# Patient Record
Sex: FEMALE | Race: WHITE | NOT HISPANIC OR LATINO | URBAN - METROPOLITAN AREA
[De-identification: names, ages, dates, MRNs, and addresses within clinical notes are randomized per-mention and may not be internally consistent; named-entity substitution may affect disease eponyms.]

---

## 2017-01-14 ENCOUNTER — EMERGENCY (EMERGENCY)
Facility: HOSPITAL | Age: 32
LOS: 1 days | Discharge: HOME | End: 2017-01-14
Attending: INTERNAL MEDICINE | Admitting: EMERGENCY MEDICINE
Payer: COMMERCIAL

## 2017-01-14 VITALS
WEIGHT: 190.04 LBS | OXYGEN SATURATION: 98 % | RESPIRATION RATE: 16 BRPM | DIASTOLIC BLOOD PRESSURE: 79 MMHG | HEIGHT: 63 IN | HEART RATE: 82 BPM | SYSTOLIC BLOOD PRESSURE: 118 MMHG | TEMPERATURE: 98 F

## 2017-01-14 DIAGNOSIS — S92.425A NONDISPLACED FRACTURE OF DISTAL PHALANX OF LEFT GREAT TOE, INITIAL ENCOUNTER FOR CLOSED FRACTURE: ICD-10-CM

## 2017-01-14 DIAGNOSIS — S99.922A UNSPECIFIED INJURY OF LEFT FOOT, INITIAL ENCOUNTER: ICD-10-CM

## 2017-01-14 DIAGNOSIS — Z90.13 ACQUIRED ABSENCE OF BILATERAL BREASTS AND NIPPLES: Chronic | ICD-10-CM
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
## 2017-01-14 DIAGNOSIS — F41.9 ANXIETY DISORDER, UNSPECIFIED: ICD-10-CM
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
## 2017-01-14 PROCEDURE — 99283 EMERGENCY DEPT VISIT LOW MDM: CPT | Mod: 25

## 2017-01-14 PROCEDURE — 73620 X-RAY EXAM OF FOOT: CPT

## 2017-01-14 PROCEDURE — 99284 EMERGENCY DEPT VISIT MOD MDM: CPT

## 2017-01-14 PROCEDURE — 73620 X-RAY EXAM OF FOOT: CPT | Mod: 26,LT

## 2017-01-14 NOTE — ED PROVIDER NOTE - CONSTITUTIONAL, MLM
normal... Well appearing, well nourished, awake, alert, oriented to person, place, time/situation and in mild  distress.

## 2017-01-14 NOTE — ED PROVIDER NOTE - PHYSICAL EXAMINATION
focused exam right foot, dorsum at 1st MT sts and pain focused exam left  foot, dorsum at 1st MT sts and pain

## 2017-01-14 NOTE — ED PROVIDER NOTE - CARE PLAN
Principal Discharge DX:	Foot injury Principal Discharge DX:	Foot injury  Secondary Diagnosis:	Toe fracture, left

## 2017-01-14 NOTE — ED PROVIDER NOTE - OBJECTIVE STATEMENT
32 y/o w/f trip and fall, she injured her right foot first MT area, c/o pain and swelling 32 y/o w/f trip and fall, she injured her left  foot first MT area, c/o pain and swelling

## 2017-01-18 NOTE — ED ADULT NURSE NOTE - ATTEMPT TO OOB
.1. Have you been to the ER, urgent care clinic since your last visit? Hospitalized since your last visit? No    2. Have you seen or consulted any other health care providers outside of the [facility]\"" since your last visit? Include any pap smears or colon screening.  No no

## 2018-06-12 ENCOUNTER — APPOINTMENT (OUTPATIENT)
Dept: GASTROENTEROLOGY | Facility: CLINIC | Age: 33
End: 2018-06-12
Payer: COMMERCIAL

## 2018-06-12 VITALS
OXYGEN SATURATION: 98 % | HEART RATE: 65 BPM | BODY MASS INDEX: 31.89 KG/M2 | TEMPERATURE: 97.7 F | HEIGHT: 63 IN | DIASTOLIC BLOOD PRESSURE: 84 MMHG | WEIGHT: 180 LBS | SYSTOLIC BLOOD PRESSURE: 118 MMHG

## 2018-06-12 DIAGNOSIS — Z80.3 FAMILY HISTORY OF MALIGNANT NEOPLASM OF BREAST: ICD-10-CM

## 2018-06-12 DIAGNOSIS — Z83.79 FAMILY HISTORY OF OTHER DISEASES OF THE DIGESTIVE SYSTEM: ICD-10-CM

## 2018-06-12 DIAGNOSIS — R10.9 UNSPECIFIED ABDOMINAL PAIN: ICD-10-CM

## 2018-06-12 DIAGNOSIS — Z80.52 FAMILY HISTORY OF MALIGNANT NEOPLASM OF BLADDER: ICD-10-CM

## 2018-06-12 DIAGNOSIS — Z86.59 PERSONAL HISTORY OF OTHER MENTAL AND BEHAVIORAL DISORDERS: ICD-10-CM

## 2018-06-12 PROCEDURE — 99205 OFFICE O/P NEW HI 60 MIN: CPT

## 2018-06-14 PROBLEM — Z80.3 FAMILY HISTORY OF MALIGNANT NEOPLASM OF BREAST: Status: ACTIVE | Noted: 2018-06-12

## 2018-06-14 PROBLEM — Z86.59 HISTORY OF ANXIETY: Status: RESOLVED | Noted: 2018-06-12 | Resolved: 2018-06-14

## 2018-06-14 PROBLEM — Z83.79 FAMILY HISTORY OF CROHN'S DISEASE: Status: ACTIVE | Noted: 2018-06-12

## 2018-06-14 PROBLEM — Z80.52 FAMILY HISTORY OF MALIGNANT NEOPLASM OF URINARY BLADDER: Status: ACTIVE | Noted: 2018-06-12

## 2018-06-14 RX ORDER — CLONAZEPAM 0.5 MG/1
0.5 TABLET ORAL
Refills: 0 | Status: ACTIVE | COMMUNITY

## 2018-06-14 RX ORDER — ATROPA BELLADONNA AND OPIUM 16.2; 3 MG/1; MG/1
16.2-3 SUPPOSITORY RECTAL
Refills: 0 | Status: ACTIVE | COMMUNITY

## 2018-06-14 RX ORDER — BACILLUS COAGULANS/INULIN 1B-250 MG
CAPSULE ORAL
Refills: 0 | Status: ACTIVE | COMMUNITY

## 2018-06-14 RX ORDER — ONDANSETRON HYDROCHLORIDE 4 MG/1
4 TABLET, FILM COATED ORAL
Refills: 0 | Status: ACTIVE | COMMUNITY

## 2018-06-14 RX ORDER — ESCITALOPRAM OXALATE 20 MG/1
20 TABLET, FILM COATED ORAL
Refills: 0 | Status: ACTIVE | COMMUNITY

## 2018-06-15 ENCOUNTER — LABORATORY RESULT (OUTPATIENT)
Age: 33
End: 2018-06-15

## 2018-06-18 LAB
25(OH)D3 SERPL-MCNC: 22.9 NG/ML
ALBUMIN SERPL ELPH-MCNC: 4 G/DL
ALP BLD-CCNC: 49 U/L
ALT SERPL-CCNC: 21 U/L
AMYLASE/CREAT SERPL: 58 U/L
ANION GAP SERPL CALC-SCNC: 13 MMOL/L
AST SERPL-CCNC: 16 U/L
BACTERIA STL CULT: NORMAL
BASOPHILS # BLD AUTO: 0.05 K/UL
BASOPHILS NFR BLD AUTO: 0.8 %
BILIRUB SERPL-MCNC: 0.2 MG/DL
BUN SERPL-MCNC: 11 MG/DL
CALCIUM SERPL-MCNC: 9.1 MG/DL
CHLORIDE SERPL-SCNC: 101 MMOL/L
CO2 SERPL-SCNC: 26 MMOL/L
CREAT SERPL-MCNC: 0.64 MG/DL
CRP SERPL-MCNC: <0.2 MG/DL
EOSINOPHIL # BLD AUTO: 0.08 K/UL
EOSINOPHIL NFR BLD AUTO: 1.3 %
G LAMBLIA AG STL QL: NORMAL
GLIADIN IGA SER QL: <5 UNITS
GLIADIN IGG SER QL: <5 UNITS
GLIADIN PEPTIDE IGA SER-ACNC: NEGATIVE
GLIADIN PEPTIDE IGG SER-ACNC: NEGATIVE
GLUCOSE SERPL-MCNC: 85 MG/DL
HCT VFR BLD CALC: 34.9 %
HGB BLD-MCNC: 11.5 G/DL
IGA SER QL IEP: 198 MG/DL
IMM GRANULOCYTES NFR BLD AUTO: 0.2 %
LPL SERPL-CCNC: 69 U/L
LYMPHOCYTES # BLD AUTO: 1.72 K/UL
LYMPHOCYTES NFR BLD AUTO: 28.4 %
MAN DIFF?: NORMAL
MCHC RBC-ENTMCNC: 29.5 PG
MCHC RBC-ENTMCNC: 33 GM/DL
MCV RBC AUTO: 89.5 FL
MONOCYTES # BLD AUTO: 0.37 K/UL
MONOCYTES NFR BLD AUTO: 6.1 %
NEUTROPHILS # BLD AUTO: 3.82 K/UL
NEUTROPHILS NFR BLD AUTO: 63.2 %
PLATELET # BLD AUTO: 333 K/UL
POTASSIUM SERPL-SCNC: 4.1 MMOL/L
PROT SERPL-MCNC: 6.7 G/DL
RBC # BLD: 3.9 M/UL
RBC # FLD: 13.1 %
SODIUM SERPL-SCNC: 140 MMOL/L
TSH SERPL-ACNC: 0.88 UIU/ML
TTG IGA SER IA-ACNC: <5 UNITS
TTG IGA SER-ACNC: NEGATIVE
TTG IGG SER IA-ACNC: <5 UNITS
TTG IGG SER IA-ACNC: NEGATIVE
WBC # FLD AUTO: 6.05 K/UL
WRIGHT STN STL: NEGATIVE

## 2018-06-19 LAB — DEPRECATED O AND P PREP STL: NORMAL

## 2018-06-20 LAB
ENDOMYSIUM IGA SER QL: NEGATIVE
ENDOMYSIUM IGA TITR SER: NORMAL

## 2018-06-21 LAB — C DIFF TOX B STL QL CT TISS CULT: NORMAL

## 2018-06-22 ENCOUNTER — FORM ENCOUNTER (OUTPATIENT)
Age: 33
End: 2018-06-22

## 2018-06-23 ENCOUNTER — APPOINTMENT (OUTPATIENT)
Dept: CT IMAGING | Facility: CLINIC | Age: 33
End: 2018-06-23
Payer: COMMERCIAL

## 2018-06-23 ENCOUNTER — OUTPATIENT (OUTPATIENT)
Dept: OUTPATIENT SERVICES | Facility: HOSPITAL | Age: 33
LOS: 1 days | End: 2018-06-23
Payer: COMMERCIAL

## 2018-06-23 DIAGNOSIS — R10.9 UNSPECIFIED ABDOMINAL PAIN: ICD-10-CM

## 2018-06-23 DIAGNOSIS — Z90.13 ACQUIRED ABSENCE OF BILATERAL BREASTS AND NIPPLES: Chronic | ICD-10-CM

## 2018-06-23 PROCEDURE — 74177 CT ABD & PELVIS W/CONTRAST: CPT | Mod: 26

## 2018-06-23 PROCEDURE — 74177 CT ABD & PELVIS W/CONTRAST: CPT

## 2018-06-25 ENCOUNTER — TRANSCRIPTION ENCOUNTER (OUTPATIENT)
Age: 33
End: 2018-06-25

## 2018-06-26 ENCOUNTER — APPOINTMENT (OUTPATIENT)
Dept: GASTROENTEROLOGY | Facility: HOSPITAL | Age: 33
End: 2018-06-26

## 2018-06-26 ENCOUNTER — OUTPATIENT (OUTPATIENT)
Dept: OUTPATIENT SERVICES | Facility: HOSPITAL | Age: 33
LOS: 1 days | End: 2018-06-26
Payer: COMMERCIAL

## 2018-06-26 ENCOUNTER — RESULT REVIEW (OUTPATIENT)
Age: 33
End: 2018-06-26

## 2018-06-26 DIAGNOSIS — R10.9 UNSPECIFIED ABDOMINAL PAIN: ICD-10-CM

## 2018-06-26 DIAGNOSIS — K21.9 GASTRO-ESOPHAGEAL REFLUX DISEASE WITHOUT ESOPHAGITIS: ICD-10-CM

## 2018-06-26 DIAGNOSIS — Z90.13 ACQUIRED ABSENCE OF BILATERAL BREASTS AND NIPPLES: Chronic | ICD-10-CM

## 2018-06-26 DIAGNOSIS — R63.4 ABNORMAL WEIGHT LOSS: ICD-10-CM

## 2018-06-26 LAB — HCG UR QL: NEGATIVE — SIGNIFICANT CHANGE UP

## 2018-06-26 PROCEDURE — 43239 EGD BIOPSY SINGLE/MULTIPLE: CPT

## 2018-06-26 PROCEDURE — 81025 URINE PREGNANCY TEST: CPT

## 2018-06-26 RX ORDER — OMEPRAZOLE 40 MG/1
40 CAPSULE, DELAYED RELEASE ORAL
Qty: 30 | Refills: 12 | Status: ACTIVE | COMMUNITY
Start: 2018-06-26 | End: 1900-01-01

## 2018-06-29 ENCOUNTER — OUTPATIENT (OUTPATIENT)
Dept: OUTPATIENT SERVICES | Facility: HOSPITAL | Age: 33
LOS: 1 days | End: 2018-06-29
Payer: COMMERCIAL

## 2018-06-29 ENCOUNTER — APPOINTMENT (OUTPATIENT)
Dept: GASTROENTEROLOGY | Facility: CLINIC | Age: 33
End: 2018-06-29

## 2018-06-29 DIAGNOSIS — T18.2XXA FOREIGN BODY IN STOMACH, INITIAL ENCOUNTER: ICD-10-CM

## 2018-06-29 DIAGNOSIS — Z90.13 ACQUIRED ABSENCE OF BILATERAL BREASTS AND NIPPLES: Chronic | ICD-10-CM

## 2018-06-29 PROCEDURE — 78264 GASTRIC EMPTYING IMG STUDY: CPT

## 2018-06-29 PROCEDURE — 78264 GASTRIC EMPTYING IMG STUDY: CPT | Mod: 26,GC

## 2018-06-29 PROCEDURE — A9541: CPT

## 2018-07-03 ENCOUNTER — EMERGENCY (EMERGENCY)
Facility: HOSPITAL | Age: 33
LOS: 1 days | Discharge: HOME | End: 2018-07-03
Attending: EMERGENCY MEDICINE
Payer: COMMERCIAL

## 2018-07-03 VITALS
WEIGHT: 181 LBS | RESPIRATION RATE: 18 BRPM | DIASTOLIC BLOOD PRESSURE: 73 MMHG | HEIGHT: 63 IN | SYSTOLIC BLOOD PRESSURE: 117 MMHG | TEMPERATURE: 98 F | OXYGEN SATURATION: 99 % | HEART RATE: 95 BPM

## 2018-07-03 VITALS
RESPIRATION RATE: 16 BRPM | HEART RATE: 87 BPM | TEMPERATURE: 98 F | DIASTOLIC BLOOD PRESSURE: 70 MMHG | SYSTOLIC BLOOD PRESSURE: 110 MMHG | OXYGEN SATURATION: 99 %

## 2018-07-03 DIAGNOSIS — Z90.13 ACQUIRED ABSENCE OF BILATERAL BREASTS AND NIPPLES: Chronic | ICD-10-CM

## 2018-07-03 LAB
ALBUMIN SERPL ELPH-MCNC: 3.7 G/DL — SIGNIFICANT CHANGE UP (ref 3.3–5)
ALP SERPL-CCNC: 75 U/L — SIGNIFICANT CHANGE UP (ref 40–120)
ALT FLD-CCNC: 131 U/L — HIGH (ref 12–78)
ANION GAP SERPL CALC-SCNC: 9 MMOL/L — SIGNIFICANT CHANGE UP (ref 5–17)
APPEARANCE UR: ABNORMAL
AST SERPL-CCNC: 247 U/L — HIGH (ref 15–37)
BACTERIA # UR AUTO: ABNORMAL
BASOPHILS # BLD AUTO: 0.06 K/UL — SIGNIFICANT CHANGE UP (ref 0–0.2)
BASOPHILS NFR BLD AUTO: 0.6 % — SIGNIFICANT CHANGE UP (ref 0–2)
BILIRUB SERPL-MCNC: 0.6 MG/DL — SIGNIFICANT CHANGE UP (ref 0.2–1.2)
BILIRUB UR-MCNC: NEGATIVE — SIGNIFICANT CHANGE UP
BUN SERPL-MCNC: 9 MG/DL — SIGNIFICANT CHANGE UP (ref 7–23)
CALCIUM SERPL-MCNC: 8.2 MG/DL — LOW (ref 8.5–10.1)
CHLORIDE SERPL-SCNC: 107 MMOL/L — SIGNIFICANT CHANGE UP (ref 96–108)
CO2 SERPL-SCNC: 25 MMOL/L — SIGNIFICANT CHANGE UP (ref 22–31)
COLOR SPEC: YELLOW — SIGNIFICANT CHANGE UP
CREAT SERPL-MCNC: 0.69 MG/DL — SIGNIFICANT CHANGE UP (ref 0.5–1.3)
DIFF PNL FLD: ABNORMAL
EOSINOPHIL # BLD AUTO: 0.01 K/UL — SIGNIFICANT CHANGE UP (ref 0–0.5)
EOSINOPHIL NFR BLD AUTO: 0.1 % — SIGNIFICANT CHANGE UP (ref 0–6)
EPI CELLS # UR: SIGNIFICANT CHANGE UP
GLUCOSE SERPL-MCNC: 95 MG/DL — SIGNIFICANT CHANGE UP (ref 70–99)
GLUCOSE UR QL: NEGATIVE — SIGNIFICANT CHANGE UP
HCT VFR BLD CALC: 34.3 % — LOW (ref 34.5–45)
HGB BLD-MCNC: 11.5 G/DL — SIGNIFICANT CHANGE UP (ref 11.5–15.5)
IMM GRANULOCYTES NFR BLD AUTO: 0.2 % — SIGNIFICANT CHANGE UP (ref 0–1.5)
KETONES UR-MCNC: NEGATIVE — SIGNIFICANT CHANGE UP
LEUKOCYTE ESTERASE UR-ACNC: ABNORMAL
LIDOCAIN IGE QN: 161 U/L — SIGNIFICANT CHANGE UP (ref 73–393)
LYMPHOCYTES # BLD AUTO: 1.41 K/UL — SIGNIFICANT CHANGE UP (ref 1–3.3)
LYMPHOCYTES # BLD AUTO: 14.8 % — SIGNIFICANT CHANGE UP (ref 13–44)
MCHC RBC-ENTMCNC: 29.4 PG — SIGNIFICANT CHANGE UP (ref 27–34)
MCHC RBC-ENTMCNC: 33.5 GM/DL — SIGNIFICANT CHANGE UP (ref 32–36)
MCV RBC AUTO: 87.7 FL — SIGNIFICANT CHANGE UP (ref 80–100)
MONOCYTES # BLD AUTO: 0.54 K/UL — SIGNIFICANT CHANGE UP (ref 0–0.9)
MONOCYTES NFR BLD AUTO: 5.7 % — SIGNIFICANT CHANGE UP (ref 2–14)
NEUTROPHILS # BLD AUTO: 7.46 K/UL — HIGH (ref 1.8–7.4)
NEUTROPHILS NFR BLD AUTO: 78.6 % — HIGH (ref 43–77)
NITRITE UR-MCNC: NEGATIVE — SIGNIFICANT CHANGE UP
NRBC # BLD: 0 /100 WBCS — SIGNIFICANT CHANGE UP (ref 0–0)
PH UR: 8 — SIGNIFICANT CHANGE UP (ref 5–8)
PLATELET # BLD AUTO: 284 K/UL — SIGNIFICANT CHANGE UP (ref 150–400)
POTASSIUM SERPL-MCNC: 3.7 MMOL/L — SIGNIFICANT CHANGE UP (ref 3.5–5.3)
POTASSIUM SERPL-SCNC: 3.7 MMOL/L — SIGNIFICANT CHANGE UP (ref 3.5–5.3)
PROT SERPL-MCNC: 6.9 G/DL — SIGNIFICANT CHANGE UP (ref 6–8.3)
PROT UR-MCNC: 25 MG/DL
RBC # BLD: 3.91 M/UL — SIGNIFICANT CHANGE UP (ref 3.8–5.2)
RBC # FLD: 12.9 % — SIGNIFICANT CHANGE UP (ref 10.3–14.5)
RBC CASTS # UR COMP ASSIST: ABNORMAL /HPF (ref 0–4)
SODIUM SERPL-SCNC: 141 MMOL/L — SIGNIFICANT CHANGE UP (ref 135–145)
SP GR SPEC: 1.01 — SIGNIFICANT CHANGE UP (ref 1.01–1.02)
UROBILINOGEN FLD QL: NEGATIVE — SIGNIFICANT CHANGE UP
WBC # BLD: 9.5 K/UL — SIGNIFICANT CHANGE UP (ref 3.8–10.5)
WBC # FLD AUTO: 9.5 K/UL — SIGNIFICANT CHANGE UP (ref 3.8–10.5)
WBC UR QL: SIGNIFICANT CHANGE UP

## 2018-07-03 PROCEDURE — 99284 EMERGENCY DEPT VISIT MOD MDM: CPT | Mod: 25

## 2018-07-03 PROCEDURE — 81001 URINALYSIS AUTO W/SCOPE: CPT

## 2018-07-03 PROCEDURE — 83690 ASSAY OF LIPASE: CPT

## 2018-07-03 PROCEDURE — 85027 COMPLETE CBC AUTOMATED: CPT

## 2018-07-03 PROCEDURE — 76705 ECHO EXAM OF ABDOMEN: CPT

## 2018-07-03 PROCEDURE — 76705 ECHO EXAM OF ABDOMEN: CPT | Mod: 26

## 2018-07-03 PROCEDURE — 99285 EMERGENCY DEPT VISIT HI MDM: CPT

## 2018-07-03 PROCEDURE — 80053 COMPREHEN METABOLIC PANEL: CPT

## 2018-07-03 PROCEDURE — 96375 TX/PRO/DX INJ NEW DRUG ADDON: CPT

## 2018-07-03 PROCEDURE — 96374 THER/PROPH/DIAG INJ IV PUSH: CPT

## 2018-07-03 RX ORDER — ONDANSETRON 8 MG/1
4 TABLET, FILM COATED ORAL ONCE
Qty: 0 | Refills: 0 | Status: COMPLETED | OUTPATIENT
Start: 2018-07-03 | End: 2018-07-03

## 2018-07-03 RX ORDER — FAMOTIDINE 10 MG/ML
20 INJECTION INTRAVENOUS ONCE
Qty: 0 | Refills: 0 | Status: COMPLETED | OUTPATIENT
Start: 2018-07-03 | End: 2018-07-03

## 2018-07-03 RX ORDER — SUCRALFATE 1 G
10 TABLET ORAL
Qty: 100 | Refills: 0 | OUTPATIENT
Start: 2018-07-03 | End: 2018-07-12

## 2018-07-03 RX ORDER — SODIUM CHLORIDE 9 MG/ML
3 INJECTION INTRAMUSCULAR; INTRAVENOUS; SUBCUTANEOUS ONCE
Qty: 0 | Refills: 0 | Status: COMPLETED | OUTPATIENT
Start: 2018-07-03 | End: 2018-07-03

## 2018-07-03 RX ORDER — SODIUM CHLORIDE 9 MG/ML
1000 INJECTION INTRAMUSCULAR; INTRAVENOUS; SUBCUTANEOUS ONCE
Qty: 0 | Refills: 0 | Status: COMPLETED | OUTPATIENT
Start: 2018-07-03 | End: 2018-07-03

## 2018-07-03 RX ORDER — METOCLOPRAMIDE HCL 10 MG
10 TABLET ORAL ONCE
Qty: 0 | Refills: 0 | Status: COMPLETED | OUTPATIENT
Start: 2018-07-03 | End: 2018-07-03

## 2018-07-03 RX ORDER — FAMOTIDINE 10 MG/ML
20 INJECTION INTRAVENOUS ONCE
Qty: 0 | Refills: 0 | Status: DISCONTINUED | OUTPATIENT
Start: 2018-07-03 | End: 2018-07-03

## 2018-07-03 RX ORDER — SUCRALFATE 1 G
1 TABLET ORAL ONCE
Qty: 0 | Refills: 0 | Status: COMPLETED | OUTPATIENT
Start: 2018-07-03 | End: 2018-07-03

## 2018-07-03 RX ADMIN — FAMOTIDINE 104 MILLIGRAM(S): 10 INJECTION INTRAVENOUS at 02:32

## 2018-07-03 RX ADMIN — Medication 1 GRAM(S): at 04:30

## 2018-07-03 RX ADMIN — SODIUM CHLORIDE 1000 MILLILITER(S): 9 INJECTION INTRAMUSCULAR; INTRAVENOUS; SUBCUTANEOUS at 02:32

## 2018-07-03 RX ADMIN — Medication 10 MILLIGRAM(S): at 04:29

## 2018-07-03 RX ADMIN — SODIUM CHLORIDE 3 MILLILITER(S): 9 INJECTION INTRAMUSCULAR; INTRAVENOUS; SUBCUTANEOUS at 02:00

## 2018-07-03 RX ADMIN — ONDANSETRON 4 MILLIGRAM(S): 8 TABLET, FILM COATED ORAL at 02:32

## 2018-07-03 NOTE — ED PROVIDER NOTE - PROGRESS NOTE DETAILS
pt reevaluated, feeling much better, cramping has resolved and nausea has subsided, pt to be d/c home with carafate, follow up with her GI doc, and return if any symptoms worsen

## 2018-07-03 NOTE — ED ADULT NURSE NOTE - OBJECTIVE STATEMENT
received pt stable c/o abd pain pt evaluated and blood work drawned and sent to lab and medicated as ordered

## 2018-07-03 NOTE — ED PROVIDER NOTE - OBJECTIVE STATEMENT
33yo female who presents with abd pain vomiting and diarrhea today. pt has hx of ibs and started with diarrhea today, then vomiting, with diffuse cramping, pt tried her bentyl, pepcid and zofran with no relief. pt was just seen by her GI doc, had ct, endoscopy, colonoscopy

## 2018-07-03 NOTE — ED ADULT TRIAGE NOTE - CHIEF COMPLAINT QUOTE
complains of upper abdominal pain, nausea/vomiting/diarrhea. complains of chills without fever. history of IBS

## 2018-07-11 ENCOUNTER — EMERGENCY (EMERGENCY)
Facility: HOSPITAL | Age: 33
LOS: 1 days | Discharge: HOME | End: 2018-07-11
Attending: EMERGENCY MEDICINE | Admitting: EMERGENCY MEDICINE
Payer: COMMERCIAL

## 2018-07-11 VITALS
HEART RATE: 71 BPM | TEMPERATURE: 98 F | DIASTOLIC BLOOD PRESSURE: 84 MMHG | SYSTOLIC BLOOD PRESSURE: 130 MMHG | OXYGEN SATURATION: 97 % | RESPIRATION RATE: 16 BRPM

## 2018-07-11 VITALS
WEIGHT: 179.9 LBS | RESPIRATION RATE: 15 BRPM | HEART RATE: 73 BPM | OXYGEN SATURATION: 99 % | TEMPERATURE: 98 F | DIASTOLIC BLOOD PRESSURE: 69 MMHG | SYSTOLIC BLOOD PRESSURE: 102 MMHG | HEIGHT: 63 IN

## 2018-07-11 DIAGNOSIS — Z90.49 ACQUIRED ABSENCE OF OTHER SPECIFIED PARTS OF DIGESTIVE TRACT: Chronic | ICD-10-CM

## 2018-07-11 DIAGNOSIS — Z90.13 ACQUIRED ABSENCE OF BILATERAL BREASTS AND NIPPLES: Chronic | ICD-10-CM

## 2018-07-11 LAB
APPEARANCE UR: CLEAR — SIGNIFICANT CHANGE UP
BASOPHILS # BLD AUTO: 0.06 K/UL — SIGNIFICANT CHANGE UP (ref 0–0.2)
BASOPHILS NFR BLD AUTO: 1.2 % — SIGNIFICANT CHANGE UP (ref 0–2)
BILIRUB UR-MCNC: NEGATIVE — SIGNIFICANT CHANGE UP
COLOR SPEC: SIGNIFICANT CHANGE UP
DIFF PNL FLD: NEGATIVE — SIGNIFICANT CHANGE UP
EOSINOPHIL # BLD AUTO: 0.02 K/UL — SIGNIFICANT CHANGE UP (ref 0–0.5)
EOSINOPHIL NFR BLD AUTO: 0.4 % — SIGNIFICANT CHANGE UP (ref 0–6)
GLUCOSE UR QL: NEGATIVE — SIGNIFICANT CHANGE UP
HCT VFR BLD CALC: 36.5 % — SIGNIFICANT CHANGE UP (ref 34.5–45)
HGB BLD-MCNC: 12 G/DL — SIGNIFICANT CHANGE UP (ref 11.5–15.5)
IMM GRANULOCYTES NFR BLD AUTO: 0.4 % — SIGNIFICANT CHANGE UP (ref 0–1.5)
KETONES UR-MCNC: NEGATIVE — SIGNIFICANT CHANGE UP
LEUKOCYTE ESTERASE UR-ACNC: NEGATIVE — SIGNIFICANT CHANGE UP
LIDOCAIN IGE QN: 174 U/L — SIGNIFICANT CHANGE UP (ref 73–393)
LYMPHOCYTES # BLD AUTO: 0.92 K/UL — LOW (ref 1–3.3)
LYMPHOCYTES # BLD AUTO: 18.9 % — SIGNIFICANT CHANGE UP (ref 13–44)
MCHC RBC-ENTMCNC: 29.1 PG — SIGNIFICANT CHANGE UP (ref 27–34)
MCHC RBC-ENTMCNC: 32.9 GM/DL — SIGNIFICANT CHANGE UP (ref 32–36)
MCV RBC AUTO: 88.4 FL — SIGNIFICANT CHANGE UP (ref 80–100)
MONOCYTES # BLD AUTO: 0.46 K/UL — SIGNIFICANT CHANGE UP (ref 0–0.9)
MONOCYTES NFR BLD AUTO: 9.4 % — SIGNIFICANT CHANGE UP (ref 2–14)
NEUTROPHILS # BLD AUTO: 3.39 K/UL — SIGNIFICANT CHANGE UP (ref 1.8–7.4)
NEUTROPHILS NFR BLD AUTO: 69.7 % — SIGNIFICANT CHANGE UP (ref 43–77)
NITRITE UR-MCNC: NEGATIVE — SIGNIFICANT CHANGE UP
NRBC # BLD: 0 /100 WBCS — SIGNIFICANT CHANGE UP (ref 0–0)
PH UR: 8 — SIGNIFICANT CHANGE UP (ref 5–8)
PLATELET # BLD AUTO: 259 K/UL — SIGNIFICANT CHANGE UP (ref 150–400)
PROT UR-MCNC: NEGATIVE — SIGNIFICANT CHANGE UP
RBC # BLD: 4.13 M/UL — SIGNIFICANT CHANGE UP (ref 3.8–5.2)
RBC # FLD: 12.7 % — SIGNIFICANT CHANGE UP (ref 10.3–14.5)
SP GR SPEC: 1.01 — SIGNIFICANT CHANGE UP (ref 1.01–1.02)
UROBILINOGEN FLD QL: NEGATIVE — SIGNIFICANT CHANGE UP
WBC # BLD: 4.87 K/UL — SIGNIFICANT CHANGE UP (ref 3.8–10.5)
WBC # FLD AUTO: 4.87 K/UL — SIGNIFICANT CHANGE UP (ref 3.8–10.5)

## 2018-07-11 PROCEDURE — 80053 COMPREHEN METABOLIC PANEL: CPT

## 2018-07-11 PROCEDURE — 85027 COMPLETE CBC AUTOMATED: CPT

## 2018-07-11 PROCEDURE — 96374 THER/PROPH/DIAG INJ IV PUSH: CPT

## 2018-07-11 PROCEDURE — 99284 EMERGENCY DEPT VISIT MOD MDM: CPT | Mod: 25

## 2018-07-11 PROCEDURE — 83690 ASSAY OF LIPASE: CPT

## 2018-07-11 PROCEDURE — 99284 EMERGENCY DEPT VISIT MOD MDM: CPT

## 2018-07-11 PROCEDURE — 81003 URINALYSIS AUTO W/O SCOPE: CPT

## 2018-07-11 PROCEDURE — 36415 COLL VENOUS BLD VENIPUNCTURE: CPT

## 2018-07-11 PROCEDURE — 96375 TX/PRO/DX INJ NEW DRUG ADDON: CPT

## 2018-07-11 RX ORDER — ONDANSETRON 8 MG/1
4 TABLET, FILM COATED ORAL ONCE
Qty: 0 | Refills: 0 | Status: COMPLETED | OUTPATIENT
Start: 2018-07-11 | End: 2018-07-11

## 2018-07-11 RX ORDER — METOCLOPRAMIDE HCL 10 MG
10 TABLET ORAL ONCE
Qty: 0 | Refills: 0 | Status: COMPLETED | OUTPATIENT
Start: 2018-07-11 | End: 2018-07-11

## 2018-07-11 RX ORDER — PANTOPRAZOLE SODIUM 20 MG/1
40 TABLET, DELAYED RELEASE ORAL ONCE
Qty: 0 | Refills: 0 | Status: COMPLETED | OUTPATIENT
Start: 2018-07-11 | End: 2018-07-11

## 2018-07-11 RX ORDER — SODIUM CHLORIDE 9 MG/ML
1000 INJECTION INTRAMUSCULAR; INTRAVENOUS; SUBCUTANEOUS ONCE
Qty: 0 | Refills: 0 | Status: COMPLETED | OUTPATIENT
Start: 2018-07-11 | End: 2018-07-11

## 2018-07-11 RX ADMIN — Medication 10 MILLIGRAM(S): at 23:15

## 2018-07-11 RX ADMIN — SODIUM CHLORIDE 1000 MILLILITER(S): 9 INJECTION INTRAMUSCULAR; INTRAVENOUS; SUBCUTANEOUS at 21:55

## 2018-07-11 RX ADMIN — ONDANSETRON 4 MILLIGRAM(S): 8 TABLET, FILM COATED ORAL at 21:56

## 2018-07-11 RX ADMIN — PANTOPRAZOLE SODIUM 40 MILLIGRAM(S): 20 TABLET, DELAYED RELEASE ORAL at 21:57

## 2018-07-11 NOTE — ED ADULT NURSE NOTE - OBJECTIVE STATEMENT
Pt states she has had frequent stomach issues including N/V/D for close to a year. Pt had a cholecystectomy a year ago and has been followed by GI for management. Pt has been seen in the ED 1 week ago and was prescribed GI meds, anti-spasmodic meds and anti-nausea medication. Pt now has the same symptoms tonight.

## 2018-07-11 NOTE — ED PROVIDER NOTE - OBJECTIVE STATEMENT
33 y/o F pt with history of cholecystectomy, anxiety- on Lexapro and Klonopin, nephrolithiasis, and GERD presents to the ED c/o nausea and abdominal pain x1 year, worsening. Pt was having constant diarrhea yesterday and c/o experiencing RUQ spasms. Pt was at the ED in [place] 8 days ago for similar sx. Pt reports having diarrhea since cholecystectomy. Pt has been tested for Crohn's and has recently had a CT, endoscopy and blood-work. Pt was given Carafate. Pt has GI doctor, but recently changed to a new one. Pt took Zofran a couple hours ago. Pt confirms elevated past LFTs. Pt also takes Pepcid and dicyclomine. Denies urinary dysfunction, fever, and vomiting. No further complaints at this time.   PMD: Dr. Eaton 33 y/o F pt with history of cholecystectomy, anxiety- on Lexapro and Klonopin, nephrolithiasis, and GERD presents to the ED c/o nausea and abdominal pain x1 year, worsening. Pt was having constant diarrhea yesterday and c/o experiencing RUQ spasms. Pt was at the ED in [place] 8 days ago for similar sx. Pt reports having diarrhea since cholecystectomy. Pt has been tested for Crohn's and has recently had a CT, endoscopy and blood-work. Pt was given Carafate. Pt has GI doctor, but recently changed to a new one. Pt took Zofran a couple hours ago. Pt confirms elevated past LFTs. Pt also takes Pepcid and dicyclomine. Denies urinary dysfunction, fever, and vomiting. No further complaints at this time.   GI: Dr. Abdalla

## 2018-07-11 NOTE — ED PROVIDER NOTE - PSH
History of bilateral breast reduction surgery    S/P cholecystectomy    S/P rhinoplasty    S/P sinus surgery

## 2018-07-11 NOTE — ED PROVIDER NOTE - NS_ ATTENDINGSCRIBEDETAILS _ED_A_ED_FT
Ricky Araujo MD - The scribe's documentation has been prepared under my direction and personally reviewed by me in its entirety. I confirm that the note above accurately reflects all work, treatment, procedures, and medical decision making performed by me.

## 2018-07-11 NOTE — ED ADULT NURSE NOTE - CHIEF COMPLAINT QUOTE
Abdominal pain for years, was in [place] ER for same last week. Appointment gastro this Friday. Nausea since 1 pm, no vomiting.

## 2018-07-11 NOTE — ED PROVIDER NOTE - ENMT, MLM
Airway patent, +DRY MUCOUS MEMBRANES. Throat has no vesicles, no oropharyngeal exudates and uvula is midline.

## 2018-07-11 NOTE — ED ADULT TRIAGE NOTE - CHIEF COMPLAINT QUOTE
Abdominal pain for years, was in [place] ER for same last week. Appointment gastro this Friday. Nausea since 1 pm, no vomiting.

## 2018-07-11 NOTE — ED ADULT NURSE REASSESSMENT NOTE - NS ED NURSE REASSESS COMMENT FT1
Pt reports feeling improvement following medication administration. Pt is c/o of mild nausea at this time, no further abdominal pain.

## 2018-07-11 NOTE — ED PROVIDER NOTE - MEDICAL DECISION MAKING DETAILS
33 y/o F pt with abdominal pain and diarrhea. Will rehydrate, receive anti-nausea medication, and start PPI. Pt can follow up with GI as an outpatient.

## 2018-07-13 ENCOUNTER — FORM ENCOUNTER (OUTPATIENT)
Age: 33
End: 2018-07-13

## 2018-07-13 ENCOUNTER — APPOINTMENT (OUTPATIENT)
Dept: GASTROENTEROLOGY | Facility: CLINIC | Age: 33
End: 2018-07-13
Payer: COMMERCIAL

## 2018-07-13 VITALS
SYSTOLIC BLOOD PRESSURE: 108 MMHG | HEIGHT: 63 IN | DIASTOLIC BLOOD PRESSURE: 76 MMHG | BODY MASS INDEX: 31.54 KG/M2 | WEIGHT: 178 LBS | HEART RATE: 68 BPM | OXYGEN SATURATION: 97 %

## 2018-07-13 DIAGNOSIS — K58.9 IRRITABLE BOWEL SYNDROME W/OUT DIARRHEA: ICD-10-CM

## 2018-07-13 DIAGNOSIS — R63.4 ABNORMAL WEIGHT LOSS: ICD-10-CM

## 2018-07-13 DIAGNOSIS — R74.8 ABNORMAL LEVELS OF OTHER SERUM ENZYMES: ICD-10-CM

## 2018-07-13 DIAGNOSIS — T18.2XXA FOREIGN BODY IN STOMACH, INITIAL ENCOUNTER: ICD-10-CM

## 2018-07-13 PROCEDURE — 99215 OFFICE O/P EST HI 40 MIN: CPT

## 2018-07-14 ENCOUNTER — OUTPATIENT (OUTPATIENT)
Dept: OUTPATIENT SERVICES | Facility: HOSPITAL | Age: 33
LOS: 1 days | End: 2018-07-14
Payer: COMMERCIAL

## 2018-07-14 ENCOUNTER — APPOINTMENT (OUTPATIENT)
Dept: ULTRASOUND IMAGING | Facility: CLINIC | Age: 33
End: 2018-07-14

## 2018-07-14 DIAGNOSIS — Z90.13 ACQUIRED ABSENCE OF BILATERAL BREASTS AND NIPPLES: Chronic | ICD-10-CM

## 2018-07-14 DIAGNOSIS — R74.8 ABNORMAL LEVELS OF OTHER SERUM ENZYMES: ICD-10-CM

## 2018-07-14 DIAGNOSIS — Z90.49 ACQUIRED ABSENCE OF OTHER SPECIFIED PARTS OF DIGESTIVE TRACT: Chronic | ICD-10-CM

## 2018-07-14 PROCEDURE — 76700 US EXAM ABDOM COMPLETE: CPT

## 2018-07-14 PROCEDURE — 76700 US EXAM ABDOM COMPLETE: CPT | Mod: 26

## 2018-07-17 LAB
ALBUMIN SERPL ELPH-MCNC: 4.4 G/DL
ALP BLD-CCNC: 84 U/L
ALT SERPL-CCNC: 145 U/L
AST SERPL-CCNC: 42 U/L
BILIRUB DIRECT SERPL-MCNC: 0.1 MG/DL
BILIRUB INDIRECT SERPL-MCNC: 0.2 MG/DL
BILIRUB SERPL-MCNC: 0.3 MG/DL
CERULOPLASMIN SERPL-MCNC: 32 MG/DL
EBV EA AB SER IA-ACNC: <5 U/ML
EBV EA AB TITR SER IF: NEGATIVE
EBV EA IGG SER QL IA: <3 U/ML
EBV EA IGG SER-ACNC: NEGATIVE
EBV EA IGM SER IA-ACNC: NEGATIVE
EBV PATRN SPEC IB-IMP: NORMAL
EBV VCA IGG SER IA-ACNC: <10 U/ML
EBV VCA IGM SER QL IA: <10 U/ML
EPSTEIN-BARR VIRUS CAPSID ANTIGEN IGG: NEGATIVE
HAV IGM SER QL: NONREACTIVE
HBV SURFACE AB SER QL: REACTIVE
HBV SURFACE AG SER QL: NONREACTIVE
HCV AB SER QL: NONREACTIVE
HCV S/CO RATIO: 0.17 S/CO
HEPATITIS A IGG ANTIBODY: REACTIVE
LKM AB SER QL IF: 1.2 UNITS
MITOCHONDRIA AB SER IF-ACNC: NORMAL
PROT SERPL-MCNC: 7.1 G/DL
SMOOTH MUSCLE AB SER QL IF: ABNORMAL

## 2018-07-18 ENCOUNTER — APPOINTMENT (OUTPATIENT)
Dept: GASTROENTEROLOGY | Facility: CLINIC | Age: 33
End: 2018-07-18

## 2018-07-18 LAB
ANA SER IF-ACNC: NEGATIVE
IGG SUBSET TOTAL IGG: 819 MG/DL
IGG1 SER-MCNC: 567 MG/DL
IGG2 SER-MCNC: 214 MG/DL
IGG3 SER-MCNC: 21 MG/DL
IGG4 SER-MCNC: 2 MG/DL

## 2018-07-19 DIAGNOSIS — K21.9 GASTRO-ESOPHAGEAL REFLUX DISEASE W/OUT ESOPHAGITIS: ICD-10-CM

## 2018-07-19 DIAGNOSIS — K52.9 NONINFECTIVE GASTROENTERITIS AND COLITIS, UNSPECIFIED: ICD-10-CM

## 2018-07-23 LAB — SOLUBLE LIVER IGG SER IA-ACNC: < 20.1 UNITS

## 2018-07-24 ENCOUNTER — INPATIENT (INPATIENT)
Facility: HOSPITAL | Age: 33
LOS: 2 days | Discharge: HOME | DRG: 392 | End: 2018-07-27
Attending: FAMILY MEDICINE | Admitting: HOSPITALIST
Payer: COMMERCIAL

## 2018-07-24 VITALS
WEIGHT: 177.03 LBS | RESPIRATION RATE: 16 BRPM | HEIGHT: 63 IN | OXYGEN SATURATION: 99 % | TEMPERATURE: 98 F | HEART RATE: 88 BPM | DIASTOLIC BLOOD PRESSURE: 68 MMHG | SYSTOLIC BLOOD PRESSURE: 97 MMHG

## 2018-07-24 DIAGNOSIS — Z90.49 ACQUIRED ABSENCE OF OTHER SPECIFIED PARTS OF DIGESTIVE TRACT: Chronic | ICD-10-CM

## 2018-07-24 DIAGNOSIS — Z90.13 ACQUIRED ABSENCE OF BILATERAL BREASTS AND NIPPLES: Chronic | ICD-10-CM

## 2018-07-24 LAB
ALBUMIN SERPL ELPH-MCNC: 4.1 G/DL — SIGNIFICANT CHANGE UP (ref 3.3–5)
ALP SERPL-CCNC: 70 U/L — SIGNIFICANT CHANGE UP (ref 40–120)
ALT FLD-CCNC: 57 U/L — SIGNIFICANT CHANGE UP (ref 12–78)
AMYLASE P1 CFR SERPL: 38 U/L — SIGNIFICANT CHANGE UP (ref 25–115)
ANION GAP SERPL CALC-SCNC: 9 MMOL/L — SIGNIFICANT CHANGE UP (ref 5–17)
AST SERPL-CCNC: 92 U/L — HIGH (ref 15–37)
BILIRUB SERPL-MCNC: 0.4 MG/DL — SIGNIFICANT CHANGE UP (ref 0.2–1.2)
BUN SERPL-MCNC: 11 MG/DL — SIGNIFICANT CHANGE UP (ref 7–23)
CALCIUM SERPL-MCNC: 8.8 MG/DL — SIGNIFICANT CHANGE UP (ref 8.5–10.1)
CHLORIDE SERPL-SCNC: 108 MMOL/L — SIGNIFICANT CHANGE UP (ref 96–108)
CO2 SERPL-SCNC: 23 MMOL/L — SIGNIFICANT CHANGE UP (ref 22–31)
CREAT SERPL-MCNC: 0.57 MG/DL — SIGNIFICANT CHANGE UP (ref 0.5–1.3)
GLUCOSE SERPL-MCNC: 87 MG/DL — SIGNIFICANT CHANGE UP (ref 70–99)
HCG SERPL-ACNC: <1 MIU/ML — SIGNIFICANT CHANGE UP
LIDOCAIN IGE QN: 138 U/L — SIGNIFICANT CHANGE UP (ref 73–393)
POTASSIUM SERPL-MCNC: 3.5 MMOL/L — SIGNIFICANT CHANGE UP (ref 3.5–5.3)
POTASSIUM SERPL-SCNC: 3.5 MMOL/L — SIGNIFICANT CHANGE UP (ref 3.5–5.3)
PROT SERPL-MCNC: 7.4 G/DL — SIGNIFICANT CHANGE UP (ref 6–8.3)
SODIUM SERPL-SCNC: 140 MMOL/L — SIGNIFICANT CHANGE UP (ref 135–145)

## 2018-07-24 RX ORDER — HYDROMORPHONE HYDROCHLORIDE 2 MG/ML
0.5 INJECTION INTRAMUSCULAR; INTRAVENOUS; SUBCUTANEOUS ONCE
Qty: 0 | Refills: 0 | Status: DISCONTINUED | OUTPATIENT
Start: 2018-07-24 | End: 2018-07-24

## 2018-07-24 RX ORDER — FAMOTIDINE 10 MG/ML
20 INJECTION INTRAVENOUS ONCE
Qty: 0 | Refills: 0 | Status: COMPLETED | OUTPATIENT
Start: 2018-07-24 | End: 2018-07-24

## 2018-07-24 RX ORDER — ONDANSETRON 8 MG/1
4 TABLET, FILM COATED ORAL ONCE
Qty: 0 | Refills: 0 | Status: DISCONTINUED | OUTPATIENT
Start: 2018-07-24 | End: 2018-07-24

## 2018-07-24 RX ORDER — SODIUM CHLORIDE 9 MG/ML
1000 INJECTION INTRAMUSCULAR; INTRAVENOUS; SUBCUTANEOUS ONCE
Qty: 0 | Refills: 0 | Status: COMPLETED | OUTPATIENT
Start: 2018-07-24 | End: 2018-07-24

## 2018-07-24 RX ORDER — SODIUM CHLORIDE 9 MG/ML
1000 INJECTION INTRAMUSCULAR; INTRAVENOUS; SUBCUTANEOUS ONCE
Qty: 0 | Refills: 0 | Status: DISCONTINUED | OUTPATIENT
Start: 2018-07-24 | End: 2018-07-24

## 2018-07-24 RX ADMIN — SODIUM CHLORIDE 1000 MILLILITER(S): 9 INJECTION INTRAMUSCULAR; INTRAVENOUS; SUBCUTANEOUS at 23:21

## 2018-07-24 RX ADMIN — HYDROMORPHONE HYDROCHLORIDE 0.5 MILLIGRAM(S): 2 INJECTION INTRAMUSCULAR; INTRAVENOUS; SUBCUTANEOUS at 23:30

## 2018-07-24 RX ADMIN — FAMOTIDINE 104 MILLIGRAM(S): 10 INJECTION INTRAVENOUS at 23:31

## 2018-07-24 NOTE — ED PROVIDER NOTE - ATTENDING CONTRIBUTION TO CARE
Pt seen and examined and d/w PA.  agree with a and p.  pt is a 31 yo female hx ibs, michelle. pt with [facility] with similar abd pain, epigastric with vomiting. labs wnl but ct with gastric thickening, abd ttp,no rebecca or guarding, mm dry.  will hydrate, admit, gi in am, antiemetics

## 2018-07-24 NOTE — ED ADULT TRIAGE NOTE - CHIEF COMPLAINT QUOTE
Pt reports upper abd pain and it radiates to the RUQ more often, now has diarrhea, nausea, states it all began today but has been persistent for over 4 weeks

## 2018-07-24 NOTE — ED PROVIDER NOTE - MEDICAL DECISION MAKING DETAILS
abdominal pain, diarrhea, will obtain, labs, give medications, obtain ct abd and pelvis , us abdominal pain, diarrhea, will obtain, labs, give medications, obtain ct abd and pelvis , us--ct with gastric thickening, intractible vomiting and pain, admit, gi consult in am

## 2018-07-24 NOTE — ED PROVIDER NOTE - CARE PLAN
Principal Discharge DX:	Abdominal pain Principal Discharge DX:	Abdominal pain  Secondary Diagnosis:	Vomiting, persistent, in adult  Secondary Diagnosis:	Gastritis

## 2018-07-24 NOTE — ED PROVIDER NOTE - OBJECTIVE STATEMENT
32 y female presents with epigastric pain radiating to ruq, and several episodes of diarrhea,  states she has been having this pain for 3 weeks or more, today it worsened, states she was seen in the ED a few weeks ago,  had us and labs, results were negative, states she took belladonna, dicyclomine, klonopin, zofran and carafate,  states she has of anxiety.  GI Dr Clark.  states she has hx of elevated liver enzymes, recently tested + for toxoplasmosis,  and an auto immune liver disease.  not sure what the name is.  PMD Dr Salvador  hx of cholecystectomy 2012, hx anxiety , IBS

## 2018-07-25 ENCOUNTER — TRANSCRIPTION ENCOUNTER (OUTPATIENT)
Age: 33
End: 2018-07-25

## 2018-07-25 DIAGNOSIS — R19.7 DIARRHEA, UNSPECIFIED: ICD-10-CM

## 2018-07-25 DIAGNOSIS — K58.9 IRRITABLE BOWEL SYNDROME WITHOUT DIARRHEA: ICD-10-CM

## 2018-07-25 DIAGNOSIS — R10.9 UNSPECIFIED ABDOMINAL PAIN: ICD-10-CM

## 2018-07-25 DIAGNOSIS — R74.0 NONSPECIFIC ELEVATION OF LEVELS OF TRANSAMINASE AND LACTIC ACID DEHYDROGENASE [LDH]: ICD-10-CM

## 2018-07-25 DIAGNOSIS — F41.9 ANXIETY DISORDER, UNSPECIFIED: ICD-10-CM

## 2018-07-25 DIAGNOSIS — Z29.9 ENCOUNTER FOR PROPHYLACTIC MEASURES, UNSPECIFIED: ICD-10-CM

## 2018-07-25 DIAGNOSIS — R10.10 UPPER ABDOMINAL PAIN, UNSPECIFIED: ICD-10-CM

## 2018-07-25 DIAGNOSIS — K21.9 GASTRO-ESOPHAGEAL REFLUX DISEASE WITHOUT ESOPHAGITIS: ICD-10-CM

## 2018-07-25 LAB
ALBUMIN SERPL ELPH-MCNC: 3.5 G/DL — SIGNIFICANT CHANGE UP (ref 3.3–5)
ALP SERPL-CCNC: 74 U/L — SIGNIFICANT CHANGE UP (ref 40–120)
ALT FLD-CCNC: 173 U/L — HIGH (ref 12–78)
ANION GAP SERPL CALC-SCNC: 7 MMOL/L — SIGNIFICANT CHANGE UP (ref 5–17)
APPEARANCE UR: CLEAR — SIGNIFICANT CHANGE UP
AST SERPL-CCNC: 292 U/L — HIGH (ref 15–37)
BASOPHILS # BLD AUTO: 0.06 K/UL — SIGNIFICANT CHANGE UP (ref 0–0.2)
BASOPHILS # BLD AUTO: 0.09 K/UL — SIGNIFICANT CHANGE UP (ref 0–0.2)
BASOPHILS NFR BLD AUTO: 0.9 % — SIGNIFICANT CHANGE UP (ref 0–2)
BASOPHILS NFR BLD AUTO: 0.9 % — SIGNIFICANT CHANGE UP (ref 0–2)
BILIRUB SERPL-MCNC: 0.4 MG/DL — SIGNIFICANT CHANGE UP (ref 0.2–1.2)
BILIRUB UR-MCNC: NEGATIVE — SIGNIFICANT CHANGE UP
BUN SERPL-MCNC: 6 MG/DL — LOW (ref 7–23)
CALCIUM SERPL-MCNC: 7.9 MG/DL — LOW (ref 8.5–10.1)
CHLORIDE SERPL-SCNC: 109 MMOL/L — HIGH (ref 96–108)
CO2 SERPL-SCNC: 26 MMOL/L — SIGNIFICANT CHANGE UP (ref 22–31)
COLOR SPEC: SIGNIFICANT CHANGE UP
CREAT SERPL-MCNC: 0.64 MG/DL — SIGNIFICANT CHANGE UP (ref 0.5–1.3)
DIFF PNL FLD: NEGATIVE — SIGNIFICANT CHANGE UP
EOSINOPHIL # BLD AUTO: 0.01 K/UL — SIGNIFICANT CHANGE UP (ref 0–0.5)
EOSINOPHIL # BLD AUTO: 0.04 K/UL — SIGNIFICANT CHANGE UP (ref 0–0.5)
EOSINOPHIL NFR BLD AUTO: 0.2 % — SIGNIFICANT CHANGE UP (ref 0–6)
EOSINOPHIL NFR BLD AUTO: 0.4 % — SIGNIFICANT CHANGE UP (ref 0–6)
GLUCOSE SERPL-MCNC: 104 MG/DL — HIGH (ref 70–99)
GLUCOSE UR QL: NEGATIVE — SIGNIFICANT CHANGE UP
HCT VFR BLD CALC: 31.4 % — LOW (ref 34.5–45)
HCT VFR BLD CALC: 35.1 % — SIGNIFICANT CHANGE UP (ref 34.5–45)
HGB BLD-MCNC: 10.5 G/DL — LOW (ref 11.5–15.5)
HGB BLD-MCNC: 11.9 G/DL — SIGNIFICANT CHANGE UP (ref 11.5–15.5)
IMM GRANULOCYTES NFR BLD AUTO: 0.2 % — SIGNIFICANT CHANGE UP (ref 0–1.5)
IMM GRANULOCYTES NFR BLD AUTO: 0.3 % — SIGNIFICANT CHANGE UP (ref 0–1.5)
KETONES UR-MCNC: NEGATIVE — SIGNIFICANT CHANGE UP
LEUKOCYTE ESTERASE UR-ACNC: NEGATIVE — SIGNIFICANT CHANGE UP
LYMPHOCYTES # BLD AUTO: 1 K/UL — SIGNIFICANT CHANGE UP (ref 1–3.3)
LYMPHOCYTES # BLD AUTO: 1.17 K/UL — SIGNIFICANT CHANGE UP (ref 1–3.3)
LYMPHOCYTES # BLD AUTO: 11.8 % — LOW (ref 13–44)
LYMPHOCYTES # BLD AUTO: 15.8 % — SIGNIFICANT CHANGE UP (ref 13–44)
MCHC RBC-ENTMCNC: 29.3 PG — SIGNIFICANT CHANGE UP (ref 27–34)
MCHC RBC-ENTMCNC: 30 PG — SIGNIFICANT CHANGE UP (ref 27–34)
MCHC RBC-ENTMCNC: 33.4 GM/DL — SIGNIFICANT CHANGE UP (ref 32–36)
MCHC RBC-ENTMCNC: 33.9 GM/DL — SIGNIFICANT CHANGE UP (ref 32–36)
MCV RBC AUTO: 87.7 FL — SIGNIFICANT CHANGE UP (ref 80–100)
MCV RBC AUTO: 88.4 FL — SIGNIFICANT CHANGE UP (ref 80–100)
MONOCYTES # BLD AUTO: 0.48 K/UL — SIGNIFICANT CHANGE UP (ref 0–0.9)
MONOCYTES # BLD AUTO: 0.6 K/UL — SIGNIFICANT CHANGE UP (ref 0–0.9)
MONOCYTES NFR BLD AUTO: 6 % — SIGNIFICANT CHANGE UP (ref 2–14)
MONOCYTES NFR BLD AUTO: 7.6 % — SIGNIFICANT CHANGE UP (ref 2–14)
NEUTROPHILS # BLD AUTO: 4.77 K/UL — SIGNIFICANT CHANGE UP (ref 1.8–7.4)
NEUTROPHILS # BLD AUTO: 8 K/UL — HIGH (ref 1.8–7.4)
NEUTROPHILS NFR BLD AUTO: 75.3 % — SIGNIFICANT CHANGE UP (ref 43–77)
NEUTROPHILS NFR BLD AUTO: 80.6 % — HIGH (ref 43–77)
NITRITE UR-MCNC: NEGATIVE — SIGNIFICANT CHANGE UP
NRBC # BLD: 0 /100 WBCS — SIGNIFICANT CHANGE UP (ref 0–0)
PH UR: 7 — SIGNIFICANT CHANGE UP (ref 5–8)
PLATELET # BLD AUTO: 257 K/UL — SIGNIFICANT CHANGE UP (ref 150–400)
PLATELET # BLD AUTO: 267 K/UL — SIGNIFICANT CHANGE UP (ref 150–400)
POTASSIUM SERPL-MCNC: 3.4 MMOL/L — LOW (ref 3.5–5.3)
POTASSIUM SERPL-SCNC: 3.4 MMOL/L — LOW (ref 3.5–5.3)
PROCALCITONIN SERPL-MCNC: 0.06 NG/ML — HIGH (ref 0–0.04)
PROT SERPL-MCNC: 6.3 G/DL — SIGNIFICANT CHANGE UP (ref 6–8.3)
PROT UR-MCNC: NEGATIVE — SIGNIFICANT CHANGE UP
RBC # BLD: 3.58 M/UL — LOW (ref 3.8–5.2)
RBC # BLD: 3.97 M/UL — SIGNIFICANT CHANGE UP (ref 3.8–5.2)
RBC # FLD: 12.7 % — SIGNIFICANT CHANGE UP (ref 10.3–14.5)
RBC # FLD: 12.9 % — SIGNIFICANT CHANGE UP (ref 10.3–14.5)
SODIUM SERPL-SCNC: 142 MMOL/L — SIGNIFICANT CHANGE UP (ref 135–145)
SP GR SPEC: 1 — LOW (ref 1.01–1.02)
UROBILINOGEN FLD QL: NEGATIVE — SIGNIFICANT CHANGE UP
WBC # BLD: 6.33 K/UL — SIGNIFICANT CHANGE UP (ref 3.8–10.5)
WBC # BLD: 9.93 K/UL — SIGNIFICANT CHANGE UP (ref 3.8–10.5)
WBC # FLD AUTO: 6.33 K/UL — SIGNIFICANT CHANGE UP (ref 3.8–10.5)
WBC # FLD AUTO: 9.93 K/UL — SIGNIFICANT CHANGE UP (ref 3.8–10.5)

## 2018-07-25 PROCEDURE — 12345: CPT | Mod: NC

## 2018-07-25 PROCEDURE — 99223 1ST HOSP IP/OBS HIGH 75: CPT | Mod: AI,GC

## 2018-07-25 PROCEDURE — 99232 SBSQ HOSP IP/OBS MODERATE 35: CPT

## 2018-07-25 PROCEDURE — 99223 1ST HOSP IP/OBS HIGH 75: CPT

## 2018-07-25 PROCEDURE — 99285 EMERGENCY DEPT VISIT HI MDM: CPT

## 2018-07-25 PROCEDURE — 99233 SBSQ HOSP IP/OBS HIGH 50: CPT

## 2018-07-25 PROCEDURE — 93010 ELECTROCARDIOGRAM REPORT: CPT

## 2018-07-25 PROCEDURE — 74177 CT ABD & PELVIS W/CONTRAST: CPT | Mod: 26

## 2018-07-25 RX ORDER — PROCHLORPERAZINE MALEATE 5 MG
10 TABLET ORAL ONCE
Qty: 0 | Refills: 0 | Status: COMPLETED | OUTPATIENT
Start: 2018-07-25 | End: 2018-07-25

## 2018-07-25 RX ORDER — ESCITALOPRAM OXALATE 10 MG/1
35 TABLET, FILM COATED ORAL DAILY
Qty: 0 | Refills: 0 | Status: DISCONTINUED | OUTPATIENT
Start: 2018-07-25 | End: 2018-07-27

## 2018-07-25 RX ORDER — PANTOPRAZOLE SODIUM 20 MG/1
40 TABLET, DELAYED RELEASE ORAL ONCE
Qty: 0 | Refills: 0 | Status: COMPLETED | OUTPATIENT
Start: 2018-07-25 | End: 2018-07-25

## 2018-07-25 RX ORDER — CLONAZEPAM 1 MG
0.5 TABLET ORAL ONCE
Qty: 0 | Refills: 0 | Status: DISCONTINUED | OUTPATIENT
Start: 2018-07-25 | End: 2018-07-25

## 2018-07-25 RX ORDER — ONDANSETRON 8 MG/1
4 TABLET, FILM COATED ORAL ONCE
Qty: 0 | Refills: 0 | Status: COMPLETED | OUTPATIENT
Start: 2018-07-25 | End: 2018-07-25

## 2018-07-25 RX ORDER — POTASSIUM CHLORIDE 20 MEQ
40 PACKET (EA) ORAL EVERY 4 HOURS
Qty: 0 | Refills: 0 | Status: COMPLETED | OUTPATIENT
Start: 2018-07-25 | End: 2018-07-25

## 2018-07-25 RX ORDER — HYDROMORPHONE HYDROCHLORIDE 2 MG/ML
1 INJECTION INTRAMUSCULAR; INTRAVENOUS; SUBCUTANEOUS ONCE
Qty: 0 | Refills: 0 | Status: DISCONTINUED | OUTPATIENT
Start: 2018-07-25 | End: 2018-07-25

## 2018-07-25 RX ORDER — ESCITALOPRAM OXALATE 10 MG/1
1 TABLET, FILM COATED ORAL
Qty: 0 | Refills: 0 | COMMUNITY

## 2018-07-25 RX ORDER — SODIUM CHLORIDE 9 MG/ML
1000 INJECTION INTRAMUSCULAR; INTRAVENOUS; SUBCUTANEOUS
Qty: 0 | Refills: 0 | Status: DISCONTINUED | OUTPATIENT
Start: 2018-07-25 | End: 2018-07-27

## 2018-07-25 RX ORDER — SOD SULF/SODIUM/NAHCO3/KCL/PEG
4000 SOLUTION, RECONSTITUTED, ORAL ORAL ONCE
Qty: 0 | Refills: 0 | Status: COMPLETED | OUTPATIENT
Start: 2018-07-25 | End: 2018-07-25

## 2018-07-25 RX ORDER — ONDANSETRON 8 MG/1
4 TABLET, FILM COATED ORAL THREE TIMES A DAY
Qty: 0 | Refills: 0 | Status: DISCONTINUED | OUTPATIENT
Start: 2018-07-25 | End: 2018-07-25

## 2018-07-25 RX ORDER — SUCRALFATE 1 G
1 TABLET ORAL
Qty: 0 | Refills: 0 | Status: DISCONTINUED | OUTPATIENT
Start: 2018-07-25 | End: 2018-07-27

## 2018-07-25 RX ORDER — CLONAZEPAM 1 MG
0.5 TABLET ORAL
Qty: 0 | Refills: 0 | Status: DISCONTINUED | OUTPATIENT
Start: 2018-07-25 | End: 2018-07-27

## 2018-07-25 RX ORDER — PANTOPRAZOLE SODIUM 20 MG/1
40 TABLET, DELAYED RELEASE ORAL DAILY
Qty: 0 | Refills: 0 | Status: DISCONTINUED | OUTPATIENT
Start: 2018-07-25 | End: 2018-07-27

## 2018-07-25 RX ORDER — ESCITALOPRAM OXALATE 10 MG/1
20 TABLET, FILM COATED ORAL DAILY
Qty: 0 | Refills: 0 | Status: DISCONTINUED | OUTPATIENT
Start: 2018-07-25 | End: 2018-07-25

## 2018-07-25 RX ORDER — SOD SULF/SODIUM/NAHCO3/KCL/PEG
4000 SOLUTION, RECONSTITUTED, ORAL ORAL ONCE
Qty: 0 | Refills: 0 | Status: DISCONTINUED | OUTPATIENT
Start: 2018-07-25 | End: 2018-07-25

## 2018-07-25 RX ORDER — ONDANSETRON 8 MG/1
4 TABLET, FILM COATED ORAL EVERY 6 HOURS
Qty: 0 | Refills: 0 | Status: DISCONTINUED | OUTPATIENT
Start: 2018-07-25 | End: 2018-07-27

## 2018-07-25 RX ADMIN — SODIUM CHLORIDE 80 MILLILITER(S): 9 INJECTION INTRAMUSCULAR; INTRAVENOUS; SUBCUTANEOUS at 06:27

## 2018-07-25 RX ADMIN — Medication 4000 MILLILITER(S): at 17:04

## 2018-07-25 RX ADMIN — Medication 0.5 MILLIGRAM(S): at 12:09

## 2018-07-25 RX ADMIN — Medication 40 MILLIEQUIVALENT(S): at 10:28

## 2018-07-25 RX ADMIN — ONDANSETRON 4 MILLIGRAM(S): 8 TABLET, FILM COATED ORAL at 16:57

## 2018-07-25 RX ADMIN — ONDANSETRON 4 MILLIGRAM(S): 8 TABLET, FILM COATED ORAL at 19:49

## 2018-07-25 RX ADMIN — PANTOPRAZOLE SODIUM 40 MILLIGRAM(S): 20 TABLET, DELAYED RELEASE ORAL at 02:16

## 2018-07-25 RX ADMIN — Medication 10 MILLIGRAM(S): at 02:16

## 2018-07-25 RX ADMIN — ESCITALOPRAM OXALATE 35 MILLIGRAM(S): 10 TABLET, FILM COATED ORAL at 12:08

## 2018-07-25 RX ADMIN — HYDROMORPHONE HYDROCHLORIDE 0.5 MILLIGRAM(S): 2 INJECTION INTRAMUSCULAR; INTRAVENOUS; SUBCUTANEOUS at 05:31

## 2018-07-25 RX ADMIN — Medication 1 GRAM(S): at 06:27

## 2018-07-25 RX ADMIN — Medication 10 MILLIGRAM(S): at 00:15

## 2018-07-25 RX ADMIN — Medication 1 GRAM(S): at 17:05

## 2018-07-25 RX ADMIN — SODIUM CHLORIDE 80 MILLILITER(S): 9 INJECTION INTRAMUSCULAR; INTRAVENOUS; SUBCUTANEOUS at 21:37

## 2018-07-25 RX ADMIN — PANTOPRAZOLE SODIUM 40 MILLIGRAM(S): 20 TABLET, DELAYED RELEASE ORAL at 12:10

## 2018-07-25 RX ADMIN — Medication 0.5 MILLIGRAM(S): at 10:28

## 2018-07-25 RX ADMIN — Medication 1 GRAM(S): at 12:10

## 2018-07-25 RX ADMIN — Medication 40 MILLIEQUIVALENT(S): at 13:02

## 2018-07-25 RX ADMIN — SODIUM CHLORIDE 1000 MILLILITER(S): 9 INJECTION INTRAMUSCULAR; INTRAVENOUS; SUBCUTANEOUS at 02:16

## 2018-07-25 RX ADMIN — Medication 0.5 MILLIGRAM(S): at 21:37

## 2018-07-25 NOTE — H&P ADULT - PROBLEM SELECTOR PLAN 2
- continue Klonipin and Lexapro - mildly elevated AST 92  - worked up in past without definitive resolution  - f/u GI recs

## 2018-07-25 NOTE — CONSULT NOTE ADULT - ASSESSMENT
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   Overall symptoms likely due to functional bowel disorder worsened by underlying anxiety, panic attacks, and demanding profession ( she was to be an acting principle for the summer).  She is s/p EGD/colonoscopy/capsule endoscopy, two CT scans of abdomen and pelvis with IV contrast since two months, gastric emptying scan, abdominal ultrasound.    Most recent CT scan with IV contrast showed mild thickening of gastric wall (recent EGD gastric bezoar encountered limiting exam) and possible mild thickening of descending colon vs underdistention.      Will plan for EGD/colonoscopy tomorrow.       Recent elevated LFTs thought due to med toxicity - blood test for elevated LFTs done in office negative except for positive titer to anti-smooth muscle antibody.  Continue monitoring LFTs.

## 2018-07-25 NOTE — H&P ADULT - NSHPREVIEWOFSYSTEMS_GEN_ALL_CORE
Constitutional: denies fever, chills, diaphoresis   HEENT: denies blurry vision  Respiratory: denies SOB, FREGOSO, cough, sputum production, wheezing, hemoptysis  Cardiovascular: denies chest pain, palpitations, edema  Gastrointestinal: endorses nausea and diarrhea, constipation, abdominal pain. denies vomiting, melena, hematochezia   Genitourinary: denies dysuria, frequency, urgency, hematuria   Skin/Breast: denies rash, itching  Musculoskeletal: denies myalgias, joint swelling, muscle weakness  Neurologic: denies headache, weakness, dizziness, paresthesias, numbness/tingling  Psychiatric: denies feeling anxious, depressed, suicidal, homicidal thoughts  Hematology/Oncology: denies bruising, tender or enlarged lymph nodes   ROS negative except as noted above

## 2018-07-25 NOTE — PROGRESS NOTE ADULT - ASSESSMENT
33 yo f pmh anxiety, GERD, cholelithiasis s/p cholecystectomy, nephrolithasis and intermittent diarrhea presents with complaint of abdominal pain , nausea and non bloody diarrhea x several weeks. Pt states this has been going on for a very long time- years- she has presented to the ED on 4 occasions due to the severity of the diarrhea. She has had an extensive outpatient work up which was been WNL up until this point.     IBS: previous negative work up as outpatient. Likely flare associated with severe anxiety. Mild colonic thickening on CT. continue NPO. Bowel rest. Protonix . Zofran.  GI to see pt this evening.     Anxiety: significant anxiety. Not well controlled on lexapro and klonopin. Anxiety likely contributing to diarrhea and abdominal pain.  Psych Consult Dr. Castellanos     Transaminitis: Mild elevation of LFTs , transient. No etoh use. Only medications are klonopin and lexapro. possibly 2/2 medication use however unlikely as they are transient.     GERD: continue protonix.     h/o + Toxoplasmosis: to be evaluated by Dr. Sandoval.     DVT ppx: Encourage ambulation. 33 yo f pmh anxiety, GERD, cholelithiasis s/p cholecystectomy, nephrolithasis and intermittent diarrhea presents with complaint of abdominal pain , nausea and non bloody diarrhea x several weeks. Pt states this has been going on for a very long time- years- she has presented to the ED on 4 occasions due to the severity of the diarrhea. She has had an extensive outpatient work up which was been WNL up until this point.     IBS: previous negative work up as outpatient. Likely flare associated with severe anxiety. Mild colonic thickening on CT. continue NPO. Bowel rest. Protonix . Zofran. carafate. GI to see pt this evening. c/w IVF @ 80 cc/hr    Anxiety: significant anxiety. Not well controlled on lexapro and klonopin. Anxiety likely contributing to diarrhea and abdominal pain.  Psych Consult Dr. Castellanos     Transaminitis: Mild elevation of LFTs , transient. No etoh use. Only medications are klonopin and lexapro. possibly 2/2 medication use however unlikely as they are transient.     GERD: continue protonix.     h/o + Toxoplasmosis: to be evaluated by Dr. Sandoval.     DVT ppx: Encourage ambulation.

## 2018-07-25 NOTE — ED ADULT NURSE REASSESSMENT NOTE - NS ED NURSE REASSESS COMMENT FT1
Pt with assigned bed, nurse to bedside to assess patient ant take VS, pt reports feeling anxious and requesting additional dose of Klonopin. Pt medicated 1028 with first dose of PRN Klonipin. Pt reassured and updated on plan of care. Spoke to Dr. Vizcarra and updated on patient anxiety.  No orders at this time, Dr. Calloway will assess patient and follow up.

## 2018-07-25 NOTE — CONSULT NOTE ADULT - SUBJECTIVE AND OBJECTIVE BOX
Full note to follow  Long-standing episodic diarrhea with crampy abdominal pain for years (since at least 2012), with evaluation by multiple physicians but unrevealing work up.  Patient for the last month with marked worsening in symptoms. Has lost about 20# and has diarrhea has limited her ability to work and cannot even go out to a movie with a friend for fear of an accident.  Anxiety, long-standing, magnifying symptoms but do not explain them  CT here not overly impressive  Recent +IgM for Toxoplasmosis, would not explain symptoms (and +IgM would not be expected protracted symptomatic illness)  +Aphthous stomatitis. +Nephrolithiasis. No eye or dermatologic issues.  Has elevated LFT, unclear etiology. Never Bx'd. Has had +Autoimmune markers. No known PSC. No joint pains but has some LE myalgias. No Spine issues.  Previous Bx have not revealed amyloid  Denies hx hematochezia or melena.  No fevers  States never sexually active with no risk factors.  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 difficile presently  ASCA  HIV  ESR LONNY RF ANCA CRP AMA Anti-Sm  Will recheck Toxo IgM/IgG. If IgM stil + but IgG negative likely false positive here  I see no role for antibiotics presently  Thank you for the courtesy of this referral.  Salinas Sandoval MD  [phone]  -----------------------------  [facility], Division of Infectious Diseases  Rossana Sandoval, SERVANDO Black, FABRICIO MORENO, STEPHANIE  32y, Female  [id number]    HPI--  *** insert HPI ***    PMH/PSH--  IBS (irritable bowel syndrome)  Anxiety  Cholelithiasis  S/P cholecystectomy  History of bilateral breast reduction surgery  S/P sinus surgery  S/P rhinoplasty      Allergies--      Medications--  Antibiotics:   Immunologic:   Other: clonazePAM Tablet PRN  dicyclomine PRN  escitalopram  ondansetron Injectable PRN  ondansetron Injectable PRN  pantoprazole  Injectable  polyethylene glycol/electrolyte Solution.  sodium chloride 0.9%.  sucralfate suspension      Social History--  EtOH: denies ***  Tobacco: denies ***  Drug Use: denies ***    Family/Marital History--  No pertinent family history in first degree relatives    Remainder not relevant to clinical concern.    Travel/Environmental/Occupational History:  *** insert T/E/O Hx ***    Review of Systems:  A >=10-point review of systems was obtained.     Pertinent positives and negatives--  Constitutional: No fevers. No Chills. No Rigors.   Eyes:  ENMT:  Cardiovascular: No chest pain. No palpitations.  Respiratory: No shortness of breath. No cough.  Gastrointestinal: No nausea or vomiting. No diarrhea or constipation.   Genitourinary:  Musculoskeletal:  Skin:  Neurologic:  Psychiatric: Pleasant. Appropriate affect.  Endocrine:  Heme/Lymphatic:  Allergy/Immunologic:    Review of systems otherwise negative except as previously noted.    Physical Exam--  Vital Signs: T(F): 98.8 (07-25-18 @ 14:02), Max: 98.8 (07-25-18 @ 14:02)  HR: 79 (07-25-18 @ 14:02)  BP: 116/75 (07-25-18 @ 14:02)  RR: 14 (07-25-18 @ 14:02)  SpO2: 97% (07-25-18 @ 14:02)  Wt(kg): --  General: Nontoxic-appearing Female in no acute distress.  HEENT: AT/NC. PERRL. EOMI. Anicteric. Conjunctiva pink and moist. Oropharynx clear. Dentition fair.  Neck: Not rigid. No sense of mass.  Nodes: None palpable.  Lungs: Clear bilaterally without rales, wheezing or rhonchi  Heart: Regular rate and rhythm. No Murmur. No rub. No gallop. No palpable thrill.  Abdomen: Bowel sounds present and normoactive. Soft. Nondistended. Nontender. No sense of mass. No organomegaly.  Back: No spinal tenderness. No costovertebral angle tenderness.   Extremities: No cyanosis or clubbing. No edema.   Skin: Warm. Dry. Good turgor. No rash. No vasculitic stigmata.  Psychiatric: Appropriate affect and mood for situation.         Laboratory & Imaging Data--  CBC                        10.5   6.33  )-----------( 257      ( 25 Jul 2018 05:52 )             31.4       Chemistries  07-25    142  |  109<H>  |  6<L>  ----------------------------<  104<H>  3.4<L>   |  26  |  0.64    Ca    7.9<L>      25 Jul 2018 05:52    TPro  6.3  /  Alb  3.5  /  TBili  0.4  /  DBili  x   /  AST  292<H>  /  ALT  173<H>  /  AlkPhos  74  07-25      Culture Data Full note to follow  Long-standing episodic diarrhea with crampy abdominal pain for years (since at least 2012), with evaluation by multiple physicians but unrevealing work up.  Patient for the last month with marked worsening in symptoms. Has lost about 20# and has diarrhea has limited her ability to work and cannot even go out to a movie with a friend for fear of an accident.  Anxiety, long-standing, magnifying symptoms but do not explain them  CT here not overly impressive  Recent +IgM for Toxoplasmosis, would not explain symptoms (and +IgM would not be expected protracted symptomatic illness)  +Aphthous stomatitis. +Nephrolithiasis. No eye or dermatologic issues.  Has elevated LFT, unclear etiology. Never Bx'd. Has had +Autoimmune markers. No known PSC. No joint pains but has some LE myalgias. No Spine issues.  Previous Bx have not revealed amyloid  Denies hx hematochezia or melena.  No fevers  States never sexually active with no risk factors.  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 difficile presently  ASCA  HIV  ESR LONNY RF ANCA CRP AMA Anti-Sm  Will recheck Toxo IgM/IgG. If IgM stil + but IgG negative likely false positive here  I see no role for antibiotics presently  Thank you for the courtesy of this referral.  Salinas Sandoval MD  [phone]  -----------------------------  [facility], Division of Infectious Diseases  Rossana Sandoval, FABRICIO Paula NICOLE  32y, Female  [phone]    HPI--  32F with history of intermittent but persistent diarrheal episodes since at least 2012, anxiety disorder on lexapro/klonopin PRN, cholecystectomy, breast reduction, rhinoplasty/sinus surgery, who presents with worsening diarrhea over the past month accompanied by spasmodic abdominal pain. Latter is usually midepigastric to right sided. No hematochezia or melena. Denies fevers, chills, or rigors. No SPb, CP or cough. No urinary symptoms. Patient states she has lost about 20# in the last month because immediately after eating she has diarrhea. Sometime states pill casts pass per rectum shortly after being ingested. Patient's diarrhea has progressed to the point where she goes to urgent care centers to get hydrated. Diarrhea is impacting her work and social life (e.g. afraid to go to the movies because of fear fo diarrhea). Last antibiotics 2/2018, reportedly C. difficile has been negative previously.     Workup for her abdominal complaints has been extensive inclding EGD's, colonoscopies, capsule endoscopy, extensive blood work, gastric emptying studies, most of which are not available for review, have not determined an etiology for her symptoms. She has seen multiple gastroenterologists. She has been diagnosed with IBS. There is a strong family history of IBD but workup previously has not been diagnostic of such. She also has had persistent elevation in LFT that are unexplained. Patient asked her physician to draw a toxoplasmosis titer (she has cats) and it was positive for IgM.     PMH/PSH--  IBS (irritable bowel syndrome)  Anxiety  Cholelithiasis  S/P cholecystectomy  History of bilateral breast reduction surgery  S/P sinus surgery  S/P rhinoplasty      Allergies--steristrips -> rash      Medications--  Antibiotics:   Immunologic:   Other: clonazePAM Tablet PRN  dicyclomine PRN  escitalopram  ondansetron Injectable PRN  ondansetron Injectable PRN  pantoprazole  Injectable  polyethylene glycol/electrolyte Solution.  sodium chloride 0.9%.  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     Pertinent positives and negatives--  Constitutional: No fevers. No Chills. No Rigors.   Eyes: no eye issues/scleritis etc  ENMT: +aphthous stomatitis. Intermittent sore throat  Cardiovascular: No chest pain. No palpitations.  Respiratory: No shortness of breath. No cough.  Gastrointestinal: No nausea or vomiting. +diarrhea or constipation. +GERD  Genitourinary: + nephrolithiasis s/p cysto/extraction  Musculoskeletal: no joint pain, kane LE muscle pan lately  Skin: No rash/P. gangrenosum etc  Neurologic: denies.   Psychiatric: Pleasant. Anxious affect.  Endocrine: denies.   Heme/Lymphatic: States "bumps" posterior neck (?LN)   Allergy/Immunologic: denies.     Review of systems otherwise negative except as previously noted.    Physical Exam--  Vital Signs: T(F): 98.8 (07-25-18 @ 14:02), Max: 98.8 (07-25-18 @ 14:02)  HR: 79 (07-25-18 @ 14:02)  BP: 116/75 (07-25-18 @ 14:02)  RR: 14 (07-25-18 @ 14:02)  SpO2: 97% (07-25-18 @ 14:02)  Wt(kg): --  General: Nontoxic-appearing Female in no acute distress.  HEENT: AT/NC. PERRL. EOMI. Anicteric. Conjunctiva pink and moist. Oropharynx clear. Dentition fair.  Neck: Not rigid. No sense of mass.  Nodes: None palpable.  Lungs: Clear bilaterally without rales, wheezing or rhonchi  Heart: Regular rate and rhythm. No Murmur. No rub. No gallop. No palpable thrill.  Abdomen: Bowel sounds present and normoactive. Soft. Nondistended. Mildly tender. No G/R.  No sense of mass. No organomegaly.  Back: No spinal tenderness. No costovertebral angle tenderness.   Extremities: No cyanosis or clubbing. No edema.   Skin: Warm. Dry. Good turgor. No rash. No vasculitic stigmata.  Psychiatric: Appropriate affect and mood for situation.         Laboratory & Imaging Data--  CBC                        10.5   6.33  )-----------( 257      ( 25 Jul 2018 05:52 )             31.4       Chemistries  07-25    142  |  109<H>  |  6<L>  ----------------------------<  104<H>  3.4<L>   |  26  |  0.64    Ca    7.9<L>      25 Jul 2018 05:52    TPro  6.3  /  Alb  3.5  /  TBili  0.4  /  DBili  x   /  AST  292<H>  /  ALT  173<H>  /  AlkPhos  74  07-25    < from: CT Abdomen and Pelvis w/ Oral Cont and w/ IV Cont (07.25.18 @ 00:59) >    EXAM:  CT ABDOMEN AND PELVIS OC IC                            PROCEDURE DATE:  07/25/2018          INTERPRETATION:  CT ABDOMEN AND PELVIS WITH CONTRAST    INDICATION: Abdominal pain.    TECHNIQUE: Contrast enhanced CT of the abdomen and pelvis.     90 mL of Omnipaque 350 contrast material was injected IV.    COMPARISON: 6/23/2018.    FINDINGS:    Lower Thorax: No consolidation or effusion.        Liver: No suspicious lesions.      Biliary: No dilatation. Cholecystectomy.  Spleen: No suspicious lesions.      Pancreas: No inflammatory changes or ductal dilatation.      Adrenals: Normal.      Kidneys: No hydronephrosis or solid mass. Punctate nonobstructing left   intrarenal calculus.  Vessels: Normal caliber.        GI tract: No evidence of small bowel obstruction. Mild gastric wall   thickening is seen. Peritoneum/retroperitoneum and mesentery: No free   air. No organized fluid collection. No adenopathy.        Pelvic organs/Bladder: No pelvic masses. Bladder is normal.        Abdominal wall: Unremarkable.  Bones and soft tissues: No destructive lesion.        IMPRESSION:    Mild gastric wall thickening, may reflect gastritis in the appropriate   clinical setting. Similarly there is mild descending colonic wall   thickening, which may be due to underdistention or mild inflammation.   Clinical correlation with patient's symptoms is recommended. Endoscopy   can be considered as warranted.    Punctate nonobstructing left intrarenal calculus      SAMANTHA MCDONALD M.D., ATTENDING RADIOLOGIST  This document has been electronically signed. Jul 25 2018  1:31AM  < end of copied text >        Culture Data  None

## 2018-07-25 NOTE — CONSULT NOTE ADULT - PROBLEM SELECTOR RECOMMENDATION 2
EGD/colonoscopy tomorrow.  Stool studies for O&P, culture, c-diff, giardia negative in June of 2018  May consider repeating it while in hospital.   Continue IV hydration

## 2018-07-25 NOTE — CONSULT NOTE ADULT - SUBJECTIVE AND OBJECTIVE BOX
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     From office notes:   Patient has beeb having chronic diarrhea - "very bad diarrhea" to the poioint where has has to go to urgent care for IV hydration.  There are days where she feesl constiapted.  She has urgency and fecal incontinence.   She has "severe spasms" in abdomen - mainly right side.  She takes dicyclomine and anabelle yuki twice a day.   She has hoarseness of throat, hx of heartburn.   She lost 30 lbs since past three months unintentionally.  She has seen GI and has had 3 colonoscopies - last one in 9/2017 all normal.  Capsule endoscopy done a few months ago was normal.            ICU Vital Signs Last 24 Hrs  T(C): 37.1 (25 Jul 2018 14:02), Max: 37.1 (25 Jul 2018 14:02)  T(F): 98.8 (25 Jul 2018 14:02), Max: 98.8 (25 Jul 2018 14:02)  HR: 79 (25 Jul 2018 14:02) (79 - 96)  BP: 116/75 (25 Jul 2018 14:02) (97/68 - 116/75)  BP(mean): --  ABP: --  ABP(mean): --  RR: 14 (25 Jul 2018 14:02) (14 - 16)  SpO2: 97% (25 Jul 2018 14:02) (97% - 99%)      PHYSICAL EXAM:      Constitutional:  Comfortable appearing  HEENT: NCAT, anicteric sclera, moist mucous membranes  Respiratory:  CTA b/l, no w/r/r  Cardiovascular:  nl S1, S2, no m/r/g  Gastrointestinal:  Soft, +BS, NT, ND, no hepatosplenomegally  Extremities:  no E/C/C  Neurological:  Alert and orriented x 3.  Skin:  no Jaundice  Lymph Nodes:  no lymphadenopathy in neck, no supraclavicular adenopathy  Musculoskeletal:  normal gait  Psychiatric:  Normal Mood and affect                            10.5   6.33  )-----------( 257      ( 25 Jul 2018 05:52 )             31.4       07-25    142  |  109<H>  |  6<L>  ----------------------------<  104<H>  3.4<L>   |  26  |  0.64    Ca    7.9<L>      25 Jul 2018 05:52    TPro  6.3  /  Alb  3.5  /  TBili  0.4  /  DBili  x   /  AST  292<H>  /  ALT  173<H>  /  AlkPhos  74  07-25          EXAM:  CT ABDOMEN AND PELVIS OC IC                            PROCEDURE DATE:  07/25/2018          INTERPRETATION:  CT ABDOMEN AND PELVIS WITH CONTRAST    INDICATION: Abdominal pain.    TECHNIQUE: Contrast enhanced CT of the abdomen and pelvis.     90 mL of Omnipaque 350 contrast material was injected IV.    COMPARISON: 6/23/2018.    FINDINGS:    Lower Thorax: No consolidation or effusion.        Liver: No suspicious lesions.      Biliary: No dilatation. Cholecystectomy.  Spleen: No suspicious lesions.      Pancreas: No inflammatory changes or ductal dilatation.      Adrenals: Normal.      Kidneys: No hydronephrosis or solid mass. Punctate nonobstructing left   intrarenal calculus.  Vessels: Normal caliber.        GI tract: No evidence of small bowel obstruction. Mild gastric wall   thickening is seen. Peritoneum/retroperitoneum and mesentery: No free   air. No organized fluid collection. No adenopathy.        Pelvic organs/Bladder: No pelvic masses. Bladder is normal.        Abdominal wall: Unremarkable.  Bones and soft tissues: No destructive lesion.        IMPRESSION:    Mild gastric wall thickening, may reflect gastritis in the appropriate   clinical setting. Similarly there is mild descending colonic wall   thickening, which may be due to underdistention or mild inflammation.   Clinical correlation with patient's symptoms is recommended. Endoscopy   can be considered as warranted.    Punctate nonobstructing left intrarenal calculus. 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  She has had a few ER visits ([place] and [place]) for similar complaints.     She has "severe spasms" in abdomen - mainly right side associated with nausea without vomiting.    She tried taking dicyclomine and Yoly yuki for spams but did not help.   She also has a prolonged hx of chronic diarrhea - "very bad diarrhea" to the point where she has to go to urgent care for IV hydration.  She has urgency and at times fecal incontinence.  Then there are days were she feels constipated.       She also has a hx of hoarseness of throat, hx of heartburn.   She lost 30 lbs since past three months unintentionally.      Paternal grandmother had ileitis, mother has Crohn's disease.  Denies GI cancers in family.     She has seen GI in past and has had 3 colonoscopies by private GI- last one in 9/2017 all normal.  Capsule endoscopy done by private GI a few months ago was normal.  She had a recent upper endoscopy by me that was limited study since food seen in stomach.  Duodenal bx were negative for celiac disease.  She had a follow up gastric emptying scan that was negative for gastroparesis.          Suffers from anxiety and panic attacks - on Lexapro and Klonopin.            On June 23, 2018 she had a CT scan of the abdomen and pelvis with IV contrast and found to have a 3.7 cm left ovarian cyst and 2.6 cm right ovarian cyst - otherwise normal.     On July 2018 she was found to have AST of 247, ALT of 131, with normal Tbili, normal alkphos, and lipase.   Blood work send of to r/o causes of elevated LFTs - mainly unremarkable except for positive LONNY.  Elevated LFTs thought due to meds.  She was recommended to follow up LFTs, and repeat LONNY at some point.                ICU Vital Signs Last 24 Hrs  T(C): 37.1 (25 Jul 2018 14:02), Max: 37.1 (25 Jul 2018 14:02)  T(F): 98.8 (25 Jul 2018 14:02), Max: 98.8 (25 Jul 2018 14:02)  HR: 79 (25 Jul 2018 14:02) (79 - 96)  BP: 116/75 (25 Jul 2018 14:02) (97/68 - 116/75)  BP(mean): --  ABP: --  ABP(mean): --  RR: 14 (25 Jul 2018 14:02) (14 - 16)  SpO2: 97% (25 Jul 2018 14:02) (97% - 99%)      PHYSICAL EXAM:      Constitutional:  Comfortable appearing  HEENT: NCAT, anicteric sclera, moist mucous membranes  Respiratory:  CTA b/l, no w/r/r  Cardiovascular:  nl S1, S2, no m/r/g  Gastrointestinal:  Soft, +BS, NT, ND, no hepatosplenomegally  Extremities:  no E/C/C  Neurological:  Alert and orriented x 3.  Skin:  no Jaundice  Lymph Nodes:  no lymphadenopathy in neck, no supraclavicular adenopathy  Musculoskeletal:  normal gait  Psychiatric:  Normal Mood and affect                            10.5   6.33  )-----------( 257      ( 25 Jul 2018 05:52 )             31.4       07-25    142  |  109<H>  |  6<L>  ----------------------------<  104<H>  3.4<L>   |  26  |  0.64    Ca    7.9<L>      25 Jul 2018 05:52    TPro  6.3  /  Alb  3.5  /  TBili  0.4  /  DBili  x   /  AST  292<H>  /  ALT  173<H>  /  AlkPhos  74  07-25          EXAM:  CT ABDOMEN AND PELVIS OC IC                            PROCEDURE DATE:  07/25/2018          INTERPRETATION:  CT ABDOMEN AND PELVIS WITH CONTRAST    INDICATION: Abdominal pain.    TECHNIQUE: Contrast enhanced CT of the abdomen and pelvis.     90 mL of Omnipaque 350 contrast material was injected IV.    COMPARISON: 6/23/2018.    FINDINGS:    Lower Thorax: No consolidation or effusion.        Liver: No suspicious lesions.      Biliary: No dilatation. Cholecystectomy.  Spleen: No suspicious lesions.      Pancreas: No inflammatory changes or ductal dilatation.      Adrenals: Normal.      Kidneys: No hydronephrosis or solid mass. Punctate nonobstructing left   intrarenal calculus.  Vessels: Normal caliber.        GI tract: No evidence of small bowel obstruction. Mild gastric wall   thickening is seen. Peritoneum/retroperitoneum and mesentery: No free   air. No organized fluid collection. No adenopathy.        Pelvic organs/Bladder: No pelvic masses. Bladder is normal.        Abdominal wall: Unremarkable.  Bones and soft tissues: No destructive lesion.        IMPRESSION:    Mild gastric wall thickening, may reflect gastritis in the appropriate   clinical setting. Similarly there is mild descending colonic wall   thickening, which may be due to underdistention or mild inflammation.   Clinical correlation with patient's symptoms is recommended. Endoscopy   can be considered as warranted.    Punctate nonobstructing left intrarenal calculus. 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  She has had a few ER visits ([place] and [place]) for similar complaints.     She has "severe spasms" in abdomen - mainly right side associated with nausea without vomiting.    She tried taking dicyclomine and Yoly yuki for spams but did not help.   She also has a prolonged hx of chronic diarrhea - "very bad diarrhea" to the point where she has to go to urgent care for IV hydration.  She has urgency and at times fecal incontinence.  Then there are days were she feels constipated.       She also has a hx of hoarseness of throat, hx of heartburn.   She lost 30 lbs since past three months unintentionally.      Paternal grandmother had ileitis, mother has Crohn's disease.  Denies GI cancers in family.     She has seen GI in past and has had 3 colonoscopies by private GI- last one in 9/2017 all normal.  Capsule endoscopy done by private GI a few months ago was normal.  She had a recent upper endoscopy by me that was limited study since food seen in stomach.  Duodenal bx were negative for celiac disease.  She had a follow up gastric emptying scan that was negative for gastroparesis.          Suffers from anxiety and panic attacks - on Lexapro and Klonopin.      On June 23, 2018 she had a CT scan of the abdomen and pelvis with IV contrast and found to have a 3.7 cm left ovarian cyst and 2.6 cm right ovarian cyst - otherwise normal.     On July 2018 she was found to have AST of 247, ALT of 131, with normal Tbili, normal alkphos, and lipase.   Blood work send of to r/o causes of elevated LFTs - mainly unremarkable except for positive Antismooth muscle antibody, but negative for LONNY and IgG.  Viral hepatitis serologies negative.  Elevated LFTs thought due to meds.  She was recommended to follow up LFTs, and repeat LONNY at some point.      On July 2018 she had abdominal ultrasound that showed normal liver and hepatobiliary tree.     On June 2018 stool tests negative.        Review of systems as above, all other ROS negative.     MEDICATIONS  (STANDING):  escitalopram 35 milliGRAM(s) Oral daily  pantoprazole  Injectable 40 milliGRAM(s) IV Push daily  polyethylene glycol/electrolyte Solution. 4000 milliLiter(s) Oral once  sodium chloride 0.9%. 1000 milliLiter(s) (80 mL/Hr) IV Continuous <Continuous>  sucralfate suspension 1 Gram(s) Oral four times a day    MEDICATIONS  (PRN):  clonazePAM Tablet 0.5 milliGRAM(s) Oral two times a day PRN anxiety  dicyclomine 20 milliGRAM(s) Oral four times a day before meals PRN IBS  ondansetron Injectable 4 milliGRAM(s) IV Push every 6 hours PRN Nausea and/or Vomiting  ondansetron Injectable 4 milliGRAM(s) IV Push once PRN breakthrough nausea    PMH/PSH--  IBS (irritable bowel syndrome)  Anxiety  Cholelithiasis  S/P cholecystectomy  History of bilateral breast reduction surgery  S/P sinus surgery  S/P rhinoplasty      Social History  EtOH: social/rare  Tobacco: denies   Drug Use: denies    Family/Marital History  Paternal grandmother had ileitis, mother has Crohn's disease.      ICU Vital Signs Last 24 Hrs  T(C): 37.1 (25 Jul 2018 14:02), Max: 37.1 (25 Jul 2018 14:02)  T(F): 98.8 (25 Jul 2018 14:02), Max: 98.8 (25 Jul 2018 14:02)  HR: 79 (25 Jul 2018 14:02) (79 - 96)  BP: 116/75 (25 Jul 2018 14:02) (97/68 - 116/75)  BP(mean): --  ABP: --  ABP(mean): --  RR: 14 (25 Jul 2018 14:02) (14 - 16)  SpO2: 97% (25 Jul 2018 14:02) (97% - 99%)      PHYSICAL EXAM:    Constitutional:  anxious, emotional, but calm speech lying in bed.   HEENT: NCAT, anicteric sclera, moist mucous membranes  Respiratory:  CTA b/l, no w/r/r  Cardiovascular:  nl S1, S2, no m/r/g  Gastrointestinal:  Soft, +BS, NT, ND, no hepatosplenomegally  Extremities:  no E/C/C  Neurological:  Alert and orriented x 3.  Skin:  no Jaundice  Lymph Nodes:  no lymphadenopathy in neck, no supraclavicular adenopathy  Psychiatric:  Normal Mood and affect                            10.5   6.33  )-----------( 257      ( 25 Jul 2018 05:52 )             31.4       07-25    142  |  109<H>  |  6<L>  ----------------------------<  104<H>  3.4<L>   |  26  |  0.64    Ca    7.9<L>      25 Jul 2018 05:52    TPro  6.3  /  Alb  3.5  /  TBili  0.4  /  DBili  x   /  AST  292<H>  /  ALT  173<H>  /  AlkPhos  74  07-25          EXAM:  CT ABDOMEN AND PELVIS OC IC                            PROCEDURE DATE:  07/25/2018          INTERPRETATION:  CT ABDOMEN AND PELVIS WITH CONTRAST    INDICATION: Abdominal pain.    TECHNIQUE: Contrast enhanced CT of the abdomen and pelvis.     90 mL of Omnipaque 350 contrast material was injected IV.    COMPARISON: 6/23/2018.    FINDINGS:    Lower Thorax: No consolidation or effusion.        Liver: No suspicious lesions.      Biliary: No dilatation. Cholecystectomy.  Spleen: No suspicious lesions.      Pancreas: No inflammatory changes or ductal dilatation.      Adrenals: Normal.      Kidneys: No hydronephrosis or solid mass. Punctate nonobstructing left   intrarenal calculus.  Vessels: Normal caliber.        GI tract: No evidence of small bowel obstruction. Mild gastric wall   thickening is seen. Peritoneum/retroperitoneum and mesentery: No free   air. No organized fluid collection. No adenopathy.        Pelvic organs/Bladder: No pelvic masses. Bladder is normal.        Abdominal wall: Unremarkable.  Bones and soft tissues: No destructive lesion.        IMPRESSION:    Mild gastric wall thickening, may reflect gastritis in the appropriate   clinical setting. Similarly there is mild descending colonic wall   thickening, which may be due to underdistention or mild inflammation.   Clinical correlation with patient's symptoms is recommended. Endoscopy   can be considered as warranted.    Punctate nonobstructing left intrarenal calculus.

## 2018-07-25 NOTE — PROGRESS NOTE ADULT - SUBJECTIVE AND OBJECTIVE BOX
Patient is a 32y old  Female who presents with a chief complaint of abd pain (2018 09:07)      INTERVAL HPI/OVERNIGHT EVENTS: this am complaining of significant anxiety not relieved with her 0.5mg of klonopin. also c/o persistent abdominal pain and nausea.     MEDICATIONS  (STANDING):  escitalopram 35 milliGRAM(s) Oral daily  pantoprazole  Injectable 40 milliGRAM(s) IV Push daily  sodium chloride 0.9%. 1000 milliLiter(s) (80 mL/Hr) IV Continuous <Continuous>  sucralfate suspension 1 Gram(s) Oral four times a day    MEDICATIONS  (PRN):  clonazePAM Tablet 0.5 milliGRAM(s) Oral two times a day PRN anxiety  dicyclomine 20 milliGRAM(s) Oral four times a day before meals PRN IBS  ondansetron Injectable 4 milliGRAM(s) IV Push every 6 hours PRN Nausea and/or Vomiting  ondansetron Injectable 4 milliGRAM(s) IV Push once PRN breakthrough nausea      Allergies    adhesives (Rash)  No Known Drug Allergies    Intolerances        REVIEW OF SYSTEMS:  CONSTITUTIONAL: denies fever, chills , weakness, fatigue  NECK: denies neck pain/stiffness  RESPIRATORY: denies cough, wheezing, SOB,  hemoptysis   CARDIOVASCULAR: denies chest pain, palpitations, leg swelling  GASTROINTESTINAL: + abdominal pain, +nausea   GENITOURINARY: denies dysuria, frequency, urgency, hematuria  NEUROLOGICAL: denies headaches, dizziness, numbness, tingling  SKIN:   denies new rash or lesions  MUSCULOSKELETAL: denies joint pain or swelling, muscle pain, extremity pain  PSYCHIATRIC: + anxiety   REST OF REVIEW Of SYSTEM - All other ROS negative.    Height (cm): 160.02 ( @ 20:47), 160.02 ( 20:48), 160.02 (:30)  Weight (kg): 80.3 ( 20:47), 81.6 (:48), 82.1 (:30)  BMI (kg/m2): 31.4 ( 20:47), 31.9 ( 20:48), 32.1 (:30)  BSA (m2): 1.84 ( 20:47), 1.85 ( @ 20:48), 1.85 ( @ 01:30)  Vital Signs Last 24 Hrs  T(C): 36.8 (2018 11:03), Max: 36.8 (2018 11:03)  T(F): 98.3 (2018 11:03), Max: 98.3 (2018 11:03)  HR: 92 (2018 11:03) (88 - 96)  BP: 115/79 (2018 11:03) (97/68 - 115/79)  BP(mean): --  RR: 16 (2018 11:03) (16 - 16)  SpO2: 98% (2018 11:03) (98% - 99%)  [ ] room air   [ ] 02    PHYSICAL EXAM:  GENERAL:  No acute distresss , A&O x 3  HEAD:  NCAT, EOMI, PERRLA  ENMT: MMM  NECK:  Supple , no JVD  CHEST/LUNG: CTA B/L , No W/R/R  HEART:  Regular rate and rhythm, No M/R/G  ABDOMEN:  soft, nontender, nondistended, positive bowel sounds   EXTREMITIES: No clubbing, cyanosis or edema  PSYCH: Anxious appearing    LABS:                        10.5   6.33  )-----------( 257      ( 2018 05:52 )             31.4     2018 05:52    142    |  109    |  6      ----------------------------<  104    3.4     |  26     |  0.64     Ca    7.9        2018 05:52    TPro  6.3    /  Alb  3.5    /  TBili  0.4    /  DBili  x      /  AST  292    /  ALT  173    /  AlkPhos  74     2018 05:52        Urinalysis Basic - ( 2018 09:00 )    Color: Pale Yellow / Appearance: Clear / S.005 / pH: x  Gluc: x / Ketone: Negative  / Bili: Negative / Urobili: Negative   Blood: x / Protein: Negative / Nitrite: Negative   Leuk Esterase: Negative / RBC: x / WBC x   Sq Epi: x / Non Sq Epi: x / Bacteria: x      CAPILLARY BLOOD GLUCOSE        Cultures

## 2018-07-25 NOTE — H&P ADULT - PROBLEM SELECTOR PLAN 5
IMPROVE VTE Individual Risk Assessment          RISK                                                          Points    [  ] Previous VTE                                                3  [  ] Thrombophilia                                             2  [  ] Lower limb paralysis                                   2        (unable to hold up >15 seconds)    [  ] Current Cancer                                            2         (within 6 months)  [  ] Immobilization > 24 hrs                              1  [  ] ICU/CCU stay > 24 hours                            1  [  ] Age > 60                                                    1    IMPROVE VTE Score 0    [place] 0    low risk. SCDs

## 2018-07-25 NOTE — H&P ADULT - PROBLEM SELECTOR PLAN 1
-patient may meet LUCA criteria with recurrent abdominal pain at least once per day and associated with a change in stool frequency and appearance  - previous negative outpatient work-up with negative CT, colonoscopy (Nov 2017), and endoscopy  - mild gastric and colonic wall thickening on CT new from prior imaging  - consult GI, appreciate recs (Lianne)  - diet NPO, bowel rest  - protonix   - maintenance fluids NS 60 ml  - procal 0.06; procal > 1 indicates possible infection  - hold abx, subacute presentation and neg procal favors inflammatory response over overt infection -patient may meet LUCA criteria with recurrent abdominal pain at least once per day and associated with a change in stool frequency and appearance  - previous negative outpatient work-up with negative CT, colonoscopy (Nov 2017), and endoscopy  - mild gastric and colonic wall thickening on CT new from prior imaging  - consult GI, appreciate recs (Lianne)  - diet NPO, bowel rest  - protonix 40 mg   - maintenance fluids NS 80 ml   - procal 0.06; procal > 1 indicates possible infection  - hold abx, subacute presentation and neg procal favors inflammatory response over overt infection -patient may meet LUCA criteria with recurrent abdominal pain at least once per day and associated with a change in stool frequency and appearance  - previous negative outpatient work-up with negative CT, colonoscopy (Nov 2017), and endoscopy  - mild gastric and colonic wall thickening on CT new from prior imaging  - consult GI, appreciate recs (Lianne)  - diet NPO, bowel rest  - protonix 40 mg   - maintenance fluids NS 80 ml

## 2018-07-25 NOTE — H&P ADULT - PROBLEM SELECTOR PLAN 4
IMPROVE VTE Individual Risk Assessment          RISK                                                          Points    [  ] Previous VTE                                                3  [  ] Thrombophilia                                             2  [  ] Lower limb paralysis                                   2        (unable to hold up >15 seconds)    [  ] Current Cancer                                            2         (within 6 months)  [  ] Immobilization > 24 hrs                              1  [  ] ICU/CCU stay > 24 hours                            1  [  ] Age > 60                                                    1    IMPROVE VTE Score 0    Pad[place] 0    low risk. SCDs - continue Klonipin and Lexapro

## 2018-07-25 NOTE — CONSULT NOTE ADULT - PROBLEM SELECTOR RECOMMENDATION 3
Recent elevated LFTs thought due to med toxicity - blood test for elevated LFTs done in office negative except for positive titer to anti-smooth muscle antibody.  Continue monitoring LFTs.  Avoid hepatotoxic meds  IV hydration.

## 2018-07-25 NOTE — H&P ADULT - HISTORY OF PRESENT ILLNESS
33 yo F PMHx of anxiety ( on klonopin and lexapro), cholelithiasis s/p cholecystitis, nephrolithiasis, and GERD presents with "on and off" watery nonbloody diarrhea for the past few weeks prior to presentation, associated with epigastric abdominal pain and nausea.     Patient complained of watery nonbloody diarrhea and noted associated alternating pain, sometimes decreased after defecation. Patient described pain and epigastric, band like, not made better or worse by anything, sudden onset beginning a few weeks ago. Patient also complained of nausea, without vomiting.     Of note, patient endorsed change in frequency of bowel movements in past few weeks; sometimes she would have multiple BMs consisting of watery nonbloody diarrhea, and some days no BMs at all. She also noted change in stool appearance (somtimes solid, sometimes watery).     Patient denied vomiting, fever, diaphoresis, chills, sick contacts, recent travel, recent antibiotics use, any hx of bloody diarrhea.     In the ED, patient's vitals T 97, HR 88, BP 97/68, RR 16 and Sp02 99% ORA. Labs notable for AST 92. She received 3 L NS, dilaudid 1mg, dicylomine 10 mg, famotidine 20 mg, pantoprazole 10 mg, prochloperazine 10 mg. CT revealed mild gastric wall thickening and mild descending colonic wall thickening.     Of note, patient has had extensive outpatient f/u for similar presentations of abdominal pain, nausea, change in frequency of BMs and change in stool appearance with Dr. Abdalla including endoscopy, colonoscopy and CT imaging, all negative for IBD. 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     Patient complained of watery nonbloody diarrhea and noted associated alternating pain, sometimes decreased after defecation. Patient described pain and epigastric, band like, not made better or worse by anything, sudden onset beginning a few weeks ago. Patient also complained of nausea, without vomiting.     Of note, patient endorsed change in frequency of bowel movements in past few weeks; sometimes she would have multiple BMs consisting of watery nonbloody diarrhea, and some days no BMs at all. She also noted change in stool appearance (somtimes solid, sometimes watery).     Patient denied vomiting, fever, diaphoresis, chills, sick contacts, recent travel, recent antibiotics use, any hx of bloody diarrhea.     In the ED, patient's vitals T 97, HR 88, BP 97/68, RR 16 and Sp02 99% ORA. Labs notable for AST 92. She received 3 L NS, dilaudid 1mg, dicylomine 10 mg, famotidine 20 mg, pantoprazole 10 mg, prochloperazine 10 mg. CT revealed mild gastric wall thickening and mild descending colonic wall thickening.     Of note, patient has had extensive outpatient f/u for similar presentations of abdominal pain, nausea, change in frequency of BMs and change in stool appearance with Dr. Abdalla including endoscopy, colonoscopy and CT imaging, all negative for IBD. 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

## 2018-07-25 NOTE — H&P ADULT - NSHPPHYSICALEXAM_GEN_ALL_CORE
Physical Exam:  General: lying in bed, crying,e xtremely anxious, shift positions, exclaimed "I feel like I need to move and get out of here" in context of explaining her anxiety sx to writer  HEENT: NCAT, EOMI bl, moist mucous membranes   Neck: Supple, nontender, no mass  Neurology: A&Ox3,  sensation intact  Respiratory: CTA B/L, No W/R/R  CV: RRR, +S1/S2, no murmurs, rubs or gallops  Abdominal: tender to palpation in LUQ and RUQ, + bowel sounds, no rebound, no guarding   Extremities: No C/C/E, + peripheral pulses  MSK: Normal ROM, no joint erythema or warmth, no joint swelling   Skin: warm, dry, normal color, no rash or abnormal lesions

## 2018-07-25 NOTE — CONSULT NOTE ADULT - ASSESSMENT
Chronic diarrhea/abdominal pain  Family raises question of bacterial overgrowth syndrome- not clear why she would be at risk, and symptoms persisted despite Rx for strep throat.  Main concern on my part is IBD, though no fevers, blood in stool, and not especially anemic presently. Has several potential extraintestinal manifestations (kidney stones, aphthous ulcers).  No known amyloid  LFT not readily explained. States + titers in past type unknown. States viral hepatitis tests negative. No PSC evident radiographically.  Sarcoid a difficult dx to prove without Bx (and prior EGD/col with neg bx per patient)  Abdominal pain/diarrhea can be seen with Behçet's, but IBD far more common. No eye findings,  complaints, nor bloody diarrhea.  No amyloidosis on Bx's per patient  No known immune compromise  No known vasculitis  No arthritis or rash to invoke SLE.  Bacterial pathogens highly unlikely  Parasitic processes should be exckuded  Imaging and exam not especially impressive    Toxoplasmosis in immune competent host would not explain years of diarrhea and abdominal pain. Moreover, igM should not be positive, IgG should be.    Suggestions--  EGD/Col with Bx's  ?Microscopic colitis  ?Collagenous/Allergic colitis  ASCA  ESR LONNY RF ANCA CRP  HIV (though no apparent risks)  AMA, anti smooth muscle Ab  Stool cx, low yield  Stool O&P  Crypto/Iso/Microsporidia (though again not clear what would place her at risk)  Defer antibiotics  Recheck Toxo serology to see if there is seroconversion (perhaps explains sore throat?)    Discussed with patient/family in detail.    Thank you for the courtesy of this referral.    Salinas Sandoval MD  [phone]

## 2018-07-25 NOTE — H&P ADULT - ASSESSMENT
33 yo F PMHx of anxiety ( on klonopin and lexapro), cholelithiasis s/p cholecystitis, nephrolithiasis, and GERD presents with "on and off" watery nonbloody diarrhea,  associated with epigastric abdominal pain and nausea, change in stool frequency and change in stool appearance (sometimes solid, sometimes watery), found to be afebrile, with CT revealing mild gastric wall and colonic thickening, c/w IBS vs IBD. 33 yo F PMHx of anxiety ( on klonopin and lexapro), cholelithiasis s/p cholecystectomy, hx of nephrolithiasis, and GERD presents with "on and off" watery nonbloody diarrhea,  associated with epigastric abdominal pain and nausea, change in stool frequency and change in stool appearance (sometimes solid, sometimes watery), found to be afebrile, with CT revealing mild gastric wall and colonic thickening, c/w IBS vs IBD.

## 2018-07-26 ENCOUNTER — RESULT REVIEW (OUTPATIENT)
Age: 33
End: 2018-07-26

## 2018-07-26 ENCOUNTER — TRANSCRIPTION ENCOUNTER (OUTPATIENT)
Age: 33
End: 2018-07-26

## 2018-07-26 LAB
CK MB BLD-MCNC: <1.2 % — SIGNIFICANT CHANGE UP (ref 0–3.5)
CK MB CFR SERPL CALC: <1 NG/ML — SIGNIFICANT CHANGE UP (ref 0–3.6)
CK SERPL-CCNC: 83 U/L — SIGNIFICANT CHANGE UP (ref 26–192)
CRP SERPL-MCNC: 0.44 MG/DL — HIGH (ref 0–0.4)
CULTURE RESULTS: SIGNIFICANT CHANGE UP
ERYTHROCYTE [SEDIMENTATION RATE] IN BLOOD: 10 MM/HR — SIGNIFICANT CHANGE UP (ref 0–15)
HIV 1+2 AB+HIV1 P24 AG SERPL QL IA: SIGNIFICANT CHANGE UP
RHEUMATOID FACT SERPL-ACNC: <10 IU/ML — SIGNIFICANT CHANGE UP (ref 0–13)
SPECIMEN SOURCE: SIGNIFICANT CHANGE UP
T GONDII IGG SER QL: <3 IU/ML — SIGNIFICANT CHANGE UP
T GONDII IGG SER QL: NEGATIVE — SIGNIFICANT CHANGE UP
T GONDII IGM SER QL: 13.6 AU/ML — HIGH
T GONDII IGM SER QL: POSITIVE
TROPONIN I SERPL-MCNC: <.015 NG/ML — SIGNIFICANT CHANGE UP (ref 0.01–0.04)

## 2018-07-26 PROCEDURE — 99222 1ST HOSP IP/OBS MODERATE 55: CPT

## 2018-07-26 PROCEDURE — 43239 EGD BIOPSY SINGLE/MULTIPLE: CPT

## 2018-07-26 PROCEDURE — 90792 PSYCH DIAG EVAL W/MED SRVCS: CPT

## 2018-07-26 PROCEDURE — 88312 SPECIAL STAINS GROUP 1: CPT | Mod: 26

## 2018-07-26 PROCEDURE — 99233 SBSQ HOSP IP/OBS HIGH 50: CPT | Mod: GC

## 2018-07-26 PROCEDURE — 88305 TISSUE EXAM BY PATHOLOGIST: CPT | Mod: 26

## 2018-07-26 PROCEDURE — 93010 ELECTROCARDIOGRAM REPORT: CPT

## 2018-07-26 PROCEDURE — 45378 DIAGNOSTIC COLONOSCOPY: CPT

## 2018-07-26 PROCEDURE — 88313 SPECIAL STAINS GROUP 2: CPT | Mod: 26

## 2018-07-26 RX ORDER — CLONAZEPAM 1 MG
1 TABLET ORAL AT BEDTIME
Qty: 0 | Refills: 0 | Status: DISCONTINUED | OUTPATIENT
Start: 2018-07-26 | End: 2018-07-27

## 2018-07-26 RX ORDER — POTASSIUM CHLORIDE 20 MEQ
40 PACKET (EA) ORAL ONCE
Qty: 0 | Refills: 0 | Status: COMPLETED | OUTPATIENT
Start: 2018-07-26 | End: 2018-07-26

## 2018-07-26 RX ADMIN — PANTOPRAZOLE SODIUM 40 MILLIGRAM(S): 20 TABLET, DELAYED RELEASE ORAL at 18:01

## 2018-07-26 RX ADMIN — ESCITALOPRAM OXALATE 35 MILLIGRAM(S): 10 TABLET, FILM COATED ORAL at 18:01

## 2018-07-26 RX ADMIN — Medication 1 MILLIGRAM(S): at 21:09

## 2018-07-26 RX ADMIN — Medication 20 MILLIGRAM(S): at 18:00

## 2018-07-26 RX ADMIN — Medication 40 MILLIEQUIVALENT(S): at 18:02

## 2018-07-26 RX ADMIN — Medication 1 GRAM(S): at 23:24

## 2018-07-26 RX ADMIN — Medication 10 MILLIGRAM(S): at 21:09

## 2018-07-26 RX ADMIN — Medication 1 GRAM(S): at 18:01

## 2018-07-26 NOTE — BEHAVIORAL HEALTH ASSESSMENT NOTE - SUMMARY
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

## 2018-07-26 NOTE — DISCHARGE NOTE ADULT - PATIENT PORTAL LINK FT
You can access the [facility] Patient Portal, offered by [facility], by registering with the following website: [email or web address]

## 2018-07-26 NOTE — DISCHARGE NOTE ADULT - HOSPITAL COURSE
This is a 32 year old female with a PMHx of anxiety ( on klonopin and lexapro), cholelithiasis s/p cholecystectomy, hx of nephrolithiasis, and GERD that presented with "on and off" watery nonbloody diarrhea,  associated with epigastric abdominal pain and nausea, change in stool frequency and change in stool appearance (sometimes solid, sometimes watery) In the ED, patient was  found to be afebrile, with CT revealing mild gastric wall and colonic thickening, consistent with IBS vs IBD. In the ED labs were notable for elevated liver enzymes (which normalized throughout hospital course). An EGD and colonoscopy were done, that were negative.  Infectious disease consult followed the patient, patient tested positive for Toxoplasmosis IgM, which did not need to be treated because patient is not immunocompromised, and because IgG is negative, unlikely cause  of patient's symptoms. During the hospital stay, an EKG showed T wave inversions but repeat EKG only had nonspecific changes, cardiology consult followed patient throughout hospital stay and cardiac enzymes negative, patient recommended to have outpatient echocardiogram.  Stool Cryptospardium parvum antigen was negative, awaiting stool ova and parasites, can be followed by outpatient PMD.    Patient is stable for discharge to home.         Vital Signs Last 24 Hrs  T(C): 36.9 (27 Jul 2018 07:54), Max: 37.6 (26 Jul 2018 20:38)  T(F): 98.4 (27 Jul 2018 07:54), Max: 99.6 (26 Jul 2018 20:38)  HR: 69 (27 Jul 2018 07:54) (62 - 80)  BP: 104/71 (27 Jul 2018 07:54) (103/71 - 120/75)  BP(mean): --  RR: 18 (27 Jul 2018 07:54) (14 - 18)  SpO2: 99% (27 Jul 2018 07:54) (96% - 100%)    GENERAL: NAD, well-groomed, well-developed  HEAD:  Atraumatic, Normocephalic  NERVOUS SYSTEM:  Alert & Oriented X3, Good concentration, nonfocal   CHEST/LUNG: Clear to percussion bilaterally; No rales, rhonchi, wheezing, or rubs  HEART: Regular rate and rhythm; No murmurs, rubs, or gallops  ABDOMEN: Soft, mild tenderness, Nondistended; Bowel sounds present  EXTREMITIES: No clubbing, cyanosis, or edema  SKIN: warm, dry This is a 32 year old female with a PMHx of anxiety ( on klonopin and lexapro), cholelithiasis s/p cholecystectomy, hx of nephrolithiasis, and GERD that presented with "on and off" watery nonbloody diarrhea,  associated with epigastric abdominal pain and nausea, change in stool frequency and change in stool appearance (sometimes solid, sometimes watery) In the ED, patient was  found to be afebrile, with CT revealing mild gastric wall and colonic thickening, consistent with IBS vs IBD. In the ED labs were notable for elevated liver enzymes (which normalized throughout hospital course). admitted with  IBS (irritable bowel syndrome).  Ppatient may meet LUCA criteria with recurrent abdominal pain at least once per day and associated with a change in stool frequency and appearance  - previous negative outpatient work-up with negative CT, colonoscopy (Nov 2017), and endoscopy  - mild gastric and colonic wall thickening on CT new from prior imaging  - consult GI, appreciate recs (Lianne) again had    EGD and colonoscopy were done, that were negative.  Infectious disease consult followed the patient, patient tested positive for Toxoplasmosis IgM, which did not need to be treated because patient is not immunocompromised, and because IgG is negative, unlikely cause  of patient's symptoms. During the hospital stay, an EKG showed T wave inversions but repeat EKG only had nonspecific changes, cardiology consult followed patient throughout hospital stay and cardiac enzymes negative, patient recommended to have outpatient echocardiogram.  Stool Cryptospardium parvum antigen was negative, awaiting stool ova and parasites, can be followed by outpatient PMD.  physical at day of dc   Patient is stable for discharge to home.         Vital Signs Last 24 Hrs  T(C): 36.9 (27 Jul 2018 07:54), Max: 37.6 (26 Jul 2018 20:38)  T(F): 98.4 (27 Jul 2018 07:54), Max: 99.6 (26 Jul 2018 20:38)  HR: 69 (27 Jul 2018 07:54) (62 - 80)  BP: 104/71 (27 Jul 2018 07:54) (103/71 - 120/75)  BP(mean): --  RR: 18 (27 Jul 2018 07:54) (14 - 18)  SpO2: 99% (27 Jul 2018 07:54) (96% - 100%)    GENERAL: NAD, well-groomed, well-developed  HEAD:  Atraumatic, Normocephalic  NERVOUS SYSTEM:  Alert & Oriented X3, Good concentration, nonfocal   CHEST/LUNG: Clear to percussion bilaterally; No rales, rhonchi, wheezing,  HEART: Regular rate and rhythm; No murmurs,  no tachy   ABDOMEN: Soft, mild tenderness, Nondistended; Bowel sounds present  EXTREMITIES: No clubbing, cyanosis, or edema  SKIN: warm, dry     fu dr leigh out pt pcp with in 1wk . fu  out pt echo , fu gi dr gamez  with in 1wk   .

## 2018-07-26 NOTE — DISCHARGE NOTE ADULT - CARE PLAN
Principal Discharge DX:	IBS (irritable bowel syndrome)  Assessment and plan of treatment:	Continue home medication of Carafate and Dicyclomine.    F/u with GI outpatient.  Awaiting stool feces results, PMD please follow-up.  Secondary Diagnosis:	Anxiety  Assessment and plan of treatment:	Continue home medications of Lexapro and Klonapin.   F/u with PCP.  Secondary Diagnosis:	GERD (gastroesophageal reflux disease)  Assessment and plan of treatment:	Continue home medication of carafate.    F/u with GI.  Secondary Diagnosis:	EKG abnormality  Assessment and plan of treatment:	Repeat EKG, normal with nonspecific changes   Recommend outpatient echocardiogram Principal Discharge DX:	IBS (irritable bowel syndrome)  Goal:	symptom resolved  Assessment and plan of treatment:	Continue home medication of Carafate and Dicyclomine.    F/u with GI outpatient.  Awaiting stool feces results, PMD please follow-up.  Secondary Diagnosis:	Anxiety  Goal:	stable  Assessment and plan of treatment:	Continue home medications of Lexapro and Klonapin.   F/u with PCP.  Secondary Diagnosis:	GERD (gastroesophageal reflux disease)  Goal:	stable / post endo gastritis  Assessment and plan of treatment:	Continue home medication of carafate.    F/u with GI.  Secondary Diagnosis:	EKG abnormality  Assessment and plan of treatment:	Repeat EKG, normal with nonspecific changes   Recommend outpatient echocardiogram

## 2018-07-26 NOTE — CHART NOTE - NSCHARTNOTEFT_GEN_A_CORE
Procedure: EGD/colonoscopy to Terminal ileum.  Indication: Nausea, abdominal pain, diarrhea, CT showing mild wall thickening of stomach and descending colon, family hx of IBD  MD:  Dr. Abdalla  Anesthesia:  MAC      Findings:   1.  1 cm hiatal hernia  2.  Mild antral erythema s/p bx  3.  Normal duodenum s/p bx  4.  Normal terminal ileum  5.  Normal colon  6.  Mild internal hemorrhoids.     Impression:  Admitting symptoms from irritable bowel syndrome exacerbated by anxiety, no gross evidence of inflammatory bowel disease  1.  Follow up pathology  2.  Start previous diet and advance as tolerated.   3.  Follow up in GI office.

## 2018-07-26 NOTE — PROGRESS NOTE ADULT - SUBJECTIVE AND OBJECTIVE BOX
Patient is a 32y old  Female who presents with a chief complaint of abd pain (2018 11:29)      HPI:  31 yo F PMHx of anxiety ( on klonopin and lexapro), cholelithiasis s/p cholecystectomy, hx of nephrolithiasis, and GERD presents with "on and off" watery nonbloody diarrhea for the past few weeks prior to presentation, associated with epigastric abdominal pain and nausea.     Patient complained of watery nonbloody diarrhea and noted associated alternating pain, sometimes decreased after defecation. Patient described pain and epigastric, band like, not made better or worse by anything, sudden onset beginning a few weeks ago. Patient also complained of nausea, without vomiting.      admitted abd pain sec to ibs vs gastritis with new ekg change - pt seen and examine today state mild abd pain , npo ,  no nausea / vomiting  , no fever , no distress , no cp.     INTERVAL HPI/OVERNIGHT EVENTS:  T(C): 36.9 (18 @ 11:04), Max: 37.3 (18 @ 08:29)  HR: 78 (18 @ 11:04) (76 - 87)  BP: 111/73 (18 @ 11:04) (100/66 - 118/82)  RR: 16 (18 @ 11:04) (14 - 16)  SpO2: 98% (18 @ 11:04) (97% - 100%)  Wt(kg): --  I&O's Summary    2018 07:01  -  2018 07:00  --------------------------------------------------------  IN: 1360 mL / OUT: 0 mL / NET: 1360 mL        REVIEW OF SYSTEMS:  CONSTITUTIONAL: No fever, weight loss, or fatigue  EYES: No eye pain, visual disturbances, or discharge  ENMT:  No sinus or throat pain  NECK: No pain or stiffness  BREASTS: No pain, no masses,   RESPIRATORY: No cough, wheezing, No shortness of breath  CARDIOVASCULAR: No chest pain, palpitations, dizziness, or leg swelling  GASTROINTESTINAL: No abdominal or epigastric pain. No nausea, vomiting, or hematemesis; No diarrhea or constipation.  GENITOURINARY: No dysuria, frequency, hematuria, or incontinence  NEUROLOGICAL: No headaches, memory loss, loss of strength, numbness,   SKIN: No itching, burning, rashes, or lesions   MUSCULOSKELETAL: No joint pain or swelling; No muscle, back, or extremity pain      PHYSICAL EXAM:  GENERAL: NAD, well-groomed, well-developed  HEAD:  Atraumatic, Normocephalic  EYES: EOMI, PERRLA, conjunctiva and sclera clear  ENMT:  Moist mucous membranes, No lesions  NECK: Supple,   NERVOUS SYSTEM:  Alert & Oriented X3, Good concentration; Motor Strength 5/5 B/L upper and lower extremities; DTRs 2+ intact and symmetric  CHEST/LUNG: Clear to percussion bilaterally; No rales, rhonchi, wheezing,   HEART: Regular rate and rhythm; No murmurs, no tachy   ABDOMEN: Soft,  mild tender, Nondistended; Bowel sounds present  EXTREMITIES:  2+ Peripheral Pulses, No clubbing, cyanosis, or edema    SKIN: No rashes or lesions    MEDICATIONS  (STANDING):  escitalopram 35 milliGRAM(s) Oral daily  pantoprazole  Injectable 40 milliGRAM(s) IV Push daily  sodium chloride 0.9%. 1000 milliLiter(s) (80 mL/Hr) IV Continuous <Continuous>  sucralfate suspension 1 Gram(s) Oral four times a day    MEDICATIONS  (PRN):  clonazePAM Tablet 0.5 milliGRAM(s) Oral two times a day PRN anxiety  dicyclomine 20 milliGRAM(s) Oral four times a day before meals PRN IBS  ondansetron Injectable 4 milliGRAM(s) IV Push every 6 hours PRN Nausea and/or Vomiting      LABS:                        10.5   6.33  )-----------( 257      ( 2018 05:52 )             31.4     07-25    142  |  109<H>  |  6<L>  ----------------------------<  104<H>  3.4<L>   |  26  |  0.64    Ca    7.9<L>      2018 05:52    TPro  6.3  /  Alb  3.5  /  TBili  0.4  /  DBili  x   /  AST  292<H>  /  ALT  173<H>  /  AlkPhos  74  -      Urinalysis Basic - ( 2018 09:00 )    Color: Pale Yellow / Appearance: Clear / S.005 / pH: x  Gluc: x / Ketone: Negative  / Bili: Negative / Urobili: Negative   Blood: x / Protein: Negative / Nitrite: Negative   Leuk Esterase: Negative / RBC: x / WBC x   Sq Epi: x / Non Sq Epi: x / Bacteria: x      CAPILLARY BLOOD GLUCOSE          - @ 11:49   50,000 - 99,000 CFU/mL Enterococcus faecalis  --  --          RADIOLOGY & ADDITIONAL TESTS:    Imaging Personally Reviewed:   no new test   Mild gastric wall thickening, may reflect gastritis in the appropriate   clinical setting. Similarly there is mild descending colonic wall   thickening, which may be due to underdistention or mild inflammation.   Clinical correlation with patient's symptoms is recommended. Endoscopy   can be considered as warranted.    Punctate nonobstructing left intrarenal calculus.    Advance Directives:  full code

## 2018-07-26 NOTE — PROGRESS NOTE ADULT - ASSESSMENT
Chronic diarrhea/abdominal pain  Main concern on my part is IBD, though no fevers, blood in stool, and not especially anemic presently. Has several potential extraintestinal manifestations (kidney stones, aphthous ulcers).

## 2018-07-26 NOTE — DISCHARGE NOTE ADULT - MEDICATION SUMMARY - MEDICATIONS TO TAKE
I will START or STAY ON the medications listed below when I get home from the hospital:    KlonoPIN 0.5 mg oral tablet  --  by mouth , As Needed  -- Indication: For Anxiety    Lexapro  -- 35 milligram(s) by mouth once a day  -- Indication: For Anxiety    Zofran ODT 4 mg oral tablet, disintegrating  -- 1 tab(s) by mouth 3 times a day, As Needed  -- Indication: For nausea    dicyclomine 10 mg oral capsule  -- 2 cap(s) by mouth 4 times a day, As Needed  -- Indication: For IBS (irritable bowel syndrome)    Carafate 1 g/10 mL oral suspension  -- 10 milliliter(s) by mouth 4 times a day (before meals and at bedtime), As Needed   -- Do not take dairy products, antacids, or iron preparations within one hour of this medication.  It is very important that you take or use this exactly as directed.  Do not skip doses or discontinue unless directed by your doctor.  Shake well before use.  Take medication on an empty stomach 1 hour before or 2 to 3 hours after a meal unless otherwise directed by your doctor.    -- Indication: For IBS (irritable bowel syndrome)

## 2018-07-26 NOTE — CONSULT NOTE ADULT - SUBJECTIVE AND OBJECTIVE BOX
[facility]         Yeny Valverde, Zuleyka, Luis, Elizabeth, Zack, Tony        [phone] (office)    CHIEF COMPLAINT: Patient is a 32y old  Female who presents with a chief complaint of abd pain (26 Jul 2018 11:29)      HPI:  31 yo F PMHx of anxiety ( on klonopin and lexapro), cholelithiasis s/p cholecystectomy, hx of nephrolithiasis, and GERD presents with "on and off" watery nonbloody diarrhea for the past few weeks prior to presentation, associated with epigastric abdominal pain and nausea.     Patient complained of watery nonbloody diarrhea and noted associated alternating pain, sometimes decreased after defecation. Patient described pain and epigastric, band like, not made better or worse by anything, sudden onset beginning a few weeks ago. Patient also complained of nausea, without vomiting.     Of note, patient endorsed change in frequency of bowel movements in past few weeks; sometimes she would have multiple BMs consisting of watery nonbloody diarrhea, and some days no BMs at all. She also noted change in stool appearance (somtimes solid, sometimes watery).     Patient denies CP, SOB, palpitations, vomiting, fever, diaphoresis, chills, sick contacts, recent travel, recent antibiotics use, any hx of bloody diarrhea.     In the ED, patient's vitals T 97, HR 88, BP 97/68, RR 16 and Sp02 99% ORA. Labs notable for AST 92. She received 3 L NS, dilaudid 1mg, dicylomine 10 mg, famotidine 20 mg, pantoprazole 10 mg, prochloperazine 10 mg. CT revealed mild gastric wall thickening and mild descending colonic wall thickening.     Of note, patient has had extensive outpatient f/u for similar presentations of abdominal pain, nausea, change in frequency of BMs and change in stool appearance with Dr. Abdalla including endoscopy, colonoscopy and CT imaging, all negative for IBD. 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 (though patient does not remember specific diagnosis) (25 Jul 2018 02:42)      PAST MEDICAL & SURGICAL HISTORY:  IBS (irritable bowel syndrome)  Anxiety  Cholelithiasis  S/P cholecystectomy  History of bilateral breast reduction surgery  S/P sinus surgery  S/P rhinoplasty      SOCIAL HISTORY: No active tobacco, alcohol or illicit drug use    FAMILY HISTORY:  No pertinent family history in first degree relatives   No pertinent family history of CAD    Outpatient medications:  · 	Carafate 1 g/10 mL oral suspension: Last Dose Taken:  , 10 milliliter(s) orally 4 times a day (before meals and at bedtime), As Needed   · 	Lexapro 20 mg oral tablet: Last Dose Taken:  , 1 tab(s) orally once a day  · 	KlonoPIN 0.5 mg oral tablet: Last Dose Taken:  ,  orally , As Needed  · 	dicyclomine 10 mg oral capsule: Last Dose Taken:  , 2 cap(s) orally 4 times a day, As Needed  · 	Zofran ODT 4 mg oral tablet, disintegrating: Last Dose Taken:  , 1 tab(s) orally 3 times a day, As Needed  · 	belladonna: Last Dose Taken:      MEDICATIONS  (STANDING):  clonazePAM Tablet 1 milliGRAM(s) Oral at bedtime  escitalopram 35 milliGRAM(s) Oral daily  pantoprazole  Injectable 40 milliGRAM(s) IV Push daily  potassium chloride    Tablet ER 40 milliEquivalent(s) Oral once  sodium chloride 0.9%. 1000 milliLiter(s) (80 mL/Hr) IV Continuous <Continuous>  sucralfate suspension 1 Gram(s) Oral four times a day    MEDICATIONS  (PRN):  clonazePAM Tablet 0.5 milliGRAM(s) Oral two times a day PRN anxiety  dicyclomine 20 milliGRAM(s) Oral four times a day before meals PRN IBS  ondansetron Injectable 4 milliGRAM(s) IV Push every 6 hours PRN Nausea and/or Vomiting      Allergies    adhesives (Rash)  No Known Drug Allergies    Intolerances        REVIEW OF SYSTEMS: Is negative for eye, ENT, GI, , allergic, dermatologic, musculoskeletal and neurologic, except as described above.    VITAL SIGNS:   Vital Signs Last 24 Hrs  T(C): 36.9 (26 Jul 2018 13:32), Max: 37.3 (26 Jul 2018 08:29)  T(F): 98.4 (26 Jul 2018 13:32), Max: 99.2 (26 Jul 2018 08:29)  HR: 62 (26 Jul 2018 13:32) (62 - 87)  BP: 107/73 (26 Jul 2018 13:32) (100/66 - 120/75)  BP(mean): --  RR: 14 (26 Jul 2018 13:00) (14 - 16)  SpO2: 100% (26 Jul 2018 13:32) (98% - 100%)    I&O's Summary    25 Jul 2018 07:01  -  26 Jul 2018 07:00  --------------------------------------------------------  IN: 1360 mL / OUT: 0 mL / NET: 1360 mL        PHYSICAL EXAM:  GENERAL: NAD, well-groomed, well-developed  HEAD:  Atraumatic, Normocephalic  EYES: EOMI, PERRLA, conjunctiva and sclera clear  ENMT:  Moist mucous membranes, No lesions  NECK: Supple,   NERVOUS SYSTEM:  Alert & Oriented X3, Good concentration; Motor Strength 5/5 B/L upper and lower extremities; DTRs 2+ intact and symmetric  CHEST/LUNG: Clear to percussion bilaterally; No rales, rhonchi, wheezing,   HEART: Regular rate and rhythm; No murmurs, no tachy   ABDOMEN: Soft,  mild tender, Nondistended; Bowel sounds present  EXTREMITIES:  2+ Peripheral Pulses, No clubbing, cyanosis, or edema    SKIN: No rashes or lesions    LABS: All Labs Reviewed:                        10.5   6.33  )-----------( 257      ( 25 Jul 2018 05:52 )             31.4                         11.9   9.93  )-----------( 267      ( 25 Jul 2018 00:05 )             35.1     25 Jul 2018 05:52    142    |  109    |  6      ----------------------------<  104    3.4     |  26     |  0.64   24 Jul 2018 22:26    140    |  108    |  11     ----------------------------<  87     3.5     |  23     |  0.57     Ca    7.9        25 Jul 2018 05:52  Ca    8.8        24 Jul 2018 22:26    TPro  6.3    /  Alb  3.5    /  TBili  0.4    /  DBili  x      /  AST  292    /  ALT  173    /  AlkPhos  74     25 Jul 2018 05:52  TPro  7.4    /  Alb  4.1    /  TBili  0.4    /  DBili  x      /  AST  92     /  ALT  57     /  AlkPhos  70     24 Jul 2018 22:26          Blood Culture: Organism --  Gram Stain Blood -- Gram Stain --  Specimen Source .Urine Clean Catch (Midstream)  Culture-Blood --            RADIOLOGY: < from: CT Abdomen and Pelvis w/ Oral Cont and w/ IV Cont (07.25.18 @ 00:59) >  EXAM:  CT ABDOMEN AND PELVIS OC IC                            PROCEDURE DATE:  07/25/2018          INTERPRETATION:  CT ABDOMEN AND PELVIS WITH CONTRAST    INDICATION: Abdominal pain.    TECHNIQUE: Contrast enhanced CT of the abdomen and pelvis.     90 mL of Omnipaque 350 contrast material was injected IV.    COMPARISON: 6/23/2018.    FINDINGS:    Lower Thorax: No consolidation or effusion.        Liver: No suspicious lesions.      Biliary: No dilatation. Cholecystectomy.  Spleen: No suspicious lesions.      Pancreas: No inflammatory changes or ductal dilatation.      Adrenals: Normal.      Kidneys: No hydronephrosis or solid mass. Punctate nonobstructing left   intrarenal calculus.  Vessels: Normal caliber.        GI tract: No evidence of small bowel obstruction. Mild gastric wall   thickening is seen. Peritoneum/retroperitoneum and mesentery: No free   air. No organized fluid collection. No adenopathy.        Pelvic organs/Bladder: No pelvic masses. Bladder is normal.        Abdominal wall: Unremarkable.  Bones and soft tissues: No destructive lesion.        IMPRESSION:    Mild gastric wall thickening, may reflect gastritis in the appropriate   clinical setting. Similarly there is mild descending colonic wall   thickening, which may be due to underdistention or mild inflammation.   Clinical correlation with patient's symptoms is recommended. Endoscopy   can be considered as warranted.    Punctate nonobstructing left intrarenal calculus.                SAMANTHA MCDONALD M.D., ATTENDING RADIOLOGIST  This document has been electronically signed. Jul 25 2018  1:31AM    < end of copied text >      EKG: NSR, t wave inversion in V1-V3, III [facility]         Yeny Valverde, Zuleyka, Luis, Elizabeth, Zack, Tony        [phone] (office)    CHIEF COMPLAINT: Patient is a 32y old  Female who presents with a chief complaint of abd pain  and abn ekg      HPI:  33 yo F PMHx of anxiety ( on klonopin and lexapro), cholelithiasis s/p cholecystectomy, hx of nephrolithiasis, and GERD presents with "on and off" watery nonbloody diarrhea for the past few weeks prior to presentation, associated with epigastric abdominal pain and nausea.     Patient complained of watery nonbloody diarrhea and noted associated alternating pain, sometimes decreased after defecation. Patient described pain and epigastric, band like, not made better or worse by anything, sudden onset beginning a few weeks ago. Patient also complained of nausea, without vomiting.     Of note, patient endorsed change in frequency of bowel movements in past few weeks; sometimes she would have multiple BMs consisting of watery nonbloody diarrhea, and some days no BMs at all. She also noted change in stool appearance (somtimes solid, sometimes watery).     Patient denies CP, SOB, palpitations, vomiting, fever, diaphoresis, chills, sick contacts, recent travel, recent antibiotics use, any hx of bloody diarrhea.     In the ED, patient's vitals T 97, HR 88, BP 97/68, RR 16 and Sp02 99% ORA. Labs notable for AST 92. She received 3 L NS, dilaudid 1mg, dicylomine 10 mg, famotidine 20 mg, pantoprazole 10 mg, prochloperazine 10 mg. CT revealed mild gastric wall thickening and mild descending colonic wall thickening.     Of note, patient has had extensive outpatient f/u for similar presentations of abdominal pain, nausea, change in frequency of BMs and change in stool appearance with Dr. Abdalla including endoscopy, colonoscopy and CT imaging, all negative for IBD. 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   PAST MEDICAL & SURGICAL HISTORY:  IBS (irritable bowel syndrome)  Anxiety  Cholelithiasis  S/P cholecystectomy  History of bilateral breast reduction surgery  S/P sinus surgery  S/P rhinoplasty      SOCIAL HISTORY: No active tobacco, alcohol or illicit drug use    FAMILY HISTORY:  No pertinent family history in first degree relatives   No pertinent family history of CAD    Outpatient medications:  · 	Carafate 1 g/10 mL oral suspension: Last Dose Taken:  , 10 milliliter(s) orally 4 times a day (before meals and at bedtime), As Needed   · 	Lexapro 20 mg oral tablet: Last Dose Taken:  , 1 tab(s) orally once a day  · 	KlonoPIN 0.5 mg oral tablet: Last Dose Taken:  ,  orally , As Needed  · 	dicyclomine 10 mg oral capsule: Last Dose Taken:  , 2 cap(s) orally 4 times a day, As Needed  · 	Zofran ODT 4 mg oral tablet, disintegrating: Last Dose Taken:  , 1 tab(s) orally 3 times a day, As Needed  · 	belladonna: Last Dose Taken:      MEDICATIONS  (STANDING):  clonazePAM Tablet 1 milliGRAM(s) Oral at bedtime  escitalopram 35 milliGRAM(s) Oral daily  pantoprazole  Injectable 40 milliGRAM(s) IV Push daily  potassium chloride    Tablet ER 40 milliEquivalent(s) Oral once  sodium chloride 0.9%. 1000 milliLiter(s) (80 mL/Hr) IV Continuous <Continuous>  sucralfate suspension 1 Gram(s) Oral four times a day    MEDICATIONS  (PRN):  clonazePAM Tablet 0.5 milliGRAM(s) Oral two times a day PRN anxiety  dicyclomine 20 milliGRAM(s) Oral four times a day before meals PRN IBS  ondansetron Injectable 4 milliGRAM(s) IV Push every 6 hours PRN Nausea and/or Vomiting      Allergies    adhesives (Rash)  No Known Drug Allergies    Intolerances        REVIEW OF SYSTEMS: Is negative for eye, ENT, GI, , allergic, dermatologic, musculoskeletal and neurologic, except as described above.    VITAL SIGNS:   Vital Signs Last 24 Hrs  T(C): 36.9 (26 Jul 2018 13:32), Max: 37.3 (26 Jul 2018 08:29)  T(F): 98.4 (26 Jul 2018 13:32), Max: 99.2 (26 Jul 2018 08:29)  HR: 62 (26 Jul 2018 13:32) (62 - 87)  BP: 107/73 (26 Jul 2018 13:32) (100/66 - 120/75)  BP(mean): --  RR: 14 (26 Jul 2018 13:00) (14 - 16)  SpO2: 100% (26 Jul 2018 13:32) (98% - 100%)    I&O's Summary    25 Jul 2018 07:01  -  26 Jul 2018 07:00  --------------------------------------------------------  IN: 1360 mL / OUT: 0 mL / NET: 1360 mL        PHYSICAL EXAM:  GENERAL: NAD, well-groomed, well-developed  HEAD:  Atraumatic, Normocephalic  EYES: EOMI, PERRLA, conjunctiva and sclera clear  ENMT:  Moist mucous membranes, No lesions  NECK: Supple,   NERVOUS SYSTEM:  Alert & Oriented X3, Good concentration; Motor Strength 5/5 B/L upper and lower extremities; DTRs 2+ intact and symmetric  CHEST/LUNG: Clear to percussion bilaterally; No rales, rhonchi, wheezing,   HEART: Regular rate and rhythm; No murmurs, no tachy   ABDOMEN: Soft,  mild tender, Nondistended; Bowel sounds present  EXTREMITIES:  2+ Peripheral Pulses, No clubbing, cyanosis, or edema    SKIN: No rashes or lesions    LABS: All Labs Reviewed:                        10.5   6.33  )-----------( 257      ( 25 Jul 2018 05:52 )             31.4                         11.9   9.93  )-----------( 267      ( 25 Jul 2018 00:05 )             35.1     25 Jul 2018 05:52    142    |  109    |  6      ----------------------------<  104    3.4     |  26     |  0.64   24 Jul 2018 22:26    140    |  108    |  11     ----------------------------<  87     3.5     |  23     |  0.57     Ca    7.9        25 Jul 2018 05:52  Ca    8.8        24 Jul 2018 22:26    TPro  6.3    /  Alb  3.5    /  TBili  0.4    /  DBili  x      /  AST  292    /  ALT  173    /  AlkPhos  74     25 Jul 2018 05:52  TPro  7.4    /  Alb  4.1    /  TBili  0.4    /  DBili  x      /  AST  92     /  ALT  57     /  AlkPhos  70     24 Jul 2018 22:26          Blood Culture: Organism --  Gram Stain Blood -- Gram Stain --  Specimen Source .Urine Clean Catch (Midstream)  Culture-Blood --            RADIOLOGY: < from: CT Abdomen and Pelvis w/ Oral Cont and w/ IV Cont (07.25.18 @ 00:59) >  EXAM:  CT ABDOMEN AND PELVIS OC IC                            PROCEDURE DATE:  07/25/2018          INTERPRETATION:  CT ABDOMEN AND PELVIS WITH CONTRAST    INDICATION: Abdominal pain.    TECHNIQUE: Contrast enhanced CT of the abdomen and pelvis.     90 mL of Omnipaque 350 contrast material was injected IV.    COMPARISON: 6/23/2018.    FINDINGS:    Lower Thorax: No consolidation or effusion.        Liver: No suspicious lesions.      Biliary: No dilatation. Cholecystectomy.  Spleen: No suspicious lesions.      Pancreas: No inflammatory changes or ductal dilatation.      Adrenals: Normal.      Kidneys: No hydronephrosis or solid mass. Punctate nonobstructing left   intrarenal calculus.  Vessels: Normal caliber.        GI tract: No evidence of small bowel obstruction. Mild gastric wall   thickening is seen. Peritoneum/retroperitoneum and mesentery: No free   air. No organized fluid collection. No adenopathy.        Pelvic organs/Bladder: No pelvic masses. Bladder is normal.        Abdominal wall: Unremarkable.  Bones and soft tissues: No destructive lesion.        IMPRESSION:    Mild gastric wall thickening, may reflect gastritis in the appropriate   clinical setting. Similarly there is mild descending colonic wall   thickening, which may be due to underdistention or mild inflammation.   Clinical correlation with patient's symptoms is recommended. Endoscopy   can be considered as warranted.    Punctate nonobstructing left intrarenal calculus.                SAMANTHA MCDONALD M.D., ATTENDING RADIOLOGIST  This document has been electronically signed. Jul 25 2018  1:31AM    < end of copied text >      EKG: NSR, t wave inversion in V1-V3, III

## 2018-07-26 NOTE — DISCHARGE NOTE ADULT - ADDITIONAL INSTRUCTIONS
Follow-up with PCP and GI within 3-4 days after discharge Follow-up with Mica Funk (), Family Practice  [address] [place] [id number]  Phone: [phone]  Fax: [phone]  fu with in 1wk and GI within 3-4 days after discharge , fu out pt echo .

## 2018-07-26 NOTE — PROGRESS NOTE ADULT - ASSESSMENT
31 yo f pmh anxiety, GERD, cholelithiasis s/p cholecystectomy, nephrolithasis and intermittent diarrhea presents with complaint of abdominal pain , nausea and non bloody diarrhea x several weeks. Pt states this has been going on for a very long time- years- she has presented to the ED on 4 occasions due to the severity of the diarrhea. She has had an extensive outpatient work up which was been WNL up until this point. admitted     IBS: previous negative work up as outpatient. Likely flare associated with severe anxiety. Mild colonic thickening on CT. continue NPO. Bowel rest. Protonix . Zofran. carafate.    endo  and colonoscopy  pending .    at admission -  no cp but  ekg abn t wave inversion / cardio dr drake  to see pt , echo .     hx Anxiety: significant anxiety. Not well controlled on lexapro and klonopin. Anxiety likely contributing to diarrhea and abdominal pain.  Psych Consult Dr. Castellanos   hypokalemia replaced  , fu electrolyte .   Transaminitis: Mild elevation of LFTs , transient. No etoh use.   GERD: continue protonix.  stable     h/o + Toxoplasmosis: to be evaluated by Dr. Sandoval.     DVT ppx: Encourage ambulation.

## 2018-07-26 NOTE — PROGRESS NOTE ADULT - PROBLEM SELECTOR PLAN 1
f/u stool studies  for colonoscopy today  cont to monitor off antibx  no clear infectious etiology of symptoms at this point

## 2018-07-26 NOTE — BEHAVIORAL HEALTH ASSESSMENT NOTE - NSBHSUICPROTECTFACT_PSY_A_CORE
Future oriented/Positive therapeutic relationships/Responsibility to family and others/Supportive social network or family/Engaged in work or school/Identifies reasons for living

## 2018-07-26 NOTE — DISCHARGE NOTE ADULT - CARE PROVIDER_API CALL
Mica Salvador (), Family Practice  [address] [place] [address]  Phone: [phone]  Fax: [phone]    Litzy Abdalla), Gastroenterology; Internal Medicine  [address] [place] [address]  Phone: [phone]  Fax: [phone]

## 2018-07-26 NOTE — DISCHARGE NOTE ADULT - NS AS ACTIVITY OBS
Walking-Indoors allowed/Bathing allowed/Showering allowed/Driving allowed/Walking-Outdoors allowed/Stairs allowed

## 2018-07-26 NOTE — CONSULT NOTE ADULT - ASSESSMENT
33 yo F PMHx of anxiety ( on klonopin and lexapro), cholelithiasis s/p cholecystectomy, hx of nephrolithiasis, and GERD presents with "on and off" watery nonbloody diarrhea for the past few weeks prior to presentation, associated with epigastric abdominal pain and nausea. We have been consulted for abnormal EKG.   -EKG shows new t wave changes, t wave inversion in V1-V3, III, patient denies symptoms of angina, arrythmia, orthopnea.   -no known arrythmia  -no known valvular disease  -no evidence of volume overload, 31 yo F PMHx of anxiety ( on klonopin and lexapro), cholelithiasis s/p cholecystectomy, hx of nephrolithiasis, and GERD presents with "on and off" watery nonbloody diarrhea for the past few weeks prior to presentation, associated with epigastric abdominal pain and nausea. We have been consulted for abnormal EKG.   -EKG shows new t wave changes, t wave inversion in V1-V3, III, patient denies symptoms of angina, arrythmia, orthopnea. Changes possibly due to lead placement.  -repeat EKG  -check echo to evaluate ventricular function   -check cardiac enzymes though doubtful changes are related to ACS  -no known arrythmia  -no known valvular disease  -no evidence of volume overload,   -patient will follow up outpatient in our office for further testing  -further cardiac workup to depend on clinical course  -all other workup per primary team   -will follow 33 yo F PMHx of anxiety ( on klonopin and lexapro), cholelithiasis s/p cholecystectomy, hx of nephrolithiasis, and GERD presents with "on and off" watery nonbloody diarrhea for the past few weeks prior to presentation, associated with epigastric abdominal pain and nausea. We have been consulted for abnormal EKG.     -EKG shows new t wave changes, t wave inversion in V1-V3, III, patient denies symptoms of angina, arrythmia, orthopnea.   - Changes possibly due to lead placement.  -repeat EKG  - No signs of significant ischemia or volume overload.   - check cardiac enzymes though doubtful changes are related to ACS or myocarditis from toxo.   - can check echo to evaluate ventricular function   -further cardiac workup to depend on clinical course. Likely can followup in office for further testing   -all other workup per primary team   -will follow

## 2018-07-26 NOTE — DISCHARGE NOTE ADULT - PLAN OF CARE
Repeat EKG, normal with nonspecific changes   Recommend outpatient echocardiogram Continue home medication of Carafate and Dicyclomine.    F/u with GI outpatient.  Awaiting stool feces results, PMD please follow-up. Continue home medications of Lexapro and Klonapin.   F/u with PCP. Continue home medication of carafate.    F/u with GI. symptom resolved stable stable / post endo gastritis

## 2018-07-27 VITALS
TEMPERATURE: 99 F | DIASTOLIC BLOOD PRESSURE: 63 MMHG | SYSTOLIC BLOOD PRESSURE: 97 MMHG | OXYGEN SATURATION: 96 % | RESPIRATION RATE: 15 BRPM | HEART RATE: 67 BPM

## 2018-07-27 LAB
-  AMPICILLIN: SIGNIFICANT CHANGE UP
-  CIPROFLOXACIN: SIGNIFICANT CHANGE UP
-  LEVOFLOXACIN: SIGNIFICANT CHANGE UP
-  NITROFURANTOIN: SIGNIFICANT CHANGE UP
-  TETRACYCLINE: SIGNIFICANT CHANGE UP
-  VANCOMYCIN: SIGNIFICANT CHANGE UP
ALBUMIN SERPL ELPH-MCNC: 3.4 G/DL — SIGNIFICANT CHANGE UP (ref 3.3–5)
ALP SERPL-CCNC: 65 U/L — SIGNIFICANT CHANGE UP (ref 40–120)
ALT FLD-CCNC: 82 U/L — HIGH (ref 12–78)
ANION GAP SERPL CALC-SCNC: 6 MMOL/L — SIGNIFICANT CHANGE UP (ref 5–17)
AST SERPL-CCNC: 26 U/L — SIGNIFICANT CHANGE UP (ref 15–37)
AUTO DIFF PNL BLD: NEGATIVE — SIGNIFICANT CHANGE UP
BASOPHILS # BLD AUTO: 0.06 K/UL — SIGNIFICANT CHANGE UP (ref 0–0.2)
BASOPHILS NFR BLD AUTO: 0.9 % — SIGNIFICANT CHANGE UP (ref 0–2)
BILIRUB DIRECT SERPL-MCNC: 0.1 MG/DL — SIGNIFICANT CHANGE UP (ref 0.05–0.2)
BILIRUB INDIRECT FLD-MCNC: 0.2 MG/DL — SIGNIFICANT CHANGE UP (ref 0.2–1)
BILIRUB SERPL-MCNC: 0.3 MG/DL — SIGNIFICANT CHANGE UP (ref 0.2–1.2)
BUN SERPL-MCNC: 6 MG/DL — LOW (ref 7–23)
C-ANCA SER-ACNC: NEGATIVE — SIGNIFICANT CHANGE UP
CALCIUM SERPL-MCNC: 8.3 MG/DL — LOW (ref 8.5–10.1)
CHLORIDE SERPL-SCNC: 108 MMOL/L — SIGNIFICANT CHANGE UP (ref 96–108)
CO2 SERPL-SCNC: 26 MMOL/L — SIGNIFICANT CHANGE UP (ref 22–31)
CREAT SERPL-MCNC: 0.55 MG/DL — SIGNIFICANT CHANGE UP (ref 0.5–1.3)
CULTURE RESULTS: SIGNIFICANT CHANGE UP
CULTURE RESULTS: SIGNIFICANT CHANGE UP
EOSINOPHIL # BLD AUTO: 0.14 K/UL — SIGNIFICANT CHANGE UP (ref 0–0.5)
EOSINOPHIL NFR BLD AUTO: 2 % — SIGNIFICANT CHANGE UP (ref 0–6)
GLUCOSE SERPL-MCNC: 79 MG/DL — SIGNIFICANT CHANGE UP (ref 70–99)
HCT VFR BLD CALC: 32.8 % — LOW (ref 34.5–45)
HGB BLD-MCNC: 10.7 G/DL — LOW (ref 11.5–15.5)
IMM GRANULOCYTES NFR BLD AUTO: 0.3 % — SIGNIFICANT CHANGE UP (ref 0–1.5)
LYMPHOCYTES # BLD AUTO: 1.63 K/UL — SIGNIFICANT CHANGE UP (ref 1–3.3)
LYMPHOCYTES # BLD AUTO: 23.3 % — SIGNIFICANT CHANGE UP (ref 13–44)
MCHC RBC-ENTMCNC: 29.2 PG — SIGNIFICANT CHANGE UP (ref 27–34)
MCHC RBC-ENTMCNC: 32.6 GM/DL — SIGNIFICANT CHANGE UP (ref 32–36)
MCV RBC AUTO: 89.4 FL — SIGNIFICANT CHANGE UP (ref 80–100)
METHOD TYPE: SIGNIFICANT CHANGE UP
MITOCHONDRIA AB SER-ACNC: SIGNIFICANT CHANGE UP
MONOCYTES # BLD AUTO: 0.66 K/UL — SIGNIFICANT CHANGE UP (ref 0–0.9)
MONOCYTES NFR BLD AUTO: 9.4 % — SIGNIFICANT CHANGE UP (ref 2–14)
NEUTROPHILS # BLD AUTO: 4.5 K/UL — SIGNIFICANT CHANGE UP (ref 1.8–7.4)
NEUTROPHILS NFR BLD AUTO: 64.1 % — SIGNIFICANT CHANGE UP (ref 43–77)
NRBC # BLD: 0 /100 WBCS — SIGNIFICANT CHANGE UP (ref 0–0)
ORGANISM # SPEC MICROSCOPIC CNT: SIGNIFICANT CHANGE UP
ORGANISM # SPEC MICROSCOPIC CNT: SIGNIFICANT CHANGE UP
P-ANCA SER-ACNC: NEGATIVE — SIGNIFICANT CHANGE UP
PLATELET # BLD AUTO: 254 K/UL — SIGNIFICANT CHANGE UP (ref 150–400)
POTASSIUM SERPL-MCNC: 3.9 MMOL/L — SIGNIFICANT CHANGE UP (ref 3.5–5.3)
POTASSIUM SERPL-SCNC: 3.9 MMOL/L — SIGNIFICANT CHANGE UP (ref 3.5–5.3)
PROT SERPL-MCNC: 6.5 G/DL — SIGNIFICANT CHANGE UP (ref 6–8.3)
RBC # BLD: 3.67 M/UL — LOW (ref 3.8–5.2)
RBC # FLD: 12.5 % — SIGNIFICANT CHANGE UP (ref 10.3–14.5)
SMOOTH MUSCLE AB SER-ACNC: SIGNIFICANT CHANGE UP
SODIUM SERPL-SCNC: 140 MMOL/L — SIGNIFICANT CHANGE UP (ref 135–145)
SPECIMEN SOURCE: SIGNIFICANT CHANGE UP
SPECIMEN SOURCE: SIGNIFICANT CHANGE UP
WBC # BLD: 7.01 K/UL — SIGNIFICANT CHANGE UP (ref 3.8–10.5)
WBC # FLD AUTO: 7.01 K/UL — SIGNIFICANT CHANGE UP (ref 3.8–10.5)

## 2018-07-27 PROCEDURE — 87328 CRYPTOSPORIDIUM AG IA: CPT

## 2018-07-27 PROCEDURE — 86381 MITOCHONDRIAL ANTIBODY EACH: CPT

## 2018-07-27 PROCEDURE — 87186 SC STD MICRODIL/AGAR DIL: CPT

## 2018-07-27 PROCEDURE — 86778 TOXOPLASMA ANTIBODY IGM: CPT

## 2018-07-27 PROCEDURE — 82550 ASSAY OF CK (CPK): CPT

## 2018-07-27 PROCEDURE — 87086 URINE CULTURE/COLONY COUNT: CPT

## 2018-07-27 PROCEDURE — 84484 ASSAY OF TROPONIN QUANT: CPT

## 2018-07-27 PROCEDURE — 86431 RHEUMATOID FACTOR QUANT: CPT

## 2018-07-27 PROCEDURE — C8929: CPT

## 2018-07-27 PROCEDURE — 83520 IMMUNOASSAY QUANT NOS NONAB: CPT

## 2018-07-27 PROCEDURE — 85027 COMPLETE CBC AUTOMATED: CPT

## 2018-07-27 PROCEDURE — 88313 SPECIAL STAINS GROUP 2: CPT

## 2018-07-27 PROCEDURE — 36415 COLL VENOUS BLD VENIPUNCTURE: CPT

## 2018-07-27 PROCEDURE — 93005 ELECTROCARDIOGRAM TRACING: CPT

## 2018-07-27 PROCEDURE — 84702 CHORIONIC GONADOTROPIN TEST: CPT

## 2018-07-27 PROCEDURE — 86777 TOXOPLASMA ANTIBODY: CPT

## 2018-07-27 PROCEDURE — 88305 TISSUE EXAM BY PATHOLOGIST: CPT

## 2018-07-27 PROCEDURE — 88312 SPECIAL STAINS GROUP 1: CPT

## 2018-07-27 PROCEDURE — 83690 ASSAY OF LIPASE: CPT

## 2018-07-27 PROCEDURE — 80053 COMPREHEN METABOLIC PANEL: CPT

## 2018-07-27 PROCEDURE — 81003 URINALYSIS AUTO W/O SCOPE: CPT

## 2018-07-27 PROCEDURE — 85652 RBC SED RATE AUTOMATED: CPT

## 2018-07-27 PROCEDURE — 82553 CREATINE MB FRACTION: CPT

## 2018-07-27 PROCEDURE — 86140 C-REACTIVE PROTEIN: CPT

## 2018-07-27 PROCEDURE — 87389 HIV-1 AG W/HIV-1&-2 AB AG IA: CPT

## 2018-07-27 PROCEDURE — 99285 EMERGENCY DEPT VISIT HI MDM: CPT | Mod: 25

## 2018-07-27 PROCEDURE — 86036 ANCA SCREEN EACH ANTIBODY: CPT

## 2018-07-27 PROCEDURE — 80048 BASIC METABOLIC PNL TOTAL CA: CPT

## 2018-07-27 PROCEDURE — 87177 OVA AND PARASITES SMEARS: CPT

## 2018-07-27 PROCEDURE — 84145 PROCALCITONIN (PCT): CPT

## 2018-07-27 PROCEDURE — 99239 HOSP IP/OBS DSCHRG MGMT >30: CPT

## 2018-07-27 PROCEDURE — 86038 ANTINUCLEAR ANTIBODIES: CPT

## 2018-07-27 PROCEDURE — 74177 CT ABD & PELVIS W/CONTRAST: CPT

## 2018-07-27 PROCEDURE — 80076 HEPATIC FUNCTION PANEL: CPT

## 2018-07-27 PROCEDURE — 86255 FLUORESCENT ANTIBODY SCREEN: CPT

## 2018-07-27 PROCEDURE — 99232 SBSQ HOSP IP/OBS MODERATE 35: CPT

## 2018-07-27 PROCEDURE — 82150 ASSAY OF AMYLASE: CPT

## 2018-07-27 RX ORDER — SUCRALFATE 1 G
10 TABLET ORAL
Qty: 100 | Refills: 0
Start: 2018-07-27 | End: 2018-08-05

## 2018-07-27 RX ORDER — BELLADONNA ALKALOIDS 30MG/100ML
0 LIQUID (ML) MISCELLANEOUS
Qty: 0 | Refills: 0 | COMMUNITY

## 2018-07-27 RX ADMIN — Medication 1 GRAM(S): at 05:00

## 2018-07-27 RX ADMIN — PANTOPRAZOLE SODIUM 40 MILLIGRAM(S): 20 TABLET, DELAYED RELEASE ORAL at 11:20

## 2018-07-27 RX ADMIN — ESCITALOPRAM OXALATE 35 MILLIGRAM(S): 10 TABLET, FILM COATED ORAL at 11:20

## 2018-07-27 RX ADMIN — Medication 20 MILLIGRAM(S): at 08:54

## 2018-07-27 RX ADMIN — Medication 1 GRAM(S): at 11:20

## 2018-07-27 NOTE — PROGRESS NOTE ADULT - ASSESSMENT
Chronic diarrhea/abdominal pain  Main concern on my part is IBD, though no fevers, blood in stool, and not especially anemic presently. Has several potential extraintestinal manifestations (kidney stones, aphthous ulcers).  However, multiple EGD and colonoscopies without apparent significant pathological change.

## 2018-07-27 NOTE — PROGRESS NOTE ADULT - ATTENDING COMMENTS
I personally saw and examined the patient in detail.  I have spoken to the above provider regarding the assessment and plan of care.  I reviewed the above assessment and plan of care, and agree.  I have made changes in the body of the note where appropriate.
Patient may follow up with me nonurgently for results.  No ID objection to outpatient management.    Thank you for the courtesy of this referral.  Salinas Sandoval MD  [phone]

## 2018-07-27 NOTE — PROGRESS NOTE ADULT - SUBJECTIVE AND OBJECTIVE BOX
[facility] - Yeny Valverde, Zuleyka, Luis, Elizabeth, Zack Mateuszstaci  Office Number:  [phone]    Patient resting comfortably in bed in NAD.  Laying flat with no respiratory distress.  No complaints of chest pain, dyspnea, palpitations, PND, or orthopnea    MEDICATIONS  (STANDING):  clonazePAM Tablet 1 milliGRAM(s) Oral at bedtime  escitalopram 35 milliGRAM(s) Oral daily  pantoprazole  Injectable 40 milliGRAM(s) IV Push daily  sodium chloride 0.9%. 1000 milliLiter(s) (80 mL/Hr) IV Continuous <Continuous>  sucralfate suspension 1 Gram(s) Oral four times a day    MEDICATIONS  (PRN):  clonazePAM Tablet 0.5 milliGRAM(s) Oral two times a day PRN anxiety  dicyclomine 20 milliGRAM(s) Oral four times a day before meals PRN IBS  ondansetron Injectable 4 milliGRAM(s) IV Push every 6 hours PRN Nausea and/or Vomiting      Allergies    adhesives (Rash)  No Known Drug Allergies    Intolerances        Vital Signs Last 24 Hrs  T(C): 36.9 (27 Jul 2018 05:28), Max: 37.6 (26 Jul 2018 20:38)  T(F): 98.4 (27 Jul 2018 05:28), Max: 99.6 (26 Jul 2018 20:38)  HR: 80 (27 Jul 2018 05:28) (62 - 80)  BP: 103/71 (27 Jul 2018 05:28) (103/71 - 120/75)  BP(mean): --  RR: 14 (27 Jul 2018 05:28) (14 - 16)  SpO2: 98% (27 Jul 2018 05:28) (96% - 100%)    I&O's Summary      ON EXAM:    General: NAD, awake and alert, oriented x 3  HEENT: Mucous membranes are moist, anicteric  Lungs: Non-labored, breath sounds are clear bilaterally, No wheezing, rales or rhonchi  Cardiovascular: Regular, S1 and S2, no murmurs, rubs, or gallops  Gastrointestinal: Bowel Sounds present, soft, nontender.   Lymph: No peripheral edema. No lymphadenopathy.  Skin: No rashes or ulcers  Psych:  Mood & affect appropriate    LABS: All Labs Reviewed:                        10.7   7.01  )-----------( 254      ( 27 Jul 2018 06:11 )             32.8                         10.5   6.33  )-----------( 257      ( 25 Jul 2018 05:52 )             31.4                         11.9   9.93  )-----------( 267      ( 25 Jul 2018 00:05 )             35.1     27 Jul 2018 06:11    140    |  108    |  6      ----------------------------<  79     3.9     |  26     |  0.55   25 Jul 2018 05:52    142    |  109    |  6      ----------------------------<  104    3.4     |  26     |  0.64   24 Jul 2018 22:26    140    |  108    |  11     ----------------------------<  87     3.5     |  23     |  0.57     Ca    8.3        27 Jul 2018 06:11  Ca    7.9        25 Jul 2018 05:52  Ca    8.8        24 Jul 2018 22:26    TPro  6.5    /  Alb  3.4    /  TBili  0.3    /  DBili  .10    /  AST  26     /  ALT  82     /  AlkPhos  65     27 Jul 2018 06:11  TPro  6.3    /  Alb  3.5    /  TBili  0.4    /  DBili  x      /  AST  292    /  ALT  173    /  AlkPhos  74     25 Jul 2018 05:52  TPro  7.4    /  Alb  4.1    /  TBili  0.4    /  DBili  x      /  AST  92     /  ALT  57     /  AlkPhos  70     24 Jul 2018 22:26      CARDIAC MARKERS ( 26 Jul 2018 19:23 )  <.015 ng/mL / x     / 83 U/L / x     / <1.0 ng/mL      Blood Culture: Organism --  Gram Stain Blood -- Gram Stain --  Specimen Source .Stool Feces  Culture-Blood --    Organism --  Gram Stain Blood -- Gram Stain --  Specimen Source .Urine Clean Catch (Midstream)  Culture-Blood --        EKG: NSR 60

## 2018-07-27 NOTE — PROGRESS NOTE ADULT - ASSESSMENT
33 yo F PMHx of anxiety ( on klonopin and lexapro), cholelithiasis s/p cholecystectomy, hx of nephrolithiasis, and GERD presents with "on and off" watery nonbloody diarrhea for the past few weeks prior to presentation, associated with epigastric abdominal pain and nausea. We have been consulted for abnormal EKG.     -Repeat EKG NSR, t wave changes no longer seen.   - Changes in previous EKG likely due to lead placement  -CE negative  - No signs of significant ischemia or volume overload.   - can check echo to evaluate ventricular function   -further cardiac workup to depend on clinical course. Likely can followup in office for further testing   -all other workup per primary team   -will follow 33 yo F PMHx of anxiety ( on klonopin and lexapro), cholelithiasis s/p cholecystectomy, hx of nephrolithiasis, and GERD presents with "on and off" watery nonbloody diarrhea for the past few weeks prior to presentation, associated with epigastric abdominal pain and nausea. We have been consulted for abnormal EKG.     -Repeat EKG NSR, t wave changes no longer seen.   - Changes in previous EKG likely due to lead placement  -CE negative  - No signs of significant ischemia or volume overload.   - can check echo to evaluate ventricular function.  This can be done as outpatient as well  -all other workup per primary team   -will follow

## 2018-07-27 NOTE — PROGRESS NOTE ADULT - SUBJECTIVE AND OBJECTIVE BOX
DEPARTMENT OF ANESTHESIA  POST ANESTHETIC EVALUATION    The Patient was interviewed and evaluated    Vital Signs Last 24 Hrs  T(C): 36.9 (27 Jul 2018 07:54), Max: 37.6 (26 Jul 2018 20:38)  T(F): 98.4 (27 Jul 2018 07:54), Max: 99.6 (26 Jul 2018 20:38)  HR: 69 (27 Jul 2018 07:54) (62 - 80)  BP: 104/71 (27 Jul 2018 07:54) (103/71 - 120/75)  BP(mean): --  RR: 18 (27 Jul 2018 07:54) (14 - 18)  SpO2: 99% (27 Jul 2018 07:54) (96% - 100%)    Evaluation:  s/p EGD/Colonoscopy    (x ) No apparent complications or complaints regarding anesthesia care at this time  (x ) All questions were answered    Condition:  (x ) Stable      ( ) Guarded      ( ) Critical    Recommendations:  (x ) None     ( ) Other:

## 2018-07-28 LAB
ANA TITR SER: NEGATIVE — SIGNIFICANT CHANGE UP
CULTURE RESULTS: SIGNIFICANT CHANGE UP
SPECIMEN SOURCE: SIGNIFICANT CHANGE UP

## 2018-07-30 LAB
BAKER'S YEAST IGA QN IA: 10.2 UNITS — SIGNIFICANT CHANGE UP
BAKER'S YEAST IGA QN IA: NEGATIVE — SIGNIFICANT CHANGE UP
BAKER'S YEAST IGG QN IA: 15.3 UNITS — SIGNIFICANT CHANGE UP
BAKER'S YEAST IGG QN IA: NEGATIVE — SIGNIFICANT CHANGE UP

## 2018-08-02 ENCOUNTER — RESULT REVIEW (OUTPATIENT)
Age: 33
End: 2018-08-02

## 2018-08-28 ENCOUNTER — EMERGENCY (EMERGENCY)
Facility: HOSPITAL | Age: 33
LOS: 1 days | Discharge: HOME | End: 2018-08-28
Attending: EMERGENCY MEDICINE
Payer: COMMERCIAL

## 2018-08-28 VITALS
HEART RATE: 98 BPM | TEMPERATURE: 98 F | RESPIRATION RATE: 16 BRPM | DIASTOLIC BLOOD PRESSURE: 74 MMHG | SYSTOLIC BLOOD PRESSURE: 112 MMHG | OXYGEN SATURATION: 98 %

## 2018-08-28 VITALS
RESPIRATION RATE: 16 BRPM | HEIGHT: 63 IN | SYSTOLIC BLOOD PRESSURE: 106 MMHG | HEART RATE: 111 BPM | TEMPERATURE: 98 F | WEIGHT: 169.98 LBS | OXYGEN SATURATION: 98 % | DIASTOLIC BLOOD PRESSURE: 77 MMHG

## 2018-08-28 DIAGNOSIS — Z90.49 ACQUIRED ABSENCE OF OTHER SPECIFIED PARTS OF DIGESTIVE TRACT: Chronic | ICD-10-CM

## 2018-08-28 DIAGNOSIS — Z90.13 ACQUIRED ABSENCE OF BILATERAL BREASTS AND NIPPLES: Chronic | ICD-10-CM

## 2018-08-28 LAB
ALBUMIN SERPL ELPH-MCNC: 3.8 G/DL — SIGNIFICANT CHANGE UP (ref 3.3–5)
ALP SERPL-CCNC: 67 U/L — SIGNIFICANT CHANGE UP (ref 40–120)
ALT FLD-CCNC: 173 U/L — HIGH (ref 12–78)
ANION GAP SERPL CALC-SCNC: 8 MMOL/L — SIGNIFICANT CHANGE UP (ref 5–17)
APPEARANCE UR: CLEAR — SIGNIFICANT CHANGE UP
AST SERPL-CCNC: 295 U/L — HIGH (ref 15–37)
BASOPHILS # BLD AUTO: 0.06 K/UL — SIGNIFICANT CHANGE UP (ref 0–0.2)
BASOPHILS NFR BLD AUTO: 0.7 % — SIGNIFICANT CHANGE UP (ref 0–2)
BILIRUB SERPL-MCNC: 0.5 MG/DL — SIGNIFICANT CHANGE UP (ref 0.2–1.2)
BILIRUB UR-MCNC: NEGATIVE — SIGNIFICANT CHANGE UP
BUN SERPL-MCNC: 9 MG/DL — SIGNIFICANT CHANGE UP (ref 7–23)
CALCIUM SERPL-MCNC: 9.5 MG/DL — SIGNIFICANT CHANGE UP (ref 8.5–10.1)
CHLORIDE SERPL-SCNC: 109 MMOL/L — HIGH (ref 96–108)
CO2 SERPL-SCNC: 26 MMOL/L — SIGNIFICANT CHANGE UP (ref 22–31)
COLOR SPEC: SIGNIFICANT CHANGE UP
CREAT SERPL-MCNC: 0.64 MG/DL — SIGNIFICANT CHANGE UP (ref 0.5–1.3)
DIFF PNL FLD: NEGATIVE — SIGNIFICANT CHANGE UP
EOSINOPHIL # BLD AUTO: 0.04 K/UL — SIGNIFICANT CHANGE UP (ref 0–0.5)
EOSINOPHIL NFR BLD AUTO: 0.5 % — SIGNIFICANT CHANGE UP (ref 0–6)
GLUCOSE SERPL-MCNC: 100 MG/DL — HIGH (ref 70–99)
GLUCOSE UR QL: NEGATIVE — SIGNIFICANT CHANGE UP
HCT VFR BLD CALC: 36.3 % — SIGNIFICANT CHANGE UP (ref 34.5–45)
HGB BLD-MCNC: 12.1 G/DL — SIGNIFICANT CHANGE UP (ref 11.5–15.5)
IMM GRANULOCYTES NFR BLD AUTO: 0.2 % — SIGNIFICANT CHANGE UP (ref 0–1.5)
KETONES UR-MCNC: NEGATIVE — SIGNIFICANT CHANGE UP
LEUKOCYTE ESTERASE UR-ACNC: NEGATIVE — SIGNIFICANT CHANGE UP
LIDOCAIN IGE QN: 248 U/L — SIGNIFICANT CHANGE UP (ref 73–393)
LYMPHOCYTES # BLD AUTO: 2.09 K/UL — SIGNIFICANT CHANGE UP (ref 1–3.3)
LYMPHOCYTES # BLD AUTO: 24.8 % — SIGNIFICANT CHANGE UP (ref 13–44)
MCHC RBC-ENTMCNC: 29.5 PG — SIGNIFICANT CHANGE UP (ref 27–34)
MCHC RBC-ENTMCNC: 33.3 GM/DL — SIGNIFICANT CHANGE UP (ref 32–36)
MCV RBC AUTO: 88.5 FL — SIGNIFICANT CHANGE UP (ref 80–100)
MONOCYTES # BLD AUTO: 0.72 K/UL — SIGNIFICANT CHANGE UP (ref 0–0.9)
MONOCYTES NFR BLD AUTO: 8.6 % — SIGNIFICANT CHANGE UP (ref 2–14)
NEUTROPHILS # BLD AUTO: 5.49 K/UL — SIGNIFICANT CHANGE UP (ref 1.8–7.4)
NEUTROPHILS NFR BLD AUTO: 65.2 % — SIGNIFICANT CHANGE UP (ref 43–77)
NITRITE UR-MCNC: NEGATIVE — SIGNIFICANT CHANGE UP
NRBC # BLD: 0 /100 WBCS — SIGNIFICANT CHANGE UP (ref 0–0)
PH UR: 7 — SIGNIFICANT CHANGE UP (ref 5–8)
PLATELET # BLD AUTO: 211 K/UL — SIGNIFICANT CHANGE UP (ref 150–400)
POTASSIUM SERPL-MCNC: 3.6 MMOL/L — SIGNIFICANT CHANGE UP (ref 3.5–5.3)
POTASSIUM SERPL-SCNC: 3.6 MMOL/L — SIGNIFICANT CHANGE UP (ref 3.5–5.3)
PROT SERPL-MCNC: 7.2 G/DL — SIGNIFICANT CHANGE UP (ref 6–8.3)
PROT UR-MCNC: NEGATIVE — SIGNIFICANT CHANGE UP
RBC # BLD: 4.1 M/UL — SIGNIFICANT CHANGE UP (ref 3.8–5.2)
RBC # FLD: 12.8 % — SIGNIFICANT CHANGE UP (ref 10.3–14.5)
SODIUM SERPL-SCNC: 143 MMOL/L — SIGNIFICANT CHANGE UP (ref 135–145)
SP GR SPEC: 1 — LOW (ref 1.01–1.02)
UROBILINOGEN FLD QL: NEGATIVE — SIGNIFICANT CHANGE UP
WBC # BLD: 8.42 K/UL — SIGNIFICANT CHANGE UP (ref 3.8–10.5)
WBC # FLD AUTO: 8.42 K/UL — SIGNIFICANT CHANGE UP (ref 3.8–10.5)

## 2018-08-28 PROCEDURE — 99285 EMERGENCY DEPT VISIT HI MDM: CPT

## 2018-08-28 PROCEDURE — 99284 EMERGENCY DEPT VISIT MOD MDM: CPT | Mod: 25

## 2018-08-28 PROCEDURE — 80053 COMPREHEN METABOLIC PANEL: CPT

## 2018-08-28 PROCEDURE — 83690 ASSAY OF LIPASE: CPT

## 2018-08-28 PROCEDURE — 96361 HYDRATE IV INFUSION ADD-ON: CPT

## 2018-08-28 PROCEDURE — 96374 THER/PROPH/DIAG INJ IV PUSH: CPT

## 2018-08-28 PROCEDURE — 81003 URINALYSIS AUTO W/O SCOPE: CPT

## 2018-08-28 PROCEDURE — 85027 COMPLETE CBC AUTOMATED: CPT

## 2018-08-28 PROCEDURE — 96375 TX/PRO/DX INJ NEW DRUG ADDON: CPT

## 2018-08-28 RX ORDER — SODIUM CHLORIDE 9 MG/ML
1000 INJECTION INTRAMUSCULAR; INTRAVENOUS; SUBCUTANEOUS ONCE
Qty: 0 | Refills: 0 | Status: COMPLETED | OUTPATIENT
Start: 2018-08-28 | End: 2018-08-28

## 2018-08-28 RX ORDER — ONDANSETRON 8 MG/1
4 TABLET, FILM COATED ORAL ONCE
Qty: 0 | Refills: 0 | Status: DISCONTINUED | OUTPATIENT
Start: 2018-08-28 | End: 2018-08-28

## 2018-08-28 RX ORDER — LIDOCAINE 4 G/100G
10 CREAM TOPICAL ONCE
Qty: 0 | Refills: 0 | Status: COMPLETED | OUTPATIENT
Start: 2018-08-28 | End: 2018-08-28

## 2018-08-28 RX ORDER — DIPHENHYDRAMINE HCL 50 MG
50 CAPSULE ORAL ONCE
Qty: 0 | Refills: 0 | Status: COMPLETED | OUTPATIENT
Start: 2018-08-28 | End: 2018-08-28

## 2018-08-28 RX ORDER — METOCLOPRAMIDE HCL 10 MG
10 TABLET ORAL ONCE
Qty: 0 | Refills: 0 | Status: COMPLETED | OUTPATIENT
Start: 2018-08-28 | End: 2018-08-28

## 2018-08-28 RX ORDER — SUCRALFATE 1 G
1 TABLET ORAL ONCE
Qty: 0 | Refills: 0 | Status: COMPLETED | OUTPATIENT
Start: 2018-08-28 | End: 2018-08-28

## 2018-08-28 RX ORDER — SODIUM CHLORIDE 9 MG/ML
3 INJECTION INTRAMUSCULAR; INTRAVENOUS; SUBCUTANEOUS ONCE
Qty: 0 | Refills: 0 | Status: COMPLETED | OUTPATIENT
Start: 2018-08-28 | End: 2018-08-28

## 2018-08-28 RX ADMIN — SODIUM CHLORIDE 1000 MILLILITER(S): 9 INJECTION INTRAMUSCULAR; INTRAVENOUS; SUBCUTANEOUS at 03:15

## 2018-08-28 RX ADMIN — SODIUM CHLORIDE 1000 MILLILITER(S): 9 INJECTION INTRAMUSCULAR; INTRAVENOUS; SUBCUTANEOUS at 02:14

## 2018-08-28 RX ADMIN — Medication 30 MILLILITER(S): at 02:22

## 2018-08-28 RX ADMIN — Medication 1 GRAM(S): at 02:22

## 2018-08-28 RX ADMIN — Medication 10 MILLIGRAM(S): at 02:17

## 2018-08-28 RX ADMIN — Medication 50 MILLIGRAM(S): at 02:42

## 2018-08-28 RX ADMIN — LIDOCAINE 10 MILLILITER(S): 4 CREAM TOPICAL at 02:21

## 2018-08-28 RX ADMIN — SODIUM CHLORIDE 3 MILLILITER(S): 9 INJECTION INTRAMUSCULAR; INTRAVENOUS; SUBCUTANEOUS at 02:12

## 2018-08-28 RX ADMIN — SODIUM CHLORIDE 1000 MILLILITER(S): 9 INJECTION INTRAMUSCULAR; INTRAVENOUS; SUBCUTANEOUS at 03:50

## 2018-08-28 RX ADMIN — SODIUM CHLORIDE 1000 MILLILITER(S): 9 INJECTION INTRAMUSCULAR; INTRAVENOUS; SUBCUTANEOUS at 04:50

## 2018-08-28 NOTE — ED ADULT NURSE NOTE - OBJECTIVE STATEMENT
Patient brought in by EMS c/o diffuse abdominal pain with nausea tonight. Denies fever, no burning on urination.

## 2018-08-28 NOTE — ED PROVIDER NOTE - PROGRESS NOTE DETAILS
pt reevalutaed, pain has improved but still feels spasms, most likely due to new meds, pt advised to follow up with GI, continue other meds as prescribed, return if any sytmpos worsne

## 2018-08-28 NOTE — ED PROVIDER NOTE - OBJECTIVE STATEMENT
31yo female bib ems with sudden onset of abd pain tonjeny. pt has hx of IBS, has had workup for diagnosis of her abd pain, and is following with a GI doc in the city, pt was put on new meds a few days ago and tonite had sudden onset of sharp diffuse abd pain, non radiating, no vomiting, +nausea, no fever, chills, pt took her zofran ODT and bentyl with no relief.

## 2018-08-28 NOTE — ED ADULT NURSE NOTE - NSIMPLEMENTINTERV_GEN_ALL_ED
Implemented All Universal Safety Interventions:  [place] to call system. Call bell, personal items and telephone within reach. Instruct patient to call for assistance. Room bathroom lighting operational. Non-slip footwear when patient is off stretcher. Physically safe environment: no spills, clutter or unnecessary equipment. Stretcher in lowest position, wheels locked, appropriate side rails in place.

## 2018-08-28 NOTE — ED ADULT NURSE NOTE - CHPI ED NUR SYMPTOMS NEG
no diarrhea/no abdominal distension/no chills/no dysuria/no hematuria/no blood in stool/no burning urination

## 2019-01-01 ENCOUNTER — EMERGENCY (EMERGENCY)
Facility: HOSPITAL | Age: 34
LOS: 1 days | Discharge: HOME | End: 2019-01-01
Attending: EMERGENCY MEDICINE | Admitting: EMERGENCY MEDICINE
Payer: COMMERCIAL

## 2019-01-01 VITALS
SYSTOLIC BLOOD PRESSURE: 119 MMHG | HEIGHT: 63 IN | OXYGEN SATURATION: 99 % | WEIGHT: 166.89 LBS | DIASTOLIC BLOOD PRESSURE: 85 MMHG | TEMPERATURE: 98 F | HEART RATE: 104 BPM | RESPIRATION RATE: 18 BRPM

## 2019-01-01 DIAGNOSIS — Z90.13 ACQUIRED ABSENCE OF BILATERAL BREASTS AND NIPPLES: Chronic | ICD-10-CM

## 2019-01-01 DIAGNOSIS — Z90.49 ACQUIRED ABSENCE OF OTHER SPECIFIED PARTS OF DIGESTIVE TRACT: Chronic | ICD-10-CM

## 2019-01-01 LAB
ALBUMIN SERPL ELPH-MCNC: 3.7 G/DL — SIGNIFICANT CHANGE UP (ref 3.3–5)
ALP SERPL-CCNC: 64 U/L — SIGNIFICANT CHANGE UP (ref 40–120)
ALT FLD-CCNC: 45 U/L — SIGNIFICANT CHANGE UP (ref 12–78)
ANION GAP SERPL CALC-SCNC: 8 MMOL/L — SIGNIFICANT CHANGE UP (ref 5–17)
AST SERPL-CCNC: 58 U/L — HIGH (ref 15–37)
BASOPHILS # BLD AUTO: 0.05 K/UL — SIGNIFICANT CHANGE UP (ref 0–0.2)
BASOPHILS NFR BLD AUTO: 0.7 % — SIGNIFICANT CHANGE UP (ref 0–2)
BILIRUB SERPL-MCNC: 0.6 MG/DL — SIGNIFICANT CHANGE UP (ref 0.2–1.2)
BUN SERPL-MCNC: 18 MG/DL — SIGNIFICANT CHANGE UP (ref 7–23)
CALCIUM SERPL-MCNC: 8.4 MG/DL — LOW (ref 8.5–10.1)
CHLORIDE SERPL-SCNC: 105 MMOL/L — SIGNIFICANT CHANGE UP (ref 96–108)
CO2 SERPL-SCNC: 25 MMOL/L — SIGNIFICANT CHANGE UP (ref 22–31)
CREAT SERPL-MCNC: 0.67 MG/DL — SIGNIFICANT CHANGE UP (ref 0.5–1.3)
EOSINOPHIL # BLD AUTO: 0.07 K/UL — SIGNIFICANT CHANGE UP (ref 0–0.5)
EOSINOPHIL NFR BLD AUTO: 1 % — SIGNIFICANT CHANGE UP (ref 0–6)
GLUCOSE SERPL-MCNC: 71 MG/DL — SIGNIFICANT CHANGE UP (ref 70–99)
HCT VFR BLD CALC: 36.8 % — SIGNIFICANT CHANGE UP (ref 34.5–45)
HGB BLD-MCNC: 12 G/DL — SIGNIFICANT CHANGE UP (ref 11.5–15.5)
IMM GRANULOCYTES NFR BLD AUTO: 0.1 % — SIGNIFICANT CHANGE UP (ref 0–1.5)
LYMPHOCYTES # BLD AUTO: 1.66 K/UL — SIGNIFICANT CHANGE UP (ref 1–3.3)
LYMPHOCYTES # BLD AUTO: 23.1 % — SIGNIFICANT CHANGE UP (ref 13–44)
MCHC RBC-ENTMCNC: 29.2 PG — SIGNIFICANT CHANGE UP (ref 27–34)
MCHC RBC-ENTMCNC: 32.6 GM/DL — SIGNIFICANT CHANGE UP (ref 32–36)
MCV RBC AUTO: 89.5 FL — SIGNIFICANT CHANGE UP (ref 80–100)
MONOCYTES # BLD AUTO: 0.51 K/UL — SIGNIFICANT CHANGE UP (ref 0–0.9)
MONOCYTES NFR BLD AUTO: 7.1 % — SIGNIFICANT CHANGE UP (ref 2–14)
NEUTROPHILS # BLD AUTO: 4.89 K/UL — SIGNIFICANT CHANGE UP (ref 1.8–7.4)
NEUTROPHILS NFR BLD AUTO: 68 % — SIGNIFICANT CHANGE UP (ref 43–77)
NRBC # BLD: 0 /100 WBCS — SIGNIFICANT CHANGE UP (ref 0–0)
PLATELET # BLD AUTO: 315 K/UL — SIGNIFICANT CHANGE UP (ref 150–400)
POTASSIUM SERPL-MCNC: 4.5 MMOL/L — SIGNIFICANT CHANGE UP (ref 3.5–5.3)
POTASSIUM SERPL-SCNC: 4.5 MMOL/L — SIGNIFICANT CHANGE UP (ref 3.5–5.3)
PROT SERPL-MCNC: 7 G/DL — SIGNIFICANT CHANGE UP (ref 6–8.3)
RBC # BLD: 4.11 M/UL — SIGNIFICANT CHANGE UP (ref 3.8–5.2)
RBC # FLD: 12.6 % — SIGNIFICANT CHANGE UP (ref 10.3–14.5)
SODIUM SERPL-SCNC: 138 MMOL/L — SIGNIFICANT CHANGE UP (ref 135–145)
WBC # BLD: 7.19 K/UL — SIGNIFICANT CHANGE UP (ref 3.8–10.5)
WBC # FLD AUTO: 7.19 K/UL — SIGNIFICANT CHANGE UP (ref 3.8–10.5)

## 2019-01-01 PROCEDURE — 99283 EMERGENCY DEPT VISIT LOW MDM: CPT

## 2019-01-01 PROCEDURE — 85027 COMPLETE CBC AUTOMATED: CPT

## 2019-01-01 PROCEDURE — 36415 COLL VENOUS BLD VENIPUNCTURE: CPT

## 2019-01-01 PROCEDURE — 80053 COMPREHEN METABOLIC PANEL: CPT

## 2019-01-01 PROCEDURE — 99285 EMERGENCY DEPT VISIT HI MDM: CPT

## 2019-01-01 RX ORDER — SUCRALFATE 1 G
1 TABLET ORAL ONCE
Qty: 0 | Refills: 0 | Status: COMPLETED | OUTPATIENT
Start: 2019-01-01 | End: 2019-01-01

## 2019-01-01 RX ORDER — SODIUM CHLORIDE 9 MG/ML
1000 INJECTION INTRAMUSCULAR; INTRAVENOUS; SUBCUTANEOUS ONCE
Qty: 0 | Refills: 0 | Status: COMPLETED | OUTPATIENT
Start: 2019-01-01 | End: 2019-01-01

## 2019-01-01 RX ADMIN — Medication 1 GRAM(S): at 23:30

## 2019-01-01 RX ADMIN — SODIUM CHLORIDE 1000 MILLILITER(S): 9 INJECTION INTRAMUSCULAR; INTRAVENOUS; SUBCUTANEOUS at 23:30

## 2019-01-01 NOTE — ED PROVIDER NOTE - PROGRESS NOTE DETAILS
pt reevaluated, feeling better, cramping is slowly improving. pt to be d/c home after fluids, follow up with her GI, d/c with carafate and return if any symptoms owarpitane

## 2019-01-01 NOTE — ED PROVIDER NOTE - OBJECTIVE STATEMENT
32yo female bib ems with abd pain tonite. pt has hx of IBS and started to have diarrhea today, pt took her hycosamine twice with no releif, pt c/o diffuse upper abd cramping, no nausea or vomiting, no fever, chills, no other copmalints

## 2019-01-01 NOTE — ED ADULT NURSE NOTE - NSIMPLEMENTINTERV_GEN_ALL_ED
Implemented All Universal Safety Interventions:  [place] to call system. Call bell, personal items and telephone within reach. Instruct patient to call for assistance. Room bathroom lighting operational. Non-slip footwear when patient is off stretcher. Physically safe environment: no spills, clutter or unnecessary equipment. Stretcher in lowest position, wheels locked, appropriate side rails in place.

## 2019-01-02 RX ORDER — SUCRALFATE 1 G
10 TABLET ORAL
Qty: 90 | Refills: 0
Start: 2019-01-02 | End: 2019-01-04

## 2019-01-04 ENCOUNTER — EMERGENCY (EMERGENCY)
Facility: HOSPITAL | Age: 34
LOS: 1 days | Discharge: HOME | End: 2019-01-04
Attending: EMERGENCY MEDICINE | Admitting: EMERGENCY MEDICINE
Payer: COMMERCIAL

## 2019-01-04 VITALS
HEIGHT: 63 IN | HEART RATE: 91 BPM | DIASTOLIC BLOOD PRESSURE: 87 MMHG | SYSTOLIC BLOOD PRESSURE: 120 MMHG | RESPIRATION RATE: 16 BRPM | OXYGEN SATURATION: 100 % | TEMPERATURE: 98 F | WEIGHT: 166.01 LBS

## 2019-01-04 VITALS
SYSTOLIC BLOOD PRESSURE: 105 MMHG | OXYGEN SATURATION: 98 % | DIASTOLIC BLOOD PRESSURE: 69 MMHG | RESPIRATION RATE: 18 BRPM | HEART RATE: 85 BPM

## 2019-01-04 DIAGNOSIS — Z90.49 ACQUIRED ABSENCE OF OTHER SPECIFIED PARTS OF DIGESTIVE TRACT: Chronic | ICD-10-CM

## 2019-01-04 DIAGNOSIS — Z90.13 ACQUIRED ABSENCE OF BILATERAL BREASTS AND NIPPLES: Chronic | ICD-10-CM

## 2019-01-04 LAB
ALBUMIN SERPL ELPH-MCNC: 3.6 G/DL — SIGNIFICANT CHANGE UP (ref 3.3–5)
ALP SERPL-CCNC: 93 U/L — SIGNIFICANT CHANGE UP (ref 40–120)
ALT FLD-CCNC: 124 U/L — HIGH (ref 12–78)
ANION GAP SERPL CALC-SCNC: 9 MMOL/L — SIGNIFICANT CHANGE UP (ref 5–17)
APPEARANCE UR: CLEAR — SIGNIFICANT CHANGE UP
AST SERPL-CCNC: 27 U/L — SIGNIFICANT CHANGE UP (ref 15–37)
BASOPHILS # BLD AUTO: 0.04 K/UL — SIGNIFICANT CHANGE UP (ref 0–0.2)
BASOPHILS NFR BLD AUTO: 0.5 % — SIGNIFICANT CHANGE UP (ref 0–2)
BILIRUB SERPL-MCNC: 0.4 MG/DL — SIGNIFICANT CHANGE UP (ref 0.2–1.2)
BILIRUB UR-MCNC: NEGATIVE — SIGNIFICANT CHANGE UP
BUN SERPL-MCNC: 14 MG/DL — SIGNIFICANT CHANGE UP (ref 7–23)
CALCIUM SERPL-MCNC: 8.6 MG/DL — SIGNIFICANT CHANGE UP (ref 8.5–10.1)
CHLORIDE SERPL-SCNC: 105 MMOL/L — SIGNIFICANT CHANGE UP (ref 96–108)
CO2 SERPL-SCNC: 27 MMOL/L — SIGNIFICANT CHANGE UP (ref 22–31)
COLOR SPEC: YELLOW — SIGNIFICANT CHANGE UP
CREAT SERPL-MCNC: 0.65 MG/DL — SIGNIFICANT CHANGE UP (ref 0.5–1.3)
DIFF PNL FLD: NEGATIVE — SIGNIFICANT CHANGE UP
EOSINOPHIL # BLD AUTO: 0.04 K/UL — SIGNIFICANT CHANGE UP (ref 0–0.5)
EOSINOPHIL NFR BLD AUTO: 0.5 % — SIGNIFICANT CHANGE UP (ref 0–6)
GLUCOSE SERPL-MCNC: 88 MG/DL — SIGNIFICANT CHANGE UP (ref 70–99)
GLUCOSE UR QL: NEGATIVE — SIGNIFICANT CHANGE UP
HCG SERPL-ACNC: <1 MIU/ML — SIGNIFICANT CHANGE UP
HCT VFR BLD CALC: 36.3 % — SIGNIFICANT CHANGE UP (ref 34.5–45)
HGB BLD-MCNC: 11.8 G/DL — SIGNIFICANT CHANGE UP (ref 11.5–15.5)
IMM GRANULOCYTES NFR BLD AUTO: 0.5 % — SIGNIFICANT CHANGE UP (ref 0–1.5)
KETONES UR-MCNC: NEGATIVE — SIGNIFICANT CHANGE UP
LEUKOCYTE ESTERASE UR-ACNC: NEGATIVE — SIGNIFICANT CHANGE UP
LIDOCAIN IGE QN: 213 U/L — SIGNIFICANT CHANGE UP (ref 73–393)
LYMPHOCYTES # BLD AUTO: 1.23 K/UL — SIGNIFICANT CHANGE UP (ref 1–3.3)
LYMPHOCYTES # BLD AUTO: 14.8 % — SIGNIFICANT CHANGE UP (ref 13–44)
MAGNESIUM SERPL-MCNC: 1.9 MG/DL — SIGNIFICANT CHANGE UP (ref 1.6–2.6)
MCHC RBC-ENTMCNC: 28.9 PG — SIGNIFICANT CHANGE UP (ref 27–34)
MCHC RBC-ENTMCNC: 32.5 GM/DL — SIGNIFICANT CHANGE UP (ref 32–36)
MCV RBC AUTO: 88.8 FL — SIGNIFICANT CHANGE UP (ref 80–100)
MONOCYTES # BLD AUTO: 0.47 K/UL — SIGNIFICANT CHANGE UP (ref 0–0.9)
MONOCYTES NFR BLD AUTO: 5.6 % — SIGNIFICANT CHANGE UP (ref 2–14)
NEUTROPHILS # BLD AUTO: 6.5 K/UL — SIGNIFICANT CHANGE UP (ref 1.8–7.4)
NEUTROPHILS NFR BLD AUTO: 78.1 % — HIGH (ref 43–77)
NITRITE UR-MCNC: NEGATIVE — SIGNIFICANT CHANGE UP
PH UR: 6 — SIGNIFICANT CHANGE UP (ref 5–8)
PLATELET # BLD AUTO: 337 K/UL — SIGNIFICANT CHANGE UP (ref 150–400)
POTASSIUM SERPL-MCNC: 3.6 MMOL/L — SIGNIFICANT CHANGE UP (ref 3.5–5.3)
POTASSIUM SERPL-SCNC: 3.6 MMOL/L — SIGNIFICANT CHANGE UP (ref 3.5–5.3)
PROT SERPL-MCNC: 7.1 G/DL — SIGNIFICANT CHANGE UP (ref 6–8.3)
PROT UR-MCNC: NEGATIVE — SIGNIFICANT CHANGE UP
RBC # BLD: 4.09 M/UL — SIGNIFICANT CHANGE UP (ref 3.8–5.2)
RBC # FLD: 12.5 % — SIGNIFICANT CHANGE UP (ref 10.3–14.5)
SODIUM SERPL-SCNC: 141 MMOL/L — SIGNIFICANT CHANGE UP (ref 135–145)
SP GR SPEC: 1.02 — SIGNIFICANT CHANGE UP (ref 1.01–1.02)
UROBILINOGEN FLD QL: NEGATIVE — SIGNIFICANT CHANGE UP
WBC # BLD: 8.32 K/UL — SIGNIFICANT CHANGE UP (ref 3.8–10.5)
WBC # FLD AUTO: 8.32 K/UL — SIGNIFICANT CHANGE UP (ref 3.8–10.5)

## 2019-01-04 PROCEDURE — 83690 ASSAY OF LIPASE: CPT

## 2019-01-04 PROCEDURE — 74177 CT ABD & PELVIS W/CONTRAST: CPT

## 2019-01-04 PROCEDURE — 99284 EMERGENCY DEPT VISIT MOD MDM: CPT | Mod: 25

## 2019-01-04 PROCEDURE — 36415 COLL VENOUS BLD VENIPUNCTURE: CPT

## 2019-01-04 PROCEDURE — 83735 ASSAY OF MAGNESIUM: CPT

## 2019-01-04 PROCEDURE — 81003 URINALYSIS AUTO W/O SCOPE: CPT

## 2019-01-04 PROCEDURE — 85027 COMPLETE CBC AUTOMATED: CPT

## 2019-01-04 PROCEDURE — 96375 TX/PRO/DX INJ NEW DRUG ADDON: CPT

## 2019-01-04 PROCEDURE — 96374 THER/PROPH/DIAG INJ IV PUSH: CPT | Mod: XU

## 2019-01-04 PROCEDURE — 74177 CT ABD & PELVIS W/CONTRAST: CPT | Mod: 26

## 2019-01-04 PROCEDURE — 99284 EMERGENCY DEPT VISIT MOD MDM: CPT

## 2019-01-04 PROCEDURE — 80053 COMPREHEN METABOLIC PANEL: CPT

## 2019-01-04 PROCEDURE — 84702 CHORIONIC GONADOTROPIN TEST: CPT

## 2019-01-04 RX ORDER — SODIUM CHLORIDE 9 MG/ML
1000 INJECTION INTRAMUSCULAR; INTRAVENOUS; SUBCUTANEOUS ONCE
Qty: 0 | Refills: 0 | Status: COMPLETED | OUTPATIENT
Start: 2019-01-04 | End: 2019-01-04

## 2019-01-04 RX ORDER — METOCLOPRAMIDE HCL 10 MG
1 TABLET ORAL
Qty: 6 | Refills: 0
Start: 2019-01-04

## 2019-01-04 RX ORDER — SUCRALFATE 1 G
1 TABLET ORAL
Qty: 15 | Refills: 0
Start: 2019-01-04 | End: 2019-01-08

## 2019-01-04 RX ORDER — IOHEXOL 300 MG/ML
30 INJECTION, SOLUTION INTRAVENOUS ONCE
Qty: 0 | Refills: 0 | Status: COMPLETED | OUTPATIENT
Start: 2019-01-04 | End: 2019-01-04

## 2019-01-04 RX ORDER — METOCLOPRAMIDE HCL 10 MG
10 TABLET ORAL ONCE
Qty: 0 | Refills: 0 | Status: COMPLETED | OUTPATIENT
Start: 2019-01-04 | End: 2019-01-04

## 2019-01-04 RX ORDER — FAMOTIDINE 10 MG/ML
20 INJECTION INTRAVENOUS ONCE
Qty: 0 | Refills: 0 | Status: COMPLETED | OUTPATIENT
Start: 2019-01-04 | End: 2019-01-04

## 2019-01-04 RX ADMIN — SODIUM CHLORIDE 1000 MILLILITER(S): 9 INJECTION INTRAMUSCULAR; INTRAVENOUS; SUBCUTANEOUS at 18:07

## 2019-01-04 RX ADMIN — Medication 1 MILLIGRAM(S): at 19:49

## 2019-01-04 RX ADMIN — FAMOTIDINE 20 MILLIGRAM(S): 10 INJECTION INTRAVENOUS at 18:06

## 2019-01-04 RX ADMIN — Medication 10 MILLIGRAM(S): at 18:06

## 2019-01-04 RX ADMIN — SODIUM CHLORIDE 1000 MILLILITER(S): 9 INJECTION INTRAMUSCULAR; INTRAVENOUS; SUBCUTANEOUS at 19:36

## 2019-01-04 RX ADMIN — IOHEXOL 30 MILLILITER(S): 300 INJECTION, SOLUTION INTRAVENOUS at 17:45

## 2019-01-04 NOTE — ED PROVIDER NOTE - ATTENDING CONTRIBUTION TO CARE
Pt is a 34 yo female who presents to the ED with a cc of abdominal pain.  PMHx of Anxiety, cholelithiases, h/o IBS.    Pt reports that she has been worked up extensively for her IBS and that she follows with a specialist in the city.  Pt reports that he has not felt like herself in over a year but that symptoms significantly worsened over the last several days.  She reports that she has been experiencing severe upper abdominal pain.  Pt also reports associated chills, nausea, and diarrhea.  Denies fever, V/C, CP, SOB, ext numbness or weakness.  On exam pt lying in bed crying and visibly anxious.  NCAT, PERRL, EOMI, heart RRR, lungs CTA, abd soft with diffuse TTP generalized to all quadrants.  Will obtain screening abdominal labs, CT abd/pelvis and medicate for symptoms.  Agree with above plan of care

## 2019-01-04 NOTE — ED ADULT NURSE NOTE - NSIMPLEMENTINTERV_GEN_ALL_ED
Implemented All Universal Safety Interventions:  [place] to call system. Call bell, personal items and telephone within reach. Instruct patient to call for assistance. Room bathroom lighting operational. Non-slip footwear when patient is off stretcher. Physically safe environment: no spills, clutter or unnecessary equipment. Stretcher in lowest position, wheels locked, appropriate side rails in place.

## 2019-01-04 NOTE — ED PROVIDER NOTE - CHPI ED SYMPTOMS NEG
no chills/no dysuria/no fever/no blood in stool/no vomiting/no burning urination/no abdominal distension

## 2019-01-04 NOTE — ED ADULT NURSE NOTE - OBJECTIVE STATEMENT
Pt received in bed alert and oriented with N/V/D and abd pain. Pt states that pain started on and off x 3 days ago. Pt was newly dx with IBS As per Md's orders IV harlan placed blood specimen obtained and sent to the lab. Meds given and tolerated well. Pt now prepped for ct scan. Nursing care ongoing and safety maintained.       abd pain, diarrhea & nausea x on/off x 3 days..seen here tuesday dx IBS was instructed to f/u w/ GI

## 2019-01-04 NOTE — ED PROVIDER NOTE - GASTROINTESTINAL, MLM
Abdomen soft, (+) epigastric TTP without guarding or rebound TTP, no discolorations, negative CVA TTP.

## 2019-01-04 NOTE — ED PROVIDER NOTE - PROGRESS NOTE DETAILS
pt notes improvement of symptoms. Discussed with patient Gi Dr. Gabriele templeton, no acute intervention requested, notes he worked her up extensively. Dx IBS, notes no acute medication needed, notes he will see her in office. pt advised of symptoms to be cautious of warranting acute return, pt advised of importance to follow up immediately.

## 2019-01-04 NOTE — ED PROVIDER NOTE - MEDICAL DECISION MAKING DETAILS
32 y/o female with PMHx IBS and anxiety presenting today c/o epigastric abdominal pain x 3 days. pt notes she was seen in ER 2-3 days ago for same complaint. Pt notes she follows with GI Dr. Gabriele Enamorado. pt describes pain as "spasms" and currently rating 7/10. PSHx cholecystectomy.  Plan includes labs, CT, IVF, reglan, pepcid, re-assess.

## 2019-01-04 NOTE — ED PROVIDER NOTE - OBJECTIVE STATEMENT
34 y/o female with PMHx IBS and anxiety presenting today c/o epigastric abdominal pain x 3 days. pt notes she was seen in ER 2-3 days ago for same complaint. Pt notes she follows with GI Dr. Gabriele Enamorado. pt describes pain as "spasms" and currently rating 7/10. PSHx cholecystectomy. pt admits to diarrhea. pt denies bloody stool, fever, melena, chest pain, SOB, vomiting, or any other complaints.

## 2019-01-15 ENCOUNTER — EMERGENCY (EMERGENCY)
Facility: HOSPITAL | Age: 34
LOS: 1 days | Discharge: HOME | End: 2019-01-15
Attending: EMERGENCY MEDICINE | Admitting: EMERGENCY MEDICINE
Payer: COMMERCIAL

## 2019-01-15 VITALS
DIASTOLIC BLOOD PRESSURE: 85 MMHG | TEMPERATURE: 99 F | OXYGEN SATURATION: 100 % | WEIGHT: 169.09 LBS | HEART RATE: 129 BPM | SYSTOLIC BLOOD PRESSURE: 120 MMHG | RESPIRATION RATE: 20 BRPM | HEIGHT: 63 IN

## 2019-01-15 VITALS
DIASTOLIC BLOOD PRESSURE: 83 MMHG | SYSTOLIC BLOOD PRESSURE: 119 MMHG | OXYGEN SATURATION: 95 % | HEART RATE: 84 BPM | TEMPERATURE: 98 F | RESPIRATION RATE: 18 BRPM

## 2019-01-15 DIAGNOSIS — Z90.13 ACQUIRED ABSENCE OF BILATERAL BREASTS AND NIPPLES: Chronic | ICD-10-CM

## 2019-01-15 DIAGNOSIS — Z90.49 ACQUIRED ABSENCE OF OTHER SPECIFIED PARTS OF DIGESTIVE TRACT: Chronic | ICD-10-CM

## 2019-01-15 LAB
ALBUMIN SERPL ELPH-MCNC: 4.1 G/DL — SIGNIFICANT CHANGE UP (ref 3.3–5)
ALP SERPL-CCNC: 65 U/L — SIGNIFICANT CHANGE UP (ref 40–120)
ALT FLD-CCNC: 23 U/L — SIGNIFICANT CHANGE UP (ref 12–78)
ANION GAP SERPL CALC-SCNC: 10 MMOL/L — SIGNIFICANT CHANGE UP (ref 5–17)
AST SERPL-CCNC: 12 U/L — LOW (ref 15–37)
BASOPHILS # BLD AUTO: 0.07 K/UL — SIGNIFICANT CHANGE UP (ref 0–0.2)
BASOPHILS NFR BLD AUTO: 0.5 % — SIGNIFICANT CHANGE UP (ref 0–2)
BILIRUB SERPL-MCNC: 0.4 MG/DL — SIGNIFICANT CHANGE UP (ref 0.2–1.2)
BUN SERPL-MCNC: 17 MG/DL — SIGNIFICANT CHANGE UP (ref 7–23)
CALCIUM SERPL-MCNC: 9.1 MG/DL — SIGNIFICANT CHANGE UP (ref 8.5–10.1)
CHLORIDE SERPL-SCNC: 106 MMOL/L — SIGNIFICANT CHANGE UP (ref 96–108)
CO2 SERPL-SCNC: 23 MMOL/L — SIGNIFICANT CHANGE UP (ref 22–31)
CREAT SERPL-MCNC: 0.72 MG/DL — SIGNIFICANT CHANGE UP (ref 0.5–1.3)
EOSINOPHIL # BLD AUTO: 0.04 K/UL — SIGNIFICANT CHANGE UP (ref 0–0.5)
EOSINOPHIL NFR BLD AUTO: 0.3 % — SIGNIFICANT CHANGE UP (ref 0–6)
GLUCOSE SERPL-MCNC: 94 MG/DL — SIGNIFICANT CHANGE UP (ref 70–99)
HCG SERPL-ACNC: <1 MIU/ML — SIGNIFICANT CHANGE UP
HCT VFR BLD CALC: 39.4 % — SIGNIFICANT CHANGE UP (ref 34.5–45)
HGB BLD-MCNC: 12.6 G/DL — SIGNIFICANT CHANGE UP (ref 11.5–15.5)
IMM GRANULOCYTES NFR BLD AUTO: 0.4 % — SIGNIFICANT CHANGE UP (ref 0–1.5)
LIDOCAIN IGE QN: 200 U/L — SIGNIFICANT CHANGE UP (ref 73–393)
LYMPHOCYTES # BLD AUTO: 1.26 K/UL — SIGNIFICANT CHANGE UP (ref 1–3.3)
LYMPHOCYTES # BLD AUTO: 9.2 % — LOW (ref 13–44)
MCHC RBC-ENTMCNC: 28.1 PG — SIGNIFICANT CHANGE UP (ref 27–34)
MCHC RBC-ENTMCNC: 32 GM/DL — SIGNIFICANT CHANGE UP (ref 32–36)
MCV RBC AUTO: 87.9 FL — SIGNIFICANT CHANGE UP (ref 80–100)
MONOCYTES # BLD AUTO: 0.56 K/UL — SIGNIFICANT CHANGE UP (ref 0–0.9)
MONOCYTES NFR BLD AUTO: 4.1 % — SIGNIFICANT CHANGE UP (ref 2–14)
NEUTROPHILS # BLD AUTO: 11.71 K/UL — HIGH (ref 1.8–7.4)
NEUTROPHILS NFR BLD AUTO: 85.5 % — HIGH (ref 43–77)
NRBC # BLD: 0 /100 WBCS — SIGNIFICANT CHANGE UP (ref 0–0)
PLATELET # BLD AUTO: 368 K/UL — SIGNIFICANT CHANGE UP (ref 150–400)
POTASSIUM SERPL-MCNC: 4.1 MMOL/L — SIGNIFICANT CHANGE UP (ref 3.5–5.3)
POTASSIUM SERPL-SCNC: 4.1 MMOL/L — SIGNIFICANT CHANGE UP (ref 3.5–5.3)
PROT SERPL-MCNC: 7.5 G/DL — SIGNIFICANT CHANGE UP (ref 6–8.3)
RBC # BLD: 4.48 M/UL — SIGNIFICANT CHANGE UP (ref 3.8–5.2)
RBC # FLD: 12.5 % — SIGNIFICANT CHANGE UP (ref 10.3–14.5)
SODIUM SERPL-SCNC: 139 MMOL/L — SIGNIFICANT CHANGE UP (ref 135–145)
WBC # BLD: 13.7 K/UL — HIGH (ref 3.8–10.5)
WBC # FLD AUTO: 13.7 K/UL — HIGH (ref 3.8–10.5)

## 2019-01-15 PROCEDURE — 96375 TX/PRO/DX INJ NEW DRUG ADDON: CPT

## 2019-01-15 PROCEDURE — 36415 COLL VENOUS BLD VENIPUNCTURE: CPT

## 2019-01-15 PROCEDURE — 80053 COMPREHEN METABOLIC PANEL: CPT

## 2019-01-15 PROCEDURE — 99284 EMERGENCY DEPT VISIT MOD MDM: CPT | Mod: 25

## 2019-01-15 PROCEDURE — 85027 COMPLETE CBC AUTOMATED: CPT

## 2019-01-15 PROCEDURE — 96374 THER/PROPH/DIAG INJ IV PUSH: CPT

## 2019-01-15 PROCEDURE — 83690 ASSAY OF LIPASE: CPT

## 2019-01-15 PROCEDURE — 99285 EMERGENCY DEPT VISIT HI MDM: CPT

## 2019-01-15 PROCEDURE — 84702 CHORIONIC GONADOTROPIN TEST: CPT

## 2019-01-15 RX ORDER — AMITRIPTYLINE HCL 25 MG
0 TABLET ORAL
Qty: 0 | Refills: 0 | COMMUNITY

## 2019-01-15 RX ORDER — METOCLOPRAMIDE HCL 10 MG
10 TABLET ORAL ONCE
Qty: 0 | Refills: 0 | Status: COMPLETED | OUTPATIENT
Start: 2019-01-15 | End: 2019-01-15

## 2019-01-15 RX ORDER — DIPHENHYDRAMINE HCL 50 MG
25 CAPSULE ORAL ONCE
Qty: 0 | Refills: 0 | Status: COMPLETED | OUTPATIENT
Start: 2019-01-15 | End: 2019-01-15

## 2019-01-15 RX ORDER — DIPHENHYDRAMINE HCL 50 MG
50 CAPSULE ORAL ONCE
Qty: 0 | Refills: 0 | Status: DISCONTINUED | OUTPATIENT
Start: 2019-01-15 | End: 2019-01-15

## 2019-01-15 RX ORDER — HYOSCYAMINE SULFATE 0.13 MG
0 TABLET ORAL
Qty: 0 | Refills: 0 | COMMUNITY

## 2019-01-15 RX ORDER — SODIUM CHLORIDE 9 MG/ML
1000 INJECTION INTRAMUSCULAR; INTRAVENOUS; SUBCUTANEOUS ONCE
Qty: 0 | Refills: 0 | Status: COMPLETED | OUTPATIENT
Start: 2019-01-15 | End: 2019-01-15

## 2019-01-15 RX ADMIN — Medication 25 MILLIGRAM(S): at 06:17

## 2019-01-15 RX ADMIN — SODIUM CHLORIDE 1000 MILLILITER(S): 9 INJECTION INTRAMUSCULAR; INTRAVENOUS; SUBCUTANEOUS at 07:20

## 2019-01-15 RX ADMIN — Medication 1 MILLIGRAM(S): at 06:46

## 2019-01-15 RX ADMIN — SODIUM CHLORIDE 1000 MILLILITER(S): 9 INJECTION INTRAMUSCULAR; INTRAVENOUS; SUBCUTANEOUS at 06:17

## 2019-01-15 RX ADMIN — Medication 10 MILLIGRAM(S): at 06:17

## 2019-01-15 NOTE — ED PROVIDER NOTE - OBJECTIVE STATEMENT
32yo female bib ems with nausea and ovmiting since lat nite. pt states he was feleing nauseous all day, and since last nite she has been vomiting, she took her zofran twice with no relief, pt also had constipation and used fleet enema with relief. no other compalints

## 2019-01-15 NOTE — ED PROVIDER NOTE - PROGRESS NOTE DETAILS
pt no longer vomiting, but still feeling nauseous, will give carafate and reeval pt reevaluted, feeling better, got up to go to the bathroom, follow up with GI, return if any symtpoms worsen

## 2019-01-15 NOTE — ED ADULT NURSE NOTE - OBJECTIVE STATEMENT
Pt. complaining of nausea and vomiting x 1 day. Pt. reported feeling nausea when taking Klonopin in the am followed by multiple episodes of emesis. Pt. reported history of IBS with recent change in medications.

## 2019-01-15 NOTE — ED ADULT NURSE NOTE - NSIMPLEMENTINTERV_GEN_ALL_ED
Implemented All Universal Safety Interventions:  [place] to call system. Call bell, personal items and telephone within reach. Instruct patient to call for assistance. Room bathroom lighting operational. Non-slip footwear when patient is off stretcher. Physically safe environment: no spills, clutter or unnecessary equipment. Stretcher in lowest position, wheels locked, appropriate side rails in place.

## 2019-02-01 ENCOUNTER — RESULT REVIEW (OUTPATIENT)
Age: 34
End: 2019-02-01

## 2019-02-01 ENCOUNTER — OUTPATIENT (OUTPATIENT)
Dept: OUTPATIENT SERVICES | Facility: HOSPITAL | Age: 34
LOS: 1 days | End: 2019-02-01
Payer: COMMERCIAL

## 2019-02-01 DIAGNOSIS — Z90.49 ACQUIRED ABSENCE OF OTHER SPECIFIED PARTS OF DIGESTIVE TRACT: Chronic | ICD-10-CM

## 2019-02-01 DIAGNOSIS — Z90.13 ACQUIRED ABSENCE OF BILATERAL BREASTS AND NIPPLES: Chronic | ICD-10-CM

## 2019-02-01 DIAGNOSIS — R10.13 EPIGASTRIC PAIN: ICD-10-CM

## 2019-02-01 PROCEDURE — 88312 SPECIAL STAINS GROUP 1: CPT | Mod: 26

## 2019-02-01 PROCEDURE — 88313 SPECIAL STAINS GROUP 2: CPT

## 2019-02-01 PROCEDURE — 88313 SPECIAL STAINS GROUP 2: CPT | Mod: 26

## 2019-02-01 PROCEDURE — 88305 TISSUE EXAM BY PATHOLOGIST: CPT | Mod: 26

## 2019-02-01 PROCEDURE — 43239 EGD BIOPSY SINGLE/MULTIPLE: CPT

## 2019-02-01 PROCEDURE — 88312 SPECIAL STAINS GROUP 1: CPT

## 2019-02-01 PROCEDURE — 88305 TISSUE EXAM BY PATHOLOGIST: CPT

## 2019-02-05 LAB — SURGICAL PATHOLOGY STUDY: SIGNIFICANT CHANGE UP

## 2019-05-28 ENCOUNTER — TRANSCRIPTION ENCOUNTER (OUTPATIENT)
Age: 34
End: 2019-05-28

## 2019-05-28 ENCOUNTER — INPATIENT (INPATIENT)
Facility: HOSPITAL | Age: 34
LOS: 3 days | Discharge: HOME | DRG: 661 | End: 2019-06-01
Attending: STUDENT IN AN ORGANIZED HEALTH CARE EDUCATION/TRAINING PROGRAM | Admitting: INTERNAL MEDICINE
Payer: COMMERCIAL

## 2019-05-28 VITALS
SYSTOLIC BLOOD PRESSURE: 129 MMHG | OXYGEN SATURATION: 100 % | RESPIRATION RATE: 20 BRPM | HEART RATE: 101 BPM | TEMPERATURE: 98 F | WEIGHT: 167.99 LBS | DIASTOLIC BLOOD PRESSURE: 94 MMHG

## 2019-05-28 DIAGNOSIS — Z90.49 ACQUIRED ABSENCE OF OTHER SPECIFIED PARTS OF DIGESTIVE TRACT: Chronic | ICD-10-CM

## 2019-05-28 DIAGNOSIS — N20.0 CALCULUS OF KIDNEY: ICD-10-CM

## 2019-05-28 DIAGNOSIS — N20.1 CALCULUS OF URETER: ICD-10-CM

## 2019-05-28 DIAGNOSIS — Z90.13 ACQUIRED ABSENCE OF BILATERAL BREASTS AND NIPPLES: Chronic | ICD-10-CM

## 2019-05-28 LAB
ALBUMIN SERPL ELPH-MCNC: 4.2 G/DL — SIGNIFICANT CHANGE UP (ref 3.3–5)
ALP SERPL-CCNC: 54 U/L — SIGNIFICANT CHANGE UP (ref 40–120)
ALT FLD-CCNC: 28 U/L — SIGNIFICANT CHANGE UP (ref 12–78)
ANION GAP SERPL CALC-SCNC: 6 MMOL/L — SIGNIFICANT CHANGE UP (ref 5–17)
APPEARANCE UR: CLEAR — SIGNIFICANT CHANGE UP
APTT BLD: 28.7 SEC — SIGNIFICANT CHANGE UP (ref 27.5–36.3)
AST SERPL-CCNC: 16 U/L — SIGNIFICANT CHANGE UP (ref 15–37)
BACTERIA # UR AUTO: ABNORMAL
BASOPHILS # BLD AUTO: 0.07 K/UL — SIGNIFICANT CHANGE UP (ref 0–0.2)
BASOPHILS NFR BLD AUTO: 1 % — SIGNIFICANT CHANGE UP (ref 0–2)
BILIRUB SERPL-MCNC: 0.3 MG/DL — SIGNIFICANT CHANGE UP (ref 0.2–1.2)
BILIRUB UR-MCNC: ABNORMAL
BUN SERPL-MCNC: 15 MG/DL — SIGNIFICANT CHANGE UP (ref 7–23)
CALCIUM SERPL-MCNC: 8.6 MG/DL — SIGNIFICANT CHANGE UP (ref 8.5–10.1)
CHLORIDE SERPL-SCNC: 105 MMOL/L — SIGNIFICANT CHANGE UP (ref 96–108)
CO2 SERPL-SCNC: 28 MMOL/L — SIGNIFICANT CHANGE UP (ref 22–31)
COLOR SPEC: YELLOW — SIGNIFICANT CHANGE UP
COMMENT - URINE: SIGNIFICANT CHANGE UP
CREAT SERPL-MCNC: 0.78 MG/DL — SIGNIFICANT CHANGE UP (ref 0.5–1.3)
DIFF PNL FLD: ABNORMAL
EOSINOPHIL # BLD AUTO: 0.09 K/UL — SIGNIFICANT CHANGE UP (ref 0–0.5)
EOSINOPHIL NFR BLD AUTO: 1.3 % — SIGNIFICANT CHANGE UP (ref 0–6)
EPI CELLS # UR: SIGNIFICANT CHANGE UP
GLUCOSE SERPL-MCNC: 96 MG/DL — SIGNIFICANT CHANGE UP (ref 70–99)
GLUCOSE UR QL: NEGATIVE — SIGNIFICANT CHANGE UP
HCG SERPL-ACNC: <1 MIU/ML — SIGNIFICANT CHANGE UP
HCT VFR BLD CALC: 38.7 % — SIGNIFICANT CHANGE UP (ref 34.5–45)
HGB BLD-MCNC: 12.7 G/DL — SIGNIFICANT CHANGE UP (ref 11.5–15.5)
IMM GRANULOCYTES NFR BLD AUTO: 0.6 % — SIGNIFICANT CHANGE UP (ref 0–1.5)
INR BLD: 0.97 RATIO — SIGNIFICANT CHANGE UP (ref 0.88–1.16)
KETONES UR-MCNC: ABNORMAL
LACTATE SERPL-SCNC: 1.3 MMOL/L — SIGNIFICANT CHANGE UP (ref 0.7–2)
LEUKOCYTE ESTERASE UR-ACNC: ABNORMAL
LIDOCAIN IGE QN: 204 U/L — SIGNIFICANT CHANGE UP (ref 73–393)
LYMPHOCYTES # BLD AUTO: 1.63 K/UL — SIGNIFICANT CHANGE UP (ref 1–3.3)
LYMPHOCYTES # BLD AUTO: 22.8 % — SIGNIFICANT CHANGE UP (ref 13–44)
MCHC RBC-ENTMCNC: 29.1 PG — SIGNIFICANT CHANGE UP (ref 27–34)
MCHC RBC-ENTMCNC: 32.8 GM/DL — SIGNIFICANT CHANGE UP (ref 32–36)
MCV RBC AUTO: 88.6 FL — SIGNIFICANT CHANGE UP (ref 80–100)
MONOCYTES # BLD AUTO: 0.47 K/UL — SIGNIFICANT CHANGE UP (ref 0–0.9)
MONOCYTES NFR BLD AUTO: 6.6 % — SIGNIFICANT CHANGE UP (ref 2–14)
NEUTROPHILS # BLD AUTO: 4.86 K/UL — SIGNIFICANT CHANGE UP (ref 1.8–7.4)
NEUTROPHILS NFR BLD AUTO: 67.7 % — SIGNIFICANT CHANGE UP (ref 43–77)
NITRITE UR-MCNC: NEGATIVE — SIGNIFICANT CHANGE UP
NRBC # BLD: 0 /100 WBCS — SIGNIFICANT CHANGE UP (ref 0–0)
PH UR: 5 — SIGNIFICANT CHANGE UP (ref 5–8)
PLATELET # BLD AUTO: 352 K/UL — SIGNIFICANT CHANGE UP (ref 150–400)
POTASSIUM SERPL-MCNC: 3.9 MMOL/L — SIGNIFICANT CHANGE UP (ref 3.5–5.3)
POTASSIUM SERPL-SCNC: 3.9 MMOL/L — SIGNIFICANT CHANGE UP (ref 3.5–5.3)
PROT SERPL-MCNC: 7.3 G/DL — SIGNIFICANT CHANGE UP (ref 6–8.3)
PROT UR-MCNC: 25 MG/DL
PROTHROM AB SERPL-ACNC: 11 SEC — SIGNIFICANT CHANGE UP (ref 10–12.9)
RBC # BLD: 4.37 M/UL — SIGNIFICANT CHANGE UP (ref 3.8–5.2)
RBC # FLD: 12.9 % — SIGNIFICANT CHANGE UP (ref 10.3–14.5)
RBC CASTS # UR COMP ASSIST: >50 /HPF (ref 0–4)
SODIUM SERPL-SCNC: 139 MMOL/L — SIGNIFICANT CHANGE UP (ref 135–145)
SP GR SPEC: 1.01 — SIGNIFICANT CHANGE UP (ref 1.01–1.02)
UROBILINOGEN FLD QL: NEGATIVE — SIGNIFICANT CHANGE UP
WBC # BLD: 7.16 K/UL — SIGNIFICANT CHANGE UP (ref 3.8–10.5)
WBC # FLD AUTO: 7.16 K/UL — SIGNIFICANT CHANGE UP (ref 3.8–10.5)
WBC UR QL: SIGNIFICANT CHANGE UP

## 2019-05-28 PROCEDURE — 99285 EMERGENCY DEPT VISIT HI MDM: CPT

## 2019-05-28 PROCEDURE — 74176 CT ABD & PELVIS W/O CONTRAST: CPT | Mod: 26

## 2019-05-28 PROCEDURE — 99223 1ST HOSP IP/OBS HIGH 75: CPT | Mod: AI

## 2019-05-28 RX ORDER — CLONAZEPAM 1 MG
1 TABLET ORAL DAILY
Refills: 0 | Status: DISCONTINUED | OUTPATIENT
Start: 2019-05-28 | End: 2019-05-29

## 2019-05-28 RX ORDER — MORPHINE SULFATE 50 MG/1
4 CAPSULE, EXTENDED RELEASE ORAL ONCE
Refills: 0 | Status: DISCONTINUED | OUTPATIENT
Start: 2019-05-28 | End: 2019-05-28

## 2019-05-28 RX ORDER — ENOXAPARIN SODIUM 100 MG/ML
40 INJECTION SUBCUTANEOUS DAILY
Refills: 0 | Status: DISCONTINUED | OUTPATIENT
Start: 2019-05-28 | End: 2019-05-29

## 2019-05-28 RX ORDER — FAMOTIDINE 10 MG/ML
20 INJECTION INTRAVENOUS
Refills: 0 | Status: DISCONTINUED | OUTPATIENT
Start: 2019-05-28 | End: 2019-05-29

## 2019-05-28 RX ORDER — ONDANSETRON 8 MG/1
4 TABLET, FILM COATED ORAL EVERY 6 HOURS
Refills: 0 | Status: DISCONTINUED | OUTPATIENT
Start: 2019-05-28 | End: 2019-05-29

## 2019-05-28 RX ORDER — AMITRIPTYLINE HCL 25 MG
1 TABLET ORAL
Qty: 0 | Refills: 0 | DISCHARGE

## 2019-05-28 RX ORDER — KETOROLAC TROMETHAMINE 30 MG/ML
30 SYRINGE (ML) INJECTION EVERY 6 HOURS
Refills: 0 | Status: DISCONTINUED | OUTPATIENT
Start: 2019-05-28 | End: 2019-05-29

## 2019-05-28 RX ORDER — ONDANSETRON 8 MG/1
4 TABLET, FILM COATED ORAL ONCE
Refills: 0 | Status: COMPLETED | OUTPATIENT
Start: 2019-05-28 | End: 2019-05-28

## 2019-05-28 RX ORDER — ESCITALOPRAM OXALATE 10 MG/1
35 TABLET, FILM COATED ORAL
Qty: 0 | Refills: 0 | DISCHARGE

## 2019-05-28 RX ORDER — MORPHINE SULFATE 50 MG/1
1 CAPSULE, EXTENDED RELEASE ORAL EVERY 6 HOURS
Refills: 0 | Status: DISCONTINUED | OUTPATIENT
Start: 2019-05-28 | End: 2019-05-28

## 2019-05-28 RX ORDER — LANOLIN ALCOHOL/MO/W.PET/CERES
5 CREAM (GRAM) TOPICAL AT BEDTIME
Refills: 0 | Status: DISCONTINUED | OUTPATIENT
Start: 2019-05-28 | End: 2019-05-29

## 2019-05-28 RX ORDER — KETOROLAC TROMETHAMINE 30 MG/ML
15 SYRINGE (ML) INJECTION EVERY 6 HOURS
Refills: 0 | Status: DISCONTINUED | OUTPATIENT
Start: 2019-05-28 | End: 2019-05-29

## 2019-05-28 RX ORDER — LAMOTRIGINE 25 MG/1
0 TABLET, ORALLY DISINTEGRATING ORAL
Qty: 0 | Refills: 0 | DISCHARGE

## 2019-05-28 RX ORDER — TAMSULOSIN HYDROCHLORIDE 0.4 MG/1
0.4 CAPSULE ORAL ONCE
Refills: 0 | Status: COMPLETED | OUTPATIENT
Start: 2019-05-28 | End: 2019-05-28

## 2019-05-28 RX ORDER — MORPHINE SULFATE 50 MG/1
2 CAPSULE, EXTENDED RELEASE ORAL EVERY 6 HOURS
Refills: 0 | Status: DISCONTINUED | OUTPATIENT
Start: 2019-05-28 | End: 2019-05-28

## 2019-05-28 RX ORDER — SODIUM CHLORIDE 9 MG/ML
1000 INJECTION INTRAMUSCULAR; INTRAVENOUS; SUBCUTANEOUS
Refills: 0 | Status: DISCONTINUED | OUTPATIENT
Start: 2019-05-28 | End: 2019-05-29

## 2019-05-28 RX ORDER — ESCITALOPRAM OXALATE 10 MG/1
20 TABLET, FILM COATED ORAL DAILY
Refills: 0 | Status: DISCONTINUED | OUTPATIENT
Start: 2019-05-28 | End: 2019-05-29

## 2019-05-28 RX ORDER — COLESTIPOL HCL 5 G
0 GRANULES (GRAM) ORAL
Qty: 0 | Refills: 0 | DISCHARGE

## 2019-05-28 RX ORDER — FAMOTIDINE 10 MG/ML
20 INJECTION INTRAVENOUS DAILY
Refills: 0 | Status: DISCONTINUED | OUTPATIENT
Start: 2019-05-28 | End: 2019-05-28

## 2019-05-28 RX ORDER — HYOSCYAMINE SULFATE 0.13 MG
1 TABLET ORAL
Qty: 0 | Refills: 0 | DISCHARGE

## 2019-05-28 RX ORDER — SODIUM CHLORIDE 9 MG/ML
1000 INJECTION INTRAMUSCULAR; INTRAVENOUS; SUBCUTANEOUS ONCE
Refills: 0 | Status: COMPLETED | OUTPATIENT
Start: 2019-05-28 | End: 2019-05-28

## 2019-05-28 RX ORDER — ESCITALOPRAM OXALATE 10 MG/1
0 TABLET, FILM COATED ORAL
Qty: 0 | Refills: 0 | DISCHARGE

## 2019-05-28 RX ORDER — CEFTRIAXONE 500 MG/1
1 INJECTION, POWDER, FOR SOLUTION INTRAMUSCULAR; INTRAVENOUS EVERY 24 HOURS
Refills: 0 | Status: DISCONTINUED | OUTPATIENT
Start: 2019-05-28 | End: 2019-05-29

## 2019-05-28 RX ORDER — PANTOPRAZOLE SODIUM 20 MG/1
40 TABLET, DELAYED RELEASE ORAL DAILY
Refills: 0 | Status: DISCONTINUED | OUTPATIENT
Start: 2019-05-28 | End: 2019-05-28

## 2019-05-28 RX ORDER — CEFTRIAXONE 500 MG/1
1 INJECTION, POWDER, FOR SOLUTION INTRAMUSCULAR; INTRAVENOUS ONCE
Refills: 0 | Status: COMPLETED | OUTPATIENT
Start: 2019-05-28 | End: 2019-05-28

## 2019-05-28 RX ADMIN — FAMOTIDINE 20 MILLIGRAM(S): 10 INJECTION INTRAVENOUS at 15:39

## 2019-05-28 RX ADMIN — Medication 1 MILLIGRAM(S): at 23:53

## 2019-05-28 RX ADMIN — Medication 1 MILLIGRAM(S): at 18:36

## 2019-05-28 RX ADMIN — ONDANSETRON 4 MILLIGRAM(S): 8 TABLET, FILM COATED ORAL at 12:29

## 2019-05-28 RX ADMIN — Medication 5 MILLIGRAM(S): at 22:37

## 2019-05-28 RX ADMIN — FAMOTIDINE 20 MILLIGRAM(S): 10 INJECTION INTRAVENOUS at 22:35

## 2019-05-28 RX ADMIN — Medication 1 MILLIGRAM(S): at 15:34

## 2019-05-28 RX ADMIN — ONDANSETRON 4 MILLIGRAM(S): 8 TABLET, FILM COATED ORAL at 15:01

## 2019-05-28 RX ADMIN — SODIUM CHLORIDE 1000 MILLILITER(S): 9 INJECTION INTRAMUSCULAR; INTRAVENOUS; SUBCUTANEOUS at 08:51

## 2019-05-28 RX ADMIN — ONDANSETRON 4 MILLIGRAM(S): 8 TABLET, FILM COATED ORAL at 21:17

## 2019-05-28 RX ADMIN — ONDANSETRON 4 MILLIGRAM(S): 8 TABLET, FILM COATED ORAL at 08:49

## 2019-05-28 RX ADMIN — CEFTRIAXONE 1 GRAM(S): 500 INJECTION, POWDER, FOR SOLUTION INTRAMUSCULAR; INTRAVENOUS at 13:15

## 2019-05-28 RX ADMIN — MORPHINE SULFATE 4 MILLIGRAM(S): 50 CAPSULE, EXTENDED RELEASE ORAL at 10:55

## 2019-05-28 RX ADMIN — SODIUM CHLORIDE 150 MILLILITER(S): 9 INJECTION INTRAMUSCULAR; INTRAVENOUS; SUBCUTANEOUS at 15:37

## 2019-05-28 RX ADMIN — ESCITALOPRAM OXALATE 20 MILLIGRAM(S): 10 TABLET, FILM COATED ORAL at 23:53

## 2019-05-28 RX ADMIN — MORPHINE SULFATE 4 MILLIGRAM(S): 50 CAPSULE, EXTENDED RELEASE ORAL at 10:30

## 2019-05-28 RX ADMIN — SODIUM CHLORIDE 1000 MILLILITER(S): 9 INJECTION INTRAMUSCULAR; INTRAVENOUS; SUBCUTANEOUS at 09:54

## 2019-05-28 RX ADMIN — CEFTRIAXONE 100 GRAM(S): 500 INJECTION, POWDER, FOR SOLUTION INTRAMUSCULAR; INTRAVENOUS at 12:30

## 2019-05-28 RX ADMIN — TAMSULOSIN HYDROCHLORIDE 0.4 MILLIGRAM(S): 0.4 CAPSULE ORAL at 10:56

## 2019-05-28 NOTE — H&P ADULT - NSHPLABSRESULTS_GEN_ALL_CORE
LABS:                        12.7   7.16  )-----------( 352      ( 28 May 2019 08:49 )             38.7         139  |  105  |  15  ----------------------------<  96  3.9   |  28  |  0.78    Ca    8.6      28 May 2019 08:49    TPro  7.3  /  Alb  4.2  /  TBili  0.3  /  DBili  x   /  AST  16  /  ALT  28  /  AlkPhos  54      PT/INR - ( 28 May 2019 08:49 )   PT: 11.0 sec;   INR: 0.97 ratio         PTT - ( 28 May 2019 08:49 )  PTT:28.7 sec  Urinalysis Basic - ( 28 May 2019 09:30 )    Color: Yellow / Appearance: Clear / S.015 / pH: x  Gluc: x / Ketone: Trace  / Bili: Small / Urobili: Negative   Blood: x / Protein: 25 mg/dL / Nitrite: Negative   Leuk Esterase: Trace / RBC: >50 /HPF / WBC 0-2   Sq Epi: x / Non Sq Epi: Few / Bacteria: Occasional    < from: CT Renal Stone Hunt (19 @ 09:53) >      EXAM:  CT RENAL STONE HUNT                            PROCEDURE DATE:  2019          INTERPRETATION:  CLINICAL INFORMATION: Left flank and groin pain.    COMPARISON: CT scan abdomen pelvis 2019.    PROCEDURE:   CT of the Abdomen and Pelvis was performed without intravenous contrast.   Intravenous contrast: None.  Oral contrast: None.  Sagittal and coronal reformats were performed.    FINDINGS:    LOWER CHEST: Within normal limits.    The evaluation of the solid organ parenchyma is limited without   intravenous contrast.    LIVER: Within normal limits.  BILE DUCTS: Normal caliber.  GALLBLADDER: Within normal limits.  SPLEEN: Within normal limits.  PANCREAS: Within normal limits.  ADRENALS: Within normal limits.  KIDNEYS/URETERS:   There is mild to moderate left-sided hydroureteronephrosis secondary to a   4 mm calculus at the mid left ureter.  No right-sided hydronephrosis is noted.    BLADDER: Nondistended.  REPRODUCTIVE ORGANS: Unremarkable.    BOWEL: No bowel obstruction. Appendix normal in appearance.  PERITONEUM: No ascites.  VESSELS:  Within normal limits.  RETROPERITONEUM: No lymphadenopathy.    ABDOMINAL WALL: Within normal limits.  BONES: Sclerotic focus iliac aspect of right inferior sacroiliac joint is   stable in appearance.    IMPRESSION:     Mild to moderate left-sided hydroureteronephrosis secondary to a 4 mm   obstructing calculus mid ureter.                      DOYLE REID M.D., ATTENDING RADIOLOGIST  This document has been electronically signed. May 28 2019 10:32AM    < end of copied text >

## 2019-05-28 NOTE — ED ADULT TRIAGE NOTE - CHIEF COMPLAINT QUOTE
Pt reports left side flank pain all morning, had a kidney stone removed 15 years ago , pt rec'd 30 IM Toradol for pain.

## 2019-05-28 NOTE — H&P ADULT - NSHPPHYSICALEXAM_GEN_ALL_CORE
Vital Signs Last 24 Hrs  T(C): 36.5 (28 May 2019 13:24), Max: 36.7 (28 May 2019 08:24)  T(F): 97.7 (28 May 2019 13:24), Max: 98.1 (28 May 2019 10:35)  HR: 110 (28 May 2019 13:24) (101 - 110)  BP: 115/81 (28 May 2019 13:24) (115/81 - 129/94)  BP(mean): --  RR: 17 (28 May 2019 13:24) (17 - 20)  SpO2: 97% (28 May 2019 13:24) (97% - 100%)    General: WN/WD NAD  Neurology: A&Ox3, nonfocal, MENDOSA x 4  Respiratory: CTA B/L  CV: RRR, S1S2, no murmurs, rubs or gallops  Abdominal: Soft, epigastric, RLE tenderness on deep palpation, ND +BS  Extremities: No edema, + peripheral pulses

## 2019-05-28 NOTE — ED ADULT NURSE REASSESSMENT NOTE - NS ED NURSE REASSESS COMMENT FT1
Phlebotomy still at bedside for lactate draw.  First specimen hemolyzed per the lab.  ED Attending Karly already aware of this.

## 2019-05-28 NOTE — ED ADULT NURSE REASSESSMENT NOTE - NS ED NURSE REASSESS COMMENT FT1
Patient verbalizes her frustration about being in the hospital; nausea is better s/p IV lorazepam.  Maintenance fluids in progress as ordered; safety maintained.  Family is at the bedside.  Privacy curtains up for patient.

## 2019-05-28 NOTE — H&P ADULT - NSICDXPASTSURGICALHX_GEN_ALL_CORE_FT
PAST SURGICAL HISTORY:  History of bilateral breast reduction surgery     S/P cholecystectomy     S/P rhinoplasty     S/P sinus surgery

## 2019-05-28 NOTE — ED ADULT NURSE REASSESSMENT NOTE - NS ED NURSE REASSESS COMMENT FT1
Pain states pain is increasing, IV pain meds given as ordered by ED Attending Karly.  Patient also states she feels "like she has a fever".  Vital signs taken and documented in flow sheet.  No fever noted.  Still pending CT read.  Patient and her parents aware of this.  Blankets given to patient for comfort.  Safety maintained.

## 2019-05-28 NOTE — ED PROVIDER NOTE - PHYSICAL EXAMINATION
Gen: Alert, NAD  Head/eyes: NC/AT, PERRL, EOMI, normal lids/conjunctiva, no scleral icterus  ENT: airway patent  Neck: supple, no tenderness/meningismus/JVD, Trachea midline  Pulm/lung: Bilateral clear BS, normal resp effort, no wheeze/stridor/retractions  CV/heart: RRR, no M/R/G, +2 dist pulses (radial, pedal DP/PT, popliteal)  GI/Abd: soft, NT/ND, +BS, no guarding/rebound tenderness  Musculoskeletal: no edema/erythema/cyanosis, FROM in all extremities, no C/T/L spine ttp, no cvat b/l  Skin: no rash, no vesicles, no petechaie, no ecchymosis, no swelling  Neuro: AAOx3, CN 2-12 intact, normal sensation, 5/5 motor strength in all extremities, normal gait, no dysmetria

## 2019-05-28 NOTE — CHART NOTE - NSCHARTNOTEFT_GEN_A_CORE
Called by RN for nausea    Subjective & Objective: Pt seen and examined at bedside. Pt states she has nausea 10/10, however cannot vomit. Pt states she has tolerable pain from the kidney stone. Pt has history of IBS and is going to the [facility] next month for w/up of IBS. Pt stating that she is having some spasmodic pain and GERD which she attributes as the reason for her nausea.      Vital Signs Last 24 Hrs  T(C): 36.9 (28 May 2019 18:59), Max: 36.9 (28 May 2019 18:59)  T(F): 98.4 (28 May 2019 18:59), Max: 98.4 (28 May 2019 18:59)  HR: 105 (28 May 2019 18:59) (101 - 127)  BP: 118/83 (28 May 2019 18:59) (115/81 - 129/94)  BP(mean): --  RR: 17 (28 May 2019 18:59) (16 - 20)  SpO2: 96% (28 May 2019 18:59) (96% - 100%)    PHYSICAL EXAM:     GENERAL: Pt appears uncomfortable  HEENT: NC/AT, EOMI, neck supple, MMM  RESPIRATORY: LCTAB/L, no rhonchi, rales, or wheezing  CARDIOVASCULAR: RRR, no murmurs, gallops, rubs  ABDOMINAL: soft, non-tender, non-distended, positive bowel sounds   EXTREMITIES: no clubbing, cyanosis, or edema  NEUROLOGICAL: alert and oriented x 3, non-focal      Assessment & Plan: Pt is a 33y Female with PMH  IBS, GERD, renal stones admitted for renal calculus - 4mm obstructing calculus with mild-moderate left hydronephrosis. Pt now with nausea possibly secondary to IBS spasms vs morphine ordered for pain.    -will change pain meds to Toradol 15mg IV for moderate and Toradol 30mg for severe pain and d/c morphine  -resume home med of belladonna for IBS spasms (not on formulary will send down to pharmacy to verify and dispense).  -consider GI consult in the morning  -will follow, RN to call with any changes

## 2019-05-28 NOTE — ED ADULT NURSE REASSESSMENT NOTE - NS ED NURSE REASSESS COMMENT FT1
Patient did not receive morphine as ordered at her own request.  Pain is now waxing and waning s/p toradol IM given PTA by EMS.

## 2019-05-28 NOTE — ED ADULT NURSE REASSESSMENT NOTE - NS ED NURSE REASSESS COMMENT FT1
Patient requesting to see urology (Dr. Dorsey or Dr. Holt) due to history of "infected kidney stone" to the R side about fifteen years ago.  Patient now also states that she knew she had a kidney stone in her L kidney that her urologist told her about a few months ago.  ED Attending Karly aware of this, urology to be paged.

## 2019-05-28 NOTE — ED ADULT NURSE REASSESSMENT NOTE - NS ED NURSE REASSESS COMMENT FT1
Patient appears comfortable in appearance on stretcher with family at the bedside.  ED Attending Karly made aware of this.  Patient c/o of nausea, given zofran IV as ordered.  IV ceftriaxone in progress.  Safety maintained.  ED MD to reassess.

## 2019-05-28 NOTE — H&P ADULT - NSHPREVIEWOFSYSTEMS_GEN_ALL_CORE
REVIEW OF SYSTEMS:    CONSTITUTIONAL: No weakness, fevers or chills  EYES/ENT: No visual changes;  No vertigo or throat pain   NECK: No pain or stiffness  RESPIRATORY: No cough, wheezing, hemoptysis; No shortness of breath  CARDIOVASCULAR: No chest pain or palpitations  GASTROINTESTINAL: + abdominal pain, nausea. No vomiting, or hematemesis; No diarrhea or constipation. No melena or hematochezia.  GENITOURINARY: No dysuria, frequency or hematuria  NEUROLOGICAL: No numbness or weakness    All other review of systems is negative unless indicated above.

## 2019-05-28 NOTE — CONSULT NOTE ADULT - PROBLEM SELECTOR RECOMMENDATION 9
4 mm left with intractable pain and nausea. Patient to be admitted for analgesia and hydration. Tamsulosin 0.4 mg prescribed. Ceftriaxone prescribed for prophylaxis. 4 mm left with intractable pain and nausea. Patient to be admitted for analgesia and hydration. Tamsulosin 0.4 mg prescribed. Ceftriaxone prescribed for prophylaxis. KUB in AM.

## 2019-05-28 NOTE — CONSULT NOTE ADULT - SUBJECTIVE AND OBJECTIVE BOX
CHIEF COMPLAINT: Left flank pain    HISTORY OF PRESENT ILLNESS:    The patient is a 33 year old female with left renal colic secondary to a 4 mm left ureteral stone. She developed acute onset of intense flank pain this AM prompting presentation to ER. Pain has persisted despite analgesics. She denies fever, chills. She does c/o nausea.  CT demonstrated a 4 mm left mid ureteral stone. She has had urolithiases in the past s/p left ureteral stent.        PAST MEDICAL & SURGICAL HISTORY:  IBS (irritable bowel syndrome)  Anxiety  Cholelithiasis  S/P cholecystectomy  History of bilateral breast reduction surgery  S/P sinus surgery  S/P rhinoplasty      REVIEW OF SYSTEMS:    CONSTITUTIONAL: No weakness, fevers or chills  EYES/ENT: No visual changes;  No vertigo or throat pain   NECK: No pain or stiffness  RESPIRATORY: No cough, wheezing, hemoptysis; No shortness of breath  CARDIOVASCULAR: No chest pain or palpitations  GASTROINTESTINAL: No abdominal or epigastric pain. No nausea, vomiting, or hematemesis; No diarrhea or constipation. No melena or hematochezia.  GENITOURINARY: as above  NEUROLOGICAL: No numbness or weakness  SKIN: No itching, burning, rashes, or lesions   All other review of systems is negative unless indicated above.    MEDICATIONS  (STANDING):    MEDICATIONS  (PRN):      Allergies    adhesives (Rash)  No Known Drug Allergies    Intolerances        SOCIAL HISTORY:    FAMILY HISTORY:  No pertinent family history in first degree relatives      Vital Signs Last 24 Hrs  T(C): 36.5 (28 May 2019 13:24), Max: 36.7 (28 May 2019 08:24)  T(F): 97.7 (28 May 2019 13:24), Max: 98.1 (28 May 2019 10:35)  HR: 110 (28 May 2019 13:24) (101 - 110)  BP: 115/81 (28 May 2019 13:24) (115/81 - 129/94)  BP(mean): --  RR: 17 (28 May 2019 13:24) (17 - 20)  SpO2: 97% (28 May 2019 13:24) (97% - 100%)    PHYSICAL EXAM:    Constitutional: NAD, well-developed  Back: Normal spine flexure, No CVA tenderness  Abd: Soft, NT/ND, No CVAT  Extremities: No peripheral edema  Neurological: A/O x 3, no focal deficits  Psychiatric: Normal mood, normal affect  Skin: No rashes    LABS:                        12.7   7.16  )-----------( 352      ( 28 May 2019 08:49 )             38.7         139  |  105  |  15  ----------------------------<  96  3.9   |  28  |  0.78    Ca    8.6      28 May 2019 08:49    TPro  7.3  /  Alb  4.2  /  TBili  0.3  /  DBili  x   /  AST  16  /  ALT  28  /  AlkPhos  54      PT/INR - ( 28 May 2019 08:49 )   PT: 11.0 sec;   INR: 0.97 ratio         PTT - ( 28 May 2019 08:49 )  PTT:28.7 sec  Urinalysis Basic - ( 28 May 2019 09:30 )    Color: Yellow / Appearance: Clear / S.015 / pH: x  Gluc: x / Ketone: Trace  / Bili: Small / Urobili: Negative   Blood: x / Protein: 25 mg/dL / Nitrite: Negative   Leuk Esterase: Trace / RBC: >50 /HPF / WBC 0-2   Sq Epi: x / Non Sq Epi: Few / Bacteria: Occasional      RADIOLOGY & ADDITIONAL STUDIES:  < from: CT Renal Stone Hunt (19 @ 09:53) >    EXAM:  CT RENAL STONE HUNT                            PROCEDURE DATE:  2019          INTERPRETATION:  CLINICAL INFORMATION: Left flank and groin pain.    COMPARISON: CT scan abdomen pelvis 2019.    PROCEDURE:   CT of the Abdomen and Pelvis was performed without intravenous contrast.   Intravenous contrast: None.  Oral contrast: None.  Sagittal and coronal reformats were performed.    FINDINGS:    LOWER CHEST: Within normal limits.    The evaluation of the solid organ parenchyma is limited without   intravenous contrast.    LIVER: Within normal limits.  BILE DUCTS: Normal caliber.  GALLBLADDER: Within normal limits.  SPLEEN: Within normal limits.  PANCREAS: Within normal limits.  ADRENALS: Within normal limits.  KIDNEYS/URETERS:   There is mild to moderate left-sided hydroureteronephrosis secondary to a   4 mm calculus at the mid left ureter.  No right-sided hydronephrosis is noted.    BLADDER: Nondistended.  REPRODUCTIVE ORGANS: Unremarkable.    BOWEL: No bowel obstruction. Appendix normal in appearance.  PERITONEUM: No ascites.  VESSELS:  Within normal limits.  RETROPERITONEUM: No lymphadenopathy.    ABDOMINAL WALL: Within normal limits.  BONES: Sclerotic focus iliac aspect of right inferior sacroiliac joint is   stable in appearance.    IMPRESSION:     Mild to moderate left-sided hydroureteronephrosis secondary to a 4 mm   obstructing calculus mid ureter.                      DOYLE REID M.D., ATTENDING RADIOLOGIST  This document has been electronically signed. May 28 2019 10:32AM                < end of copied text > CHIEF COMPLAINT: Left flank pain    HISTORY OF PRESENT ILLNESS:    The patient is a 33 year old female with left renal colic secondary to a 4 mm left ureteral stone. She developed acute onset of intense flank pain this AM prompting presentation to ER. Pain has persisted despite analgesics. She denies fever, chills. She does c/o nausea.  CT demonstrated a 4 mm left mid ureteral stone. She has had urolithiases in the past s/p left ureteral stent.        PAST MEDICAL & SURGICAL HISTORY:  IBS (irritable bowel syndrome)  Anxiety  Cholelithiasis  S/P cholecystectomy  History of bilateral breast reduction surgery  S/P sinus surgery  S/P rhinoplasty      REVIEW OF SYSTEMS:    CONSTITUTIONAL: No weakness, fevers or chills  EYES/ENT: No visual changes;  No vertigo or throat pain   NECK: No pain or stiffness  RESPIRATORY: No cough, wheezing, hemoptysis; No shortness of breath  CARDIOVASCULAR: No chest pain or palpitations  GASTROINTESTINAL: No abdominal or epigastric pain. No nausea, vomiting, or hematemesis; No diarrhea or constipation. No melena or hematochezia.  GENITOURINARY: as above  NEUROLOGICAL: No numbness or weakness  SKIN: No itching, burning, rashes, or lesions   All other review of systems is negative unless indicated above.    MEDICATIONS  (STANDING):    MEDICATIONS  (PRN):      Allergies    adhesives (Rash)  No Known Drug Allergies    Intolerances        SOCIAL HISTORY:    FAMILY HISTORY:  No pertinent family history in first degree relatives      Vital Signs Last 24 Hrs  T(C): 36.5 (28 May 2019 13:24), Max: 36.7 (28 May 2019 08:24)  T(F): 97.7 (28 May 2019 13:24), Max: 98.1 (28 May 2019 10:35)  HR: 110 (28 May 2019 13:24) (101 - 110)  BP: 115/81 (28 May 2019 13:24) (115/81 - 129/94)  BP(mean): --  RR: 17 (28 May 2019 13:24) (17 - 20)  SpO2: 97% (28 May 2019 13:24) (97% - 100%)    PHYSICAL EXAM:    Constitutional: NAD, well-developed  Back: Normal spine flexure, No CVA tenderness  Abd: Soft, NT/ND, mild left CVAT  Extremities: No peripheral edema  Neurological: A/O x 3, no focal deficits  Psychiatric: Normal mood, normal affect  Skin: No rashes    LABS:                        12.7   7.16  )-----------( 352      ( 28 May 2019 08:49 )             38.7         139  |  105  |  15  ----------------------------<  96  3.9   |  28  |  0.78    Ca    8.6      28 May 2019 08:49    TPro  7.3  /  Alb  4.2  /  TBili  0.3  /  DBili  x   /  AST  16  /  ALT  28  /  AlkPhos  54      PT/INR - ( 28 May 2019 08:49 )   PT: 11.0 sec;   INR: 0.97 ratio         PTT - ( 28 May 2019 08:49 )  PTT:28.7 sec  Urinalysis Basic - ( 28 May 2019 09:30 )    Color: Yellow / Appearance: Clear / S.015 / pH: x  Gluc: x / Ketone: Trace  / Bili: Small / Urobili: Negative   Blood: x / Protein: 25 mg/dL / Nitrite: Negative   Leuk Esterase: Trace / RBC: >50 /HPF / WBC 0-2   Sq Epi: x / Non Sq Epi: Few / Bacteria: Occasional      RADIOLOGY & ADDITIONAL STUDIES:  < from: CT Renal Stone Hunt (19 @ 09:53) >    EXAM:  CT RENAL STONE HUNT                            PROCEDURE DATE:  2019          INTERPRETATION:  CLINICAL INFORMATION: Left flank and groin pain.    COMPARISON: CT scan abdomen pelvis 2019.    PROCEDURE:   CT of the Abdomen and Pelvis was performed without intravenous contrast.   Intravenous contrast: None.  Oral contrast: None.  Sagittal and coronal reformats were performed.    FINDINGS:    LOWER CHEST: Within normal limits.    The evaluation of the solid organ parenchyma is limited without   intravenous contrast.    LIVER: Within normal limits.  BILE DUCTS: Normal caliber.  GALLBLADDER: Within normal limits.  SPLEEN: Within normal limits.  PANCREAS: Within normal limits.  ADRENALS: Within normal limits.  KIDNEYS/URETERS:   There is mild to moderate left-sided hydroureteronephrosis secondary to a   4 mm calculus at the mid left ureter.  No right-sided hydronephrosis is noted.    BLADDER: Nondistended.  REPRODUCTIVE ORGANS: Unremarkable.    BOWEL: No bowel obstruction. Appendix normal in appearance.  PERITONEUM: No ascites.  VESSELS:  Within normal limits.  RETROPERITONEUM: No lymphadenopathy.    ABDOMINAL WALL: Within normal limits.  BONES: Sclerotic focus iliac aspect of right inferior sacroiliac joint is   stable in appearance.    IMPRESSION:     Mild to moderate left-sided hydroureteronephrosis secondary to a 4 mm   obstructing calculus mid ureter.                      DOYLE REID M.D., ATTENDING RADIOLOGIST  This document has been electronically signed. May 28 2019 10:32AM                < end of copied text >

## 2019-05-28 NOTE — ED PROVIDER NOTE - PROGRESS NOTE DETAILS
discussed case with Dr. Mahajan, recommend giving abx, wants reassessment of patient patient after morphine, if continues to have pain to give him call back discussed case with Dr. Logan, will admit for pain control, Dr. Mahajan made aware of admission

## 2019-05-28 NOTE — ED ADULT NURSE NOTE - OBJECTIVE STATEMENT
Patient with history of IBS and cholecystectomy BIBA from home with c/o Patient with history of IBS and cholecystectomy BIBA from home with c/o L flank pain Patient with history of IBS, kidney stone and cholecystectomy with associated chronic nausea BIBA from home with c/o L flank pain since this morning.  Pain is described as constant and sharp and is 10/10 pain.  No pain meds taken at home, received IM toradol by EMS to little relief upon arrival to unit.  Patient states she tried urinating this morning but "had difficulty emptying bladder".  Denies hematuria, fevers, chills, sick contacts.  Family is at the bedside.  ED MD Karly at the bedside for evaluation.

## 2019-05-28 NOTE — ED PROVIDER NOTE - NS ED ROS FT

## 2019-05-29 DIAGNOSIS — B95.2 ENTEROCOCCUS AS THE CAUSE OF DISEASES CLASSIFIED ELSEWHERE: ICD-10-CM

## 2019-05-29 DIAGNOSIS — Z29.9 ENCOUNTER FOR PROPHYLACTIC MEASURES, UNSPECIFIED: ICD-10-CM

## 2019-05-29 DIAGNOSIS — Z01.818 ENCOUNTER FOR OTHER PREPROCEDURAL EXAMINATION: ICD-10-CM

## 2019-05-29 DIAGNOSIS — K58.1 IRRITABLE BOWEL SYNDROME WITH CONSTIPATION: ICD-10-CM

## 2019-05-29 DIAGNOSIS — N13.30 UNSPECIFIED HYDRONEPHROSIS: ICD-10-CM

## 2019-05-29 LAB
ANION GAP SERPL CALC-SCNC: 7 MMOL/L — SIGNIFICANT CHANGE UP (ref 5–17)
APTT BLD: 21.8 SEC — LOW (ref 28.5–37)
BUN SERPL-MCNC: 7 MG/DL — SIGNIFICANT CHANGE UP (ref 7–23)
CALCIUM SERPL-MCNC: 8.1 MG/DL — LOW (ref 8.5–10.1)
CHLORIDE SERPL-SCNC: 110 MMOL/L — HIGH (ref 96–108)
CO2 SERPL-SCNC: 25 MMOL/L — SIGNIFICANT CHANGE UP (ref 22–31)
CREAT SERPL-MCNC: 0.52 MG/DL — SIGNIFICANT CHANGE UP (ref 0.5–1.3)
GLUCOSE SERPL-MCNC: 81 MG/DL — SIGNIFICANT CHANGE UP (ref 70–99)
HCT VFR BLD CALC: 33.8 % — LOW (ref 34.5–45)
HGB BLD-MCNC: 10.8 G/DL — LOW (ref 11.5–15.5)
INR BLD: 1.03 RATIO — SIGNIFICANT CHANGE UP (ref 0.88–1.16)
MCHC RBC-ENTMCNC: 28.7 PG — SIGNIFICANT CHANGE UP (ref 27–34)
MCHC RBC-ENTMCNC: 32 GM/DL — SIGNIFICANT CHANGE UP (ref 32–36)
MCV RBC AUTO: 89.9 FL — SIGNIFICANT CHANGE UP (ref 80–100)
NRBC # BLD: 0 /100 WBCS — SIGNIFICANT CHANGE UP (ref 0–0)
PLATELET # BLD AUTO: 227 K/UL — SIGNIFICANT CHANGE UP (ref 150–400)
POTASSIUM SERPL-MCNC: 3.7 MMOL/L — SIGNIFICANT CHANGE UP (ref 3.5–5.3)
POTASSIUM SERPL-SCNC: 3.7 MMOL/L — SIGNIFICANT CHANGE UP (ref 3.5–5.3)
PROTHROM AB SERPL-ACNC: 11.7 SEC — SIGNIFICANT CHANGE UP (ref 10–12.9)
RBC # BLD: 3.76 M/UL — LOW (ref 3.8–5.2)
RBC # FLD: 13 % — SIGNIFICANT CHANGE UP (ref 10.3–14.5)
SODIUM SERPL-SCNC: 142 MMOL/L — SIGNIFICANT CHANGE UP (ref 135–145)
WBC # BLD: 3.79 K/UL — LOW (ref 3.8–10.5)
WBC # FLD AUTO: 3.79 K/UL — LOW (ref 3.8–10.5)

## 2019-05-29 PROCEDURE — 99233 SBSQ HOSP IP/OBS HIGH 50: CPT

## 2019-05-29 PROCEDURE — 74018 RADEX ABDOMEN 1 VIEW: CPT | Mod: 26

## 2019-05-29 PROCEDURE — 93010 ELECTROCARDIOGRAM REPORT: CPT

## 2019-05-29 PROCEDURE — 71045 X-RAY EXAM CHEST 1 VIEW: CPT | Mod: 26

## 2019-05-29 RX ORDER — ACETAMINOPHEN 500 MG
1000 TABLET ORAL ONCE
Refills: 0 | Status: COMPLETED | OUTPATIENT
Start: 2019-05-30 | End: 2019-05-30

## 2019-05-29 RX ORDER — PHENAZOPYRIDINE HCL 100 MG
200 TABLET ORAL THREE TIMES A DAY
Refills: 0 | Status: DISCONTINUED | OUTPATIENT
Start: 2019-05-29 | End: 2019-06-01

## 2019-05-29 RX ORDER — HYDROMORPHONE HYDROCHLORIDE 2 MG/ML
0.5 INJECTION INTRAMUSCULAR; INTRAVENOUS; SUBCUTANEOUS
Refills: 0 | Status: DISCONTINUED | OUTPATIENT
Start: 2019-05-29 | End: 2019-05-29

## 2019-05-29 RX ORDER — ACETAMINOPHEN 500 MG
1000 TABLET ORAL ONCE
Refills: 0 | Status: COMPLETED | OUTPATIENT
Start: 2019-05-29 | End: 2019-05-29

## 2019-05-29 RX ORDER — HYDROMORPHONE HYDROCHLORIDE 2 MG/ML
1 INJECTION INTRAMUSCULAR; INTRAVENOUS; SUBCUTANEOUS
Refills: 0 | Status: DISCONTINUED | OUTPATIENT
Start: 2019-05-29 | End: 2019-06-01

## 2019-05-29 RX ORDER — KETOROLAC TROMETHAMINE 30 MG/ML
30 SYRINGE (ML) INJECTION EVERY 6 HOURS
Refills: 0 | Status: DISCONTINUED | OUTPATIENT
Start: 2019-05-29 | End: 2019-06-01

## 2019-05-29 RX ORDER — LANOLIN ALCOHOL/MO/W.PET/CERES
5 CREAM (GRAM) TOPICAL AT BEDTIME
Refills: 0 | Status: DISCONTINUED | OUTPATIENT
Start: 2019-05-29 | End: 2019-06-01

## 2019-05-29 RX ORDER — FAMOTIDINE 10 MG/ML
20 INJECTION INTRAVENOUS
Refills: 0 | Status: DISCONTINUED | OUTPATIENT
Start: 2019-05-29 | End: 2019-05-31

## 2019-05-29 RX ORDER — SODIUM CHLORIDE 9 MG/ML
1000 INJECTION, SOLUTION INTRAVENOUS
Refills: 0 | Status: DISCONTINUED | OUTPATIENT
Start: 2019-05-29 | End: 2019-05-29

## 2019-05-29 RX ORDER — CEFTRIAXONE 500 MG/1
INJECTION, POWDER, FOR SOLUTION INTRAMUSCULAR; INTRAVENOUS
Refills: 0 | Status: DISCONTINUED | OUTPATIENT
Start: 2019-05-29 | End: 2019-05-30

## 2019-05-29 RX ORDER — CLONAZEPAM 1 MG
1 TABLET ORAL DAILY
Refills: 0 | Status: DISCONTINUED | OUTPATIENT
Start: 2019-05-29 | End: 2019-06-01

## 2019-05-29 RX ORDER — ONDANSETRON 8 MG/1
4 TABLET, FILM COATED ORAL EVERY 6 HOURS
Refills: 0 | Status: DISCONTINUED | OUTPATIENT
Start: 2019-05-29 | End: 2019-06-01

## 2019-05-29 RX ORDER — CEFTRIAXONE 500 MG/1
1 INJECTION, POWDER, FOR SOLUTION INTRAMUSCULAR; INTRAVENOUS EVERY 24 HOURS
Refills: 0 | Status: DISCONTINUED | OUTPATIENT
Start: 2019-05-30 | End: 2019-05-30

## 2019-05-29 RX ORDER — OXYCODONE HYDROCHLORIDE 5 MG/1
5 TABLET ORAL EVERY 4 HOURS
Refills: 0 | Status: DISCONTINUED | OUTPATIENT
Start: 2019-05-29 | End: 2019-06-01

## 2019-05-29 RX ORDER — ONDANSETRON 8 MG/1
4 TABLET, FILM COATED ORAL ONCE
Refills: 0 | Status: COMPLETED | OUTPATIENT
Start: 2019-05-29 | End: 2019-05-29

## 2019-05-29 RX ORDER — SODIUM CHLORIDE 9 MG/ML
1000 INJECTION, SOLUTION INTRAVENOUS
Refills: 0 | Status: DISCONTINUED | OUTPATIENT
Start: 2019-05-29 | End: 2019-05-30

## 2019-05-29 RX ORDER — ESCITALOPRAM OXALATE 10 MG/1
20 TABLET, FILM COATED ORAL DAILY
Refills: 0 | Status: DISCONTINUED | OUTPATIENT
Start: 2019-05-29 | End: 2019-06-01

## 2019-05-29 RX ORDER — CEFTRIAXONE 500 MG/1
1 INJECTION, POWDER, FOR SOLUTION INTRAMUSCULAR; INTRAVENOUS ONCE
Refills: 0 | Status: DISCONTINUED | OUTPATIENT
Start: 2019-05-29 | End: 2019-05-30

## 2019-05-29 RX ORDER — KETOROLAC TROMETHAMINE 30 MG/ML
15 SYRINGE (ML) INJECTION EVERY 6 HOURS
Refills: 0 | Status: DISCONTINUED | OUTPATIENT
Start: 2019-05-29 | End: 2019-06-01

## 2019-05-29 RX ADMIN — FAMOTIDINE 20 MILLIGRAM(S): 10 INJECTION INTRAVENOUS at 22:24

## 2019-05-29 RX ADMIN — HYDROMORPHONE HYDROCHLORIDE 0.5 MILLIGRAM(S): 2 INJECTION INTRAMUSCULAR; INTRAVENOUS; SUBCUTANEOUS at 14:10

## 2019-05-29 RX ADMIN — SODIUM CHLORIDE 150 MILLILITER(S): 9 INJECTION, SOLUTION INTRAVENOUS at 09:26

## 2019-05-29 RX ADMIN — HYDROMORPHONE HYDROCHLORIDE 0.5 MILLIGRAM(S): 2 INJECTION INTRAMUSCULAR; INTRAVENOUS; SUBCUTANEOUS at 13:22

## 2019-05-29 RX ADMIN — Medication 15 MILLIGRAM(S): at 22:50

## 2019-05-29 RX ADMIN — CEFTRIAXONE 100 GRAM(S): 500 INJECTION, POWDER, FOR SOLUTION INTRAMUSCULAR; INTRAVENOUS at 11:49

## 2019-05-29 RX ADMIN — HYDROMORPHONE HYDROCHLORIDE 0.5 MILLIGRAM(S): 2 INJECTION INTRAMUSCULAR; INTRAVENOUS; SUBCUTANEOUS at 13:35

## 2019-05-29 RX ADMIN — Medication 1000 MILLIGRAM(S): at 18:58

## 2019-05-29 RX ADMIN — Medication 1 MILLIGRAM(S): at 23:16

## 2019-05-29 RX ADMIN — Medication 1 TABLET(S): at 06:41

## 2019-05-29 RX ADMIN — ONDANSETRON 4 MILLIGRAM(S): 8 TABLET, FILM COATED ORAL at 15:30

## 2019-05-29 RX ADMIN — Medication 15 MILLIGRAM(S): at 22:23

## 2019-05-29 RX ADMIN — ESCITALOPRAM OXALATE 20 MILLIGRAM(S): 10 TABLET, FILM COATED ORAL at 21:07

## 2019-05-29 RX ADMIN — HYDROMORPHONE HYDROCHLORIDE 0.5 MILLIGRAM(S): 2 INJECTION INTRAMUSCULAR; INTRAVENOUS; SUBCUTANEOUS at 14:29

## 2019-05-29 RX ADMIN — HYDROMORPHONE HYDROCHLORIDE 0.5 MILLIGRAM(S): 2 INJECTION INTRAMUSCULAR; INTRAVENOUS; SUBCUTANEOUS at 13:33

## 2019-05-29 RX ADMIN — HYDROMORPHONE HYDROCHLORIDE 0.5 MILLIGRAM(S): 2 INJECTION INTRAMUSCULAR; INTRAVENOUS; SUBCUTANEOUS at 13:54

## 2019-05-29 RX ADMIN — ONDANSETRON 4 MILLIGRAM(S): 8 TABLET, FILM COATED ORAL at 12:59

## 2019-05-29 RX ADMIN — HYDROMORPHONE HYDROCHLORIDE 0.5 MILLIGRAM(S): 2 INJECTION INTRAMUSCULAR; INTRAVENOUS; SUBCUTANEOUS at 13:40

## 2019-05-29 RX ADMIN — FAMOTIDINE 20 MILLIGRAM(S): 10 INJECTION INTRAVENOUS at 05:06

## 2019-05-29 RX ADMIN — Medication 1000 MILLIGRAM(S): at 13:15

## 2019-05-29 RX ADMIN — ONDANSETRON 4 MILLIGRAM(S): 8 TABLET, FILM COATED ORAL at 06:38

## 2019-05-29 RX ADMIN — Medication 200 MILLIGRAM(S): at 18:22

## 2019-05-29 RX ADMIN — Medication 5 MILLIGRAM(S): at 22:24

## 2019-05-29 RX ADMIN — HYDROMORPHONE HYDROCHLORIDE 0.5 MILLIGRAM(S): 2 INJECTION INTRAMUSCULAR; INTRAVENOUS; SUBCUTANEOUS at 14:55

## 2019-05-29 RX ADMIN — Medication 200 MILLIGRAM(S): at 22:24

## 2019-05-29 RX ADMIN — Medication 400 MILLIGRAM(S): at 13:00

## 2019-05-29 RX ADMIN — ONDANSETRON 4 MILLIGRAM(S): 8 TABLET, FILM COATED ORAL at 21:07

## 2019-05-29 RX ADMIN — Medication 1 TABLET(S): at 19:22

## 2019-05-29 RX ADMIN — Medication 400 MILLIGRAM(S): at 18:28

## 2019-05-29 NOTE — PROGRESS NOTE ADULT - ASSESSMENT
32 yo F with above PMH presents with pain and nausea. Patient had CT showing Mild to moderate left-sided hydroureteronephrosis secondary to a 4 mm obstructing calculus mid ureter.  -Admit to medicine  -pain control with morphine  -Resume home medications  -Dr Mahajan urology recs appreciated  -Continue Tamsolusin, continue IV Ceftriaxone  -NPO for now except meds and ice chips  -DVT ppx: Lovenox 34 yo F with above PMH presents with pain and nausea. Patient had CT showing Mild to moderate left-sided hydroureteronephrosis secondary to a 4 mm obstructing calculus mid ureter.

## 2019-05-29 NOTE — PROGRESS NOTE ADULT - PROBLEM SELECTOR PLAN 2
I personally reviewed the patient's labs: CBC, BMP, coagulation profile  I personally reviewed the patient's EKG and my interpretation is: PENDING  I personally reviewed the patient's CXR and my interpretation is: PENDING  The patient's RCRI score is: 0  The patient is considered PENDING WORKUP for low risk procedure I personally reviewed the patient's labs: CBC, BMP, coagulation profile  I personally reviewed the patient's EKG and my interpretation is: nsr, nonspecific st changes  I personally reviewed the patient's CXR and my interpretation is: clear lung fields  The patient's RCRI score is: 0  The patient is considered low risk for low risk procedure

## 2019-05-29 NOTE — BRIEF OPERATIVE NOTE - OPERATION/FINDINGS
marked left hydro, urine in kidney bloody and mildly cloudy; 5 mm stone in left prox ureter, may have been shoved back into kidney during procedure

## 2019-05-29 NOTE — PROGRESS NOTE ADULT - ASSESSMENT
Afeb  Awake, alert but pain is 6-7/10 level  KUB, stone not visualized, extensive stool in entire colon  Labs are normal but urine culture growing enterococcus as above  For OR today, likely to be cysto left ureteral stent insertion given the UTI  Pt aware and agrees  On Rocephin

## 2019-05-29 NOTE — BRIEF OPERATIVE NOTE - NSICDXBRIEFPROCEDURE_GEN_ALL_CORE_FT
PROCEDURES:  Insertion, ureteral stent 29-May-2019 12:35:32  James Dorsey  Insertion, stent, airway 29-May-2019 12:35:26  James Dorsey  Cystoscopy 29-May-2019 12:35:11  James Dorsey

## 2019-05-29 NOTE — PROGRESS NOTE ADULT - SUBJECTIVE AND OBJECTIVE BOX
PAST MEDICAL & SURGICAL HISTORY:  IBS (irritable bowel syndrome)  Anxiety  Cholelithiasis  S/P cholecystectomy  History of bilateral breast reduction surgery  S/P sinus surgery  S/P rhinoplasty      REVIEW OF SYSTEMS:    MEDICATIONS  (STANDING):  cefTRIAXone   IVPB 1 Gram(s) IV Intermittent every 24 hours  enoxaparin Injectable 40 milliGRAM(s) SubCutaneous daily  escitalopram 20 milliGRAM(s) Oral daily  famotidine Injectable 20 milliGRAM(s) IV Push two times a day  melatonin 5 milliGRAM(s) Oral at bedtime  sodium chloride 0.45%. 1000 milliLiter(s) (150 mL/Hr) IV Continuous <Continuous>    MEDICATIONS  (PRN):  clonazePAM  Tablet 1 milliGRAM(s) Oral daily PRN anxiety  Donnatal 1 Tablet(s) Oral every 8 hours PRN IBS symptoms  ketorolac   Injectable 15 milliGRAM(s) IV Push every 6 hours PRN Moderate Pain (4 - 6)  ketorolac   Injectable 30 milliGRAM(s) IV Push every 6 hours PRN Severe Pain (7 - 10)  ondansetron Injectable 4 milliGRAM(s) IV Push every 6 hours PRN Nausea and/or Vomiting          Allergies    adhesives (Rash)  No Known Drug Allergies    Intolerances        FAMILY HISTORY:  No pertinent family history in first degree relatives      Vital Signs Last 24 Hrs  T(C): 36.7 (29 May 2019 07:15), Max: 37 (29 May 2019 00:07)  T(F): 98 (29 May 2019 07:15), Max: 98.6 (29 May 2019 00:07)  HR: 80 (29 May 2019 07:15) (80 - 127)  BP: 110/78 (29 May 2019 07:59) (110/78 - 134/80)  BP(mean): --  RR: 16 (29 May 2019 07:15) (16 - 18)  SpO2: 100% (29 May 2019 07:15) (96% - 100%)    LABS:                        10.8   3.79  )-----------( 227      ( 29 May 2019 06:16 )             33.8     05-    142  |  110<H>  |  7   ----------------------------<  81  3.7   |  25  |  0.52    Ca    8.1<L>      29 May 2019 06:16    TPro  7.3  /  Alb  4.2  /  TBili  0.3  /  DBili  x   /  AST  16  /  ALT  28  /  AlkPhos  54  -    PT/INR - ( 28 May 2019 08:49 )   PT: 11.0 sec;   INR: 0.97 ratio         PTT - ( 28 May 2019 08:49 )  PTT:28.7 sec  Urinalysis Basic - ( 28 May 2019 09:30 )    Color: Yellow / Appearance: Clear / S.015 / pH: x  Gluc: x / Ketone: Trace  / Bili: Small / Urobili: Negative   Blood: x / Protein: 25 mg/dL / Nitrite: Negative   Leuk Esterase: Trace / RBC: >50 /HPF / WBC 0-2   Sq Epi: x / Non Sq Epi: Few / Bacteria: Occasional      Urine Culture:  @ 10:52  Urine Culure Resuls   50,000 - 99,000 CFU/mL Enterococcus species  Organism --      RADIOLOGY & ADDITIONAL STUDIES: 4 mm stone left prox ureter at L4

## 2019-05-29 NOTE — PROGRESS NOTE ADULT - SUBJECTIVE AND OBJECTIVE BOX
Patient is a 33y old  Female who presents with a chief complaint of flank pain, renal stone (28 May 2019 14:24)      FROM ADMISSION H+P:   HPI:  34 yo F with PMH of IBS, GERD, renal stones comes in with complaints of flank pain, nausea. Patient states this started yesterday pain 10/10. Patient came to ER had CT scan showing 4mm renal stone. Urology Dr. Mahajan consulted.   As per patient shes going to [facility] in a month for further work up of her IBS. (28 May 2019 14:24)      ----  INTERVAL HPI/OVERNIGHT EVENTS: Pt seen and evaluated at the bedside. No acute overnight events occurred. Pt w/ severe nausea overnight. No vomiting. Admits some pain and cramping which was in R flank last night but now seems to have moved to suprapubic and b/l lower quadrants. Nausea remains 10/10. Pt suffers from IBS and has plans for workup at [facility] next month. She states that her symptoms used to be diarrhea predominant but now have trended more toward constipation predominant. Last BM was 2 days ago. No other complaitns at this time. She is not straining her urine. Voided multiple times overnight but does not think she passed a stone. No recent abx use ("I try to stay away from abx")    PRE-OP: exercise tolerance >4.0 mets [  ] YES   [  ] NO  ; active tobacco use [  ] YES   [  ] NO  ;  known h/o LESTER [  ] YES   [  ] NO  ; presence of obesity  [  ] YES   [  ] NO  ;  presence of alcohol misuse [  ] YES   [  ] NO  ; presence of illicit drug use [  ] YES   [  ] NO  ; personal history of complications from anesthesia [  ] YES   [  ] NO      ----  PAST MEDICAL & SURGICAL HISTORY:  IBS (irritable bowel syndrome)  Anxiety  Cholelithiasis  S/P cholecystectomy  History of bilateral breast reduction surgery  S/P sinus surgery  S/P rhinoplasty      FAMILY HISTORY:  No pertinent family history in first degree relatives      Allergies    adhesives (Rash)  No Known Drug Allergies    Intolerances        ----  REVIEW OF SYSTEMS:  CONSTITUTIONAL: denies fever, chills, fatigue, weakness  HEENT: denies blurred vision, sore throat  SKIN: denies new lesions, rash  CARDIOVASCULAR: denies chest pain, chest pressure, palpitations  RESPIRATORY: denies shortness of breath, sputum production  GASTROINTESTINAL: as per HPI  GENITOURINARY: as per HPI  NEUROLOGICAL: denies numbness, headache, focal weakness  MUSCULOSKELETAL: denies new joint pain, muscle aches   LYMPHATICS: denies enlarged lymph nodes, extremity swelling      ----  PHYSICAL EXAM:  GENERAL: patient appears well, no acute distress, appropriately interactive, resting comfortably, family @ bedside  EYES: sclera clear, no exudates  ENMT: oropharynx clear without erythema, moist mucous membranes  NECK: supple, soft, no thyromegaly noted  LUNGS: good air entry bilaterally, clear to auscultation, symmetric breath sounds, no wheezing or rhonchi appreciated  HEART: soft S1/S2, regular rate and rhythm, no murmurs noted, no noted edema to b/l LE  GASTROINTESTINAL: abdomen is soft, nontender, minimally distended throughout, hypoactive bowel sounds, no palpable masses  INTEGUMENT: good skin turgor, appropriate for ethnicity, appears well perfused, no jaundice noted  MUSCULOSKELETAL: no clubbing or cyanosis, no obvious deformity  NEUROLOGIC: awake, alert, oriented x3, good muscle tone in 4 extremities, no obvious sensory deficits  HEME/LYMPH: no palpable supraclavicular nodules, no obvious ecchymosis     T(C): 36.7 (19 @ 07:15), Max: 37 (19 @ 00:07)  HR: 80 (19 @ 07:15) (80 - 127)  BP: 110/78 (19 @ 07:59) (110/78 - 134/80)  RR: 16 (19 @ 07:15) (16 - 18)  SpO2: 100% (19 @ 07:15) (96% - 100%)  Wt(kg): --    ----  I&O's Summary    28 May 2019 07:01  -  29 May 2019 07:00  --------------------------------------------------------  IN: 1650 mL / OUT: 600 mL / NET: 1050 mL        LABS:                        10.8   3.79  )-----------( 227      ( 29 May 2019 06:16 )             33.8         142  |  110<H>  |  7   ----------------------------<  81  3.7   |  25  |  0.52    Ca    8.1<L>      29 May 2019 06:16    TPro  7.3  /  Alb  4.2  /  TBili  0.3  /  DBili  x   /  AST  16  /  ALT  28  /  AlkPhos  54      PT/INR - ( 28 May 2019 08:49 )   PT: 11.0 sec;   INR: 0.97 ratio         PTT - ( 28 May 2019 08:49 )  PTT:28.7 sec  Urinalysis Basic - ( 28 May 2019 09:30 )    Color: Yellow / Appearance: Clear / S.015 / pH: x  Gluc: x / Ketone: Trace  / Bili: Small / Urobili: Negative   Blood: x / Protein: 25 mg/dL / Nitrite: Negative   Leuk Esterase: Trace / RBC: >50 /HPF / WBC 0-2   Sq Epi: x / Non Sq Epi: Few / Bacteria: Occasional      CAPILLARY BLOOD GLUCOSE           @ 10:52   50,000 - 99,000 CFU/mL Enterococcus species  --  --          Culture - Urine (collected 19 @ 10:52)  Source: .Urine Clean Catch (Midstream)  Preliminary Report (19 @ 08:21):    50,000 - 99,000 CFU/mL Enterococcus species        ----  Personally reviewed:  Vital sign trends: [ x ] yes    [  ] no     [  ] n/a  Laboratory results: [ x ] yes    [  ] no     [  ] n/a  Radiology results: [ x ] yes    [  ] no     [  ] n/a  Culture results: [ x ] yes    [  ] no     [  ] n/a  Consultant recommendations: [ x ] yes    [  ] no     [  ] n/a Patient is a 33y old  Female who presents with a chief complaint of flank pain, renal stone (28 May 2019 14:24)      FROM ADMISSION H+P:   HPI:  34 yo F with PMH of IBS, GERD, renal stones comes in with complaints of flank pain, nausea. Patient states this started yesterday pain 10/10. Patient came to ER had CT scan showing 4mm renal stone. Urology Dr. Mahajan consulted.   As per patient shes going to [facility] in a month for further work up of her IBS. (28 May 2019 14:24)      ----  INTERVAL HPI/OVERNIGHT EVENTS: Pt seen and evaluated at the bedside. No acute overnight events occurred. Pt w/ severe nausea overnight. No vomiting. Admits some pain and cramping which was in R flank last night but now seems to have moved to suprapubic and b/l lower quadrants. Nausea remains 10/10. Pt suffers from IBS and has plans for workup at [facility] next month. She states that her symptoms used to be diarrhea predominant but now have trended more toward constipation predominant. Last BM was 2 days ago. No other complaitns at this time. She is not straining her urine. Voided multiple times overnight but does not think she passed a stone. No recent abx use ("I try to stay away from abx")    PRE-OP: exercise tolerance >4.0 mets [ x ] YES   [  ] NO  ; active tobacco use [  ] YES   [ x ] NO  ;  known h/o LESTER [  ] YES   [ x ] NO  ; presence of obesity  [  ] YES   [ x ] NO  ;  presence of alcohol misuse [  ] YES   [ x ] NO  ; presence of illicit drug use [  ] YES   [ x ] NO  ; personal history of complications from anesthesia [  ] YES   [ x ] NO      ----  PAST MEDICAL & SURGICAL HISTORY:  IBS (irritable bowel syndrome)  Anxiety  Cholelithiasis  S/P cholecystectomy  History of bilateral breast reduction surgery  S/P sinus surgery  S/P rhinoplasty      FAMILY HISTORY:  No pertinent family history in first degree relatives      Allergies    adhesives (Rash)  No Known Drug Allergies    Intolerances        ----  REVIEW OF SYSTEMS:  CONSTITUTIONAL: denies fever, chills, fatigue, weakness  HEENT: denies blurred vision, sore throat  SKIN: denies new lesions, rash  CARDIOVASCULAR: denies chest pain, chest pressure, palpitations  RESPIRATORY: denies shortness of breath, sputum production  GASTROINTESTINAL: as per HPI  GENITOURINARY: as per HPI  NEUROLOGICAL: denies numbness, headache, focal weakness  MUSCULOSKELETAL: denies new joint pain, muscle aches   LYMPHATICS: denies enlarged lymph nodes, extremity swelling      ----  PHYSICAL EXAM:  GENERAL: patient appears well, no acute distress, appropriately interactive, resting comfortably, family @ bedside  EYES: sclera clear, no exudates  ENMT: oropharynx clear without erythema, moist mucous membranes  NECK: supple, soft, no thyromegaly noted  LUNGS: good air entry bilaterally, clear to auscultation, symmetric breath sounds, no wheezing or rhonchi appreciated  HEART: soft S1/S2, regular rate and rhythm, no murmurs noted, no noted edema to b/l LE  GASTROINTESTINAL: abdomen is soft, nontender, minimally distended throughout, hypoactive bowel sounds, no palpable masses  INTEGUMENT: good skin turgor, appropriate for ethnicity, appears well perfused, no jaundice noted  MUSCULOSKELETAL: no clubbing or cyanosis, no obvious deformity  NEUROLOGIC: awake, alert, oriented x3, good muscle tone in 4 extremities, no obvious sensory deficits  HEME/LYMPH: no palpable supraclavicular nodules, no obvious ecchymosis     T(C): 36.7 (19 @ 07:15), Max: 37 (19 @ 00:07)  HR: 80 (19 @ 07:15) (80 - 127)  BP: 110/78 (19 @ 07:59) (110/78 - 134/80)  RR: 16 (19 @ 07:15) (16 - 18)  SpO2: 100% (19 @ 07:15) (96% - 100%)  Wt(kg): --    ----  I&O's Summary    28 May 2019 07:01  -  29 May 2019 07:00  --------------------------------------------------------  IN: 1650 mL / OUT: 600 mL / NET: 1050 mL        LABS:                        10.8   3.79  )-----------( 227      ( 29 May 2019 06:16 )             33.8         142  |  110<H>  |  7   ----------------------------<  81  3.7   |  25  |  0.52    Ca    8.1<L>      29 May 2019 06:16    TPro  7.3  /  Alb  4.2  /  TBili  0.3  /  DBili  x   /  AST  16  /  ALT  28  /  AlkPhos  54      PT/INR - ( 28 May 2019 08:49 )   PT: 11.0 sec;   INR: 0.97 ratio         PTT - ( 28 May 2019 08:49 )  PTT:28.7 sec  Urinalysis Basic - ( 28 May 2019 09:30 )    Color: Yellow / Appearance: Clear / S.015 / pH: x  Gluc: x / Ketone: Trace  / Bili: Small / Urobili: Negative   Blood: x / Protein: 25 mg/dL / Nitrite: Negative   Leuk Esterase: Trace / RBC: >50 /HPF / WBC 0-2   Sq Epi: x / Non Sq Epi: Few / Bacteria: Occasional      CAPILLARY BLOOD GLUCOSE           @ 10:52   50,000 - 99,000 CFU/mL Enterococcus species  --  --          Culture - Urine (collected 19 @ 10:52)  Source: .Urine Clean Catch (Midstream)  Preliminary Report (19 @ 08:21):    50,000 - 99,000 CFU/mL Enterococcus species        ----  Personally reviewed:  Vital sign trends: [ x ] yes    [  ] no     [  ] n/a  Laboratory results: [ x ] yes    [  ] no     [  ] n/a  Radiology results: [ x ] yes    [  ] no     [  ] n/a  Culture results: [ x ] yes    [  ] no     [  ] n/a  Consultant recommendations: [ x ] yes    [  ] no     [  ] n/a

## 2019-05-30 DIAGNOSIS — R11.0 NAUSEA: ICD-10-CM

## 2019-05-30 LAB
-  AMPICILLIN: SIGNIFICANT CHANGE UP
-  CIPROFLOXACIN: SIGNIFICANT CHANGE UP
-  LEVOFLOXACIN: SIGNIFICANT CHANGE UP
-  NITROFURANTOIN: SIGNIFICANT CHANGE UP
-  TETRACYCLINE: SIGNIFICANT CHANGE UP
-  VANCOMYCIN: SIGNIFICANT CHANGE UP
ANION GAP SERPL CALC-SCNC: 9 MMOL/L — SIGNIFICANT CHANGE UP (ref 5–17)
BUN SERPL-MCNC: 5 MG/DL — LOW (ref 7–23)
CALCIUM SERPL-MCNC: 8.3 MG/DL — LOW (ref 8.5–10.1)
CHLORIDE SERPL-SCNC: 106 MMOL/L — SIGNIFICANT CHANGE UP (ref 96–108)
CO2 SERPL-SCNC: 24 MMOL/L — SIGNIFICANT CHANGE UP (ref 22–31)
CREAT SERPL-MCNC: 0.61 MG/DL — SIGNIFICANT CHANGE UP (ref 0.5–1.3)
CULTURE RESULTS: SIGNIFICANT CHANGE UP
GLUCOSE SERPL-MCNC: 69 MG/DL — LOW (ref 70–99)
HCT VFR BLD CALC: 33.7 % — LOW (ref 34.5–45)
HGB BLD-MCNC: 10.8 G/DL — LOW (ref 11.5–15.5)
MCHC RBC-ENTMCNC: 28.9 PG — SIGNIFICANT CHANGE UP (ref 27–34)
MCHC RBC-ENTMCNC: 32 GM/DL — SIGNIFICANT CHANGE UP (ref 32–36)
MCV RBC AUTO: 90.1 FL — SIGNIFICANT CHANGE UP (ref 80–100)
METHOD TYPE: SIGNIFICANT CHANGE UP
NRBC # BLD: 0 /100 WBCS — SIGNIFICANT CHANGE UP (ref 0–0)
ORGANISM # SPEC MICROSCOPIC CNT: SIGNIFICANT CHANGE UP
ORGANISM # SPEC MICROSCOPIC CNT: SIGNIFICANT CHANGE UP
PLATELET # BLD AUTO: 257 K/UL — SIGNIFICANT CHANGE UP (ref 150–400)
POTASSIUM SERPL-MCNC: 4 MMOL/L — SIGNIFICANT CHANGE UP (ref 3.5–5.3)
POTASSIUM SERPL-SCNC: 4 MMOL/L — SIGNIFICANT CHANGE UP (ref 3.5–5.3)
RBC # BLD: 3.74 M/UL — LOW (ref 3.8–5.2)
RBC # FLD: 12.8 % — SIGNIFICANT CHANGE UP (ref 10.3–14.5)
SODIUM SERPL-SCNC: 139 MMOL/L — SIGNIFICANT CHANGE UP (ref 135–145)
SPECIMEN SOURCE: SIGNIFICANT CHANGE UP
WBC # BLD: 5.6 K/UL — SIGNIFICANT CHANGE UP (ref 3.8–10.5)
WBC # FLD AUTO: 5.6 K/UL — SIGNIFICANT CHANGE UP (ref 3.8–10.5)

## 2019-05-30 PROCEDURE — 99233 SBSQ HOSP IP/OBS HIGH 50: CPT

## 2019-05-30 RX ORDER — AMOXICILLIN 250 MG/5ML
875 SUSPENSION, RECONSTITUTED, ORAL (ML) ORAL
Refills: 0 | Status: DISCONTINUED | OUTPATIENT
Start: 2019-05-31 | End: 2019-05-31

## 2019-05-30 RX ORDER — AMPICILLIN SODIUM AND SULBACTAM SODIUM 250; 125 MG/ML; MG/ML
1.5 INJECTION, POWDER, FOR SUSPENSION INTRAMUSCULAR; INTRAVENOUS ONCE
Refills: 0 | Status: DISCONTINUED | OUTPATIENT
Start: 2019-05-30 | End: 2019-05-30

## 2019-05-30 RX ORDER — LACTOBACILLUS ACIDOPHILUS 100MM CELL
1 CAPSULE ORAL
Refills: 0 | Status: DISCONTINUED | OUTPATIENT
Start: 2019-05-30 | End: 2019-06-01

## 2019-05-30 RX ORDER — AMPICILLIN SODIUM AND SULBACTAM SODIUM 250; 125 MG/ML; MG/ML
1.5 INJECTION, POWDER, FOR SUSPENSION INTRAMUSCULAR; INTRAVENOUS ONCE
Refills: 0 | Status: COMPLETED | OUTPATIENT
Start: 2019-05-30 | End: 2019-05-30

## 2019-05-30 RX ORDER — AMPICILLIN SODIUM AND SULBACTAM SODIUM 250; 125 MG/ML; MG/ML
1 INJECTION, POWDER, FOR SUSPENSION INTRAMUSCULAR; INTRAVENOUS ONCE
Refills: 0 | Status: DISCONTINUED | OUTPATIENT
Start: 2019-05-30 | End: 2019-05-30

## 2019-05-30 RX ADMIN — FAMOTIDINE 20 MILLIGRAM(S): 10 INJECTION INTRAVENOUS at 17:09

## 2019-05-30 RX ADMIN — Medication 1 ENEMA: at 18:06

## 2019-05-30 RX ADMIN — CEFTRIAXONE 100 GRAM(S): 500 INJECTION, POWDER, FOR SOLUTION INTRAMUSCULAR; INTRAVENOUS at 12:57

## 2019-05-30 RX ADMIN — HYDROMORPHONE HYDROCHLORIDE 1 MILLIGRAM(S): 2 INJECTION INTRAMUSCULAR; INTRAVENOUS; SUBCUTANEOUS at 19:34

## 2019-05-30 RX ADMIN — Medication 400 MILLIGRAM(S): at 06:25

## 2019-05-30 RX ADMIN — Medication 200 MILLIGRAM(S): at 21:10

## 2019-05-30 RX ADMIN — ESCITALOPRAM OXALATE 20 MILLIGRAM(S): 10 TABLET, FILM COATED ORAL at 11:27

## 2019-05-30 RX ADMIN — Medication 30 MILLIGRAM(S): at 17:09

## 2019-05-30 RX ADMIN — Medication 400 MILLIGRAM(S): at 12:57

## 2019-05-30 RX ADMIN — Medication 1000 MILLIGRAM(S): at 01:15

## 2019-05-30 RX ADMIN — AMPICILLIN SODIUM AND SULBACTAM SODIUM 100 GRAM(S): 250; 125 INJECTION, POWDER, FOR SUSPENSION INTRAMUSCULAR; INTRAVENOUS at 21:21

## 2019-05-30 RX ADMIN — Medication 400 MILLIGRAM(S): at 00:32

## 2019-05-30 RX ADMIN — Medication 200 MILLIGRAM(S): at 05:06

## 2019-05-30 RX ADMIN — HYDROMORPHONE HYDROCHLORIDE 1 MILLIGRAM(S): 2 INJECTION INTRAMUSCULAR; INTRAVENOUS; SUBCUTANEOUS at 19:04

## 2019-05-30 RX ADMIN — Medication 1 MILLIGRAM(S): at 22:06

## 2019-05-30 RX ADMIN — Medication 200 MILLIGRAM(S): at 16:39

## 2019-05-30 RX ADMIN — FAMOTIDINE 20 MILLIGRAM(S): 10 INJECTION INTRAVENOUS at 05:06

## 2019-05-30 RX ADMIN — Medication 1000 MILLIGRAM(S): at 07:00

## 2019-05-30 RX ADMIN — Medication 30 MILLIGRAM(S): at 08:05

## 2019-05-30 RX ADMIN — SODIUM CHLORIDE 100 MILLILITER(S): 9 INJECTION, SOLUTION INTRAVENOUS at 04:56

## 2019-05-30 RX ADMIN — Medication 5 MILLIGRAM(S): at 21:09

## 2019-05-30 RX ADMIN — ONDANSETRON 4 MILLIGRAM(S): 8 TABLET, FILM COATED ORAL at 07:27

## 2019-05-30 RX ADMIN — ONDANSETRON 4 MILLIGRAM(S): 8 TABLET, FILM COATED ORAL at 19:06

## 2019-05-30 RX ADMIN — Medication 1 TABLET(S): at 20:30

## 2019-05-30 NOTE — PROGRESS NOTE ADULT - SUBJECTIVE AND OBJECTIVE BOX
The patient was interviewed and evaluated.  present  33y Female    T(C): 36.6 (05-30-19 @ 07:30), Max: 36.8 (05-29-19 @ 10:03)  HR: 98 (05-30-19 @ 07:30) (77 - 112)  BP: 111/75 (05-30-19 @ 07:30) (105/71 - 141/84)  RR: 16 (05-30-19 @ 07:30) (12 - 18)  SpO2: 98% (05-30-19 @ 07:30) (93% - 100%)  Wt(kg): --    Pt seen, doing well, no anesthesia complications or complaints noted or reported.   Has Nausea, taking anti-emetics prn    All questions answered    No additional recommendations.     Pain adequately controlled

## 2019-05-30 NOTE — PROGRESS NOTE ADULT - ASSESSMENT
32 yo F with above PMH presents with pain and nausea. Patient had CT showing Mild to moderate left-sided hydroureteronephrosis secondary to a 4 mm obstructing calculus mid ureter.

## 2019-05-30 NOTE — CHART NOTE - NSCHARTNOTEFT_GEN_A_CORE
Called by RN as Pt is wishing to speak with doctor.    Pt seen at bedside. Her mother and father are present at bedside. Pt states that she feels warm and has a burning sensation when she urinates. Most recently she did not have any hematuria but earlier did have "a little" blood in her urine. Pt states she has left sided flank pain that has moved to groin now. Pt also complains of abdominal cramping and nausea which is minimally relieved with zofran. Family and Pt asking if more imaging (CT, XR or U/S) would help. Explained to the family that the best modality would be CT which would likely demonstrate presence of a known renal calculus which would not give us much more information than we already have. Explained the Renal U/S may give some information but could be indeterminate and would require pressing on her side which could exacerbate her discomfort/pain. Also explained that Abd XR may be indeterminate depending on the type of stone she is passing and earlier XR was limited due to heavy stool burden. Family understood and agree that at this time imaging is not likely to provide much more information than we currently have.    Vital Signs (24 Hrs):  T(C): 37.1 (05-30-19 @ 20:23), Max: 37.5 (05-30-19 @ 16:09)  HR: 102 (05-30-19 @ 20:23) (80 - 105)  BP: 121/83 (05-30-19 @ 20:23) (105/71 - 130/86)  RR: 16 (05-30-19 @ 20:23) (16 - 18)  SpO2: 93% (05-30-19 @ 20:23) (93% - 98%)    Physical Exam:   General: appears uncomfortable  HEENT: NCAT, EOMI, MMM  CV: RRR, no murmurs appreciated, no edema  Resp: CTA B/L, no wheezes  Abdomen: soft, + left flank pain, + tender to palpation, non-distended     A/P: 34 yo F with above PMH presents with pain and nausea. Patient had CT showing Mild to moderate left-sided hydroureteronephrosis secondary to a 4 mm obstructing calculus mid ureter. POD 2 s/p left ureteral stent placement. Burning on urination possibly secondary to UTI, UCx positive for enterococcus. Hematuria likely secondary to recent instrumentation. Left flank/groin pain may be secondary to recent instrumentation vs passing of stone. Abdominal cramping possibly secondary to known IBS vs enema.    -currently afebrile, but will give her ice packs to put under her armpits  -on unasyn for + UCx with enterococcus  -Pt is POD 2 s/p ureteral stent, so some hematuria is to be expected  -Abd XR showing heavy stool burden, Pt received enema earlier today  -Pt is due for her donnatel, will give now to help with abdominal cramp  -will follow, RN to call with any changes

## 2019-05-30 NOTE — PROGRESS NOTE ADULT - SUBJECTIVE AND OBJECTIVE BOX
Patient is a 33y old  Female who presents with a chief complaint of flank pain, renal stone (30 May 2019 08:56)      FROM ADMISSION H+P:   HPI:  32 yo F with PMH of IBS, GERD, renal stones comes in with complaints of flank pain, nausea. Patient states this started yesterday pain 10/10. Patient came to ER had CT scan showing 4mm renal stone. Urology Dr. Mahajan consulted.   As per patient shes going to [facility] in a month for further work up of her IBS. (28 May 2019 14:24)      ----  INTERVAL HPI/OVERNIGHT EVENTS: Pt seen and evaluated at the bedside. No acute overnight events occurred. Pt still feels nauseated and is averse to eating. Pt is emotional and upset regarding nausea and constipation. She asked that I speak w/ outpatient GI which I did. No other complaints at this time. Afebrile. No other acute clinical changes. Feels some urgency and burning w/ urination. No flank pain. No suprapubic pain.     ----  PAST MEDICAL & SURGICAL HISTORY:  IBS (irritable bowel syndrome)  Anxiety  Cholelithiasis  S/P cholecystectomy  History of bilateral breast reduction surgery  S/P sinus surgery  S/P rhinoplasty      FAMILY HISTORY:  No pertinent family history in first degree relatives      Allergies    adhesives (Rash)  No Known Drug Allergies    Intolerances        ----  REVIEW OF SYSTEMS:  CONSTITUTIONAL: denies fever, chills, fatigue, weakness  HEENT: denies blurred vision, sore throat  SKIN: denies new lesions, rash  CARDIOVASCULAR: denies chest pain, chest pressure, palpitations  RESPIRATORY: denies shortness of breath, sputum production  GASTROINTESTINAL: as per HPI  GENITOURINARY: as per HPI  NEUROLOGICAL: denies numbness, headache, focal weakness  MUSCULOSKELETAL: denies new joint pain, muscle aches   LYMPHATICS: denies enlarged lymph nodes, extremity swelling      ----  PHYSICAL EXAM:  GENERAL: patient appears well, no acute distress, anxious appearing  EYES: sclera clear, no exudates  ENMT: oropharynx clear without erythema, moist mucous membranes  NECK: supple, soft, no thyromegaly noted  LUNGS: good air entry bilaterally, clear to auscultation, symmetric breath sounds, no wheezing or rhonchi appreciated  HEART: soft S1/S2, regular rate and rhythm, no murmurs noted, no noted edema to b/l LE  GASTROINTESTINAL: abdomen is soft, nontender, minimally distended throughout, hypoactive bowel sounds, no palpable masses  INTEGUMENT: good skin turgor, appropriate for ethnicity, appears well perfused, no jaundice noted  MUSCULOSKELETAL: no clubbing or cyanosis, no obvious deformity  NEUROLOGIC: awake, alert, oriented x3, good muscle tone in 4 extremities, no obvious sensory deficits  HEME/LYMPH: no palpable supraclavicular nodules, no obvious ecchymosis     T(C): 37.5 (05-30-19 @ 16:09), Max: 37.5 (05-30-19 @ 16:09)  HR: 105 (05-30-19 @ 16:09) (77 - 105)  BP: 130/86 (05-30-19 @ 16:09) (105/71 - 130/86)  RR: 16 (05-30-19 @ 16:09) (16 - 18)  SpO2: 95% (05-30-19 @ 16:09) (95% - 100%)  Wt(kg): --    ----  I&O's Summary    29 May 2019 07:01  -  30 May 2019 07:00  --------------------------------------------------------  IN: 2600 mL / OUT: 1400 mL / NET: 1200 mL    30 May 2019 07:01  -  30 May 2019 19:21  --------------------------------------------------------  IN: 0 mL / OUT: 450 mL / NET: -450 mL        LABS:                        10.8   5.60  )-----------( 257      ( 30 May 2019 05:18 )             33.7     05-30    139  |  106  |  5<L>  ----------------------------<  69<L>  4.0   |  24  |  0.61    Ca    8.3<L>      30 May 2019 05:18      PT/INR - ( 29 May 2019 10:08 )   PT: 11.7 sec;   INR: 1.03 ratio         PTT - ( 29 May 2019 10:08 )  PTT:21.8 sec    CAPILLARY BLOOD GLUCOSE          05-29 @ 21:06   No growth  --  --  05-28 @ 10:52   50,000 - 99,000 CFU/mL Enterococcus faecalis  --  Enterococcus faecalis ---- sensitive to all but doxy          Culture - Urine (collected 05-29-19 @ 21:06)  Source: .Urine Kidney  Preliminary Report (05-30-19 @ 17:12):    No growth- urine culture cleared after starting abx        ----  Personally reviewed:  Vital sign trends: [ x ] yes    [  ] no     [  ] n/a  Laboratory results: [ x ] yes    [  ] no     [  ] n/a  Radiology results: [  ] yes    [  ] no     [ x ] n/a  Culture results: [ x ] yes    [  ] no     [  ] n/a  Consultant recommendations: [ x ] yes    [  ] no     [  ] n/a

## 2019-05-30 NOTE — PROGRESS NOTE ADULT - PROBLEM SELECTOR PLAN 2
On rocephin now, sensitivities pending. If she feels better later today, she is urologically OK on abiots based on sensitivities. She should see Dr Dorsey after completion of antibiotics, to have repeat c&s and to be scheduled for definitive treatment of stone.

## 2019-05-30 NOTE — PROGRESS NOTE ADULT - SUBJECTIVE AND OBJECTIVE BOX
INTERVAL HPI/OVERNIGHT EVENTS: c/o abdominal pain and nausea, possibly 2/2 known IBS. Is 24hr s/p urteral stent insertion of stone, obstruction, and UTI. Afebrile    MEDICATIONS  (STANDING):  acetaminophen  IVPB .. 1000 milliGRAM(s) IV Intermittent once  cefTRIAXone   IVPB 1 Gram(s) IV Intermittent once  cefTRIAXone   IVPB 1 Gram(s) IV Intermittent every 24 hours  cefTRIAXone   IVPB      escitalopram 20 milliGRAM(s) Oral daily  famotidine Injectable 20 milliGRAM(s) IV Push two times a day  lactated ringers. 1000 milliLiter(s) (100 mL/Hr) IV Continuous <Continuous>  melatonin 5 milliGRAM(s) Oral at bedtime  phenazopyridine 200 milliGRAM(s) Oral three times a day    MEDICATIONS  (PRN):  clonazePAM  Tablet 1 milliGRAM(s) Oral daily PRN anxiety  Donnatal 1 Tablet(s) Oral every 8 hours PRN IBS symptoms  HYDROmorphone  Injectable 1 milliGRAM(s) IV Push every 2 hours PRN Severe Pain (7 - 10)  ketorolac   Injectable 15 milliGRAM(s) IV Push every 6 hours PRN Moderate Pain (4 - 6)  ketorolac   Injectable 30 milliGRAM(s) IV Push every 6 hours PRN Severe Pain (7 - 10)  ondansetron Injectable 4 milliGRAM(s) IV Push every 6 hours PRN Nausea and/or Vomiting  oxyCODONE    IR 5 milliGRAM(s) Oral every 4 hours PRN Moderate Pain (4 - 6)        Vital Signs Last 24 Hrs  T(C): 36.6 (30 May 2019 07:30), Max: 36.8 (29 May 2019 10:03)  T(F): 97.9 (30 May 2019 07:30), Max: 98.3 (29 May 2019 10:41)  HR: 98 (30 May 2019 07:30) (77 - 112)  BP: 111/75 (30 May 2019 07:30) (105/71 - 141/84)  BP(mean): --  RR: 16 (30 May 2019 07:30) (12 - 18)  SpO2: 98% (30 May 2019 07:30) (93% - 100%)        LABS:                        10.8   5.60  )-----------( 257      ( 30 May 2019 05:18 )             33.7         139  |  106  |  5<L>  ----------------------------<  69<L>  4.0   |  24  |  0.61    Ca    8.3<L>      30 May 2019 05:18    TPro  7.3  /  Alb  4.2  /  TBili  0.3  /  DBili  x   /  AST  16  /  ALT  28  /  AlkPhos  54      PT/INR - ( 29 May 2019 10:08 )   PT: 11.7 sec;   INR: 1.03 ratio         PTT - ( 29 May 2019 10:08 )  PTT:21.8 sec  Urinalysis Basic - ( 28 May 2019 09:30 )    Color: Yellow / Appearance: Clear / S.015 / pH: x  Gluc: x / Ketone: Trace  / Bili: Small / Urobili: Negative   Blood: x / Protein: 25 mg/dL / Nitrite: Negative   Leuk Esterase: Trace / RBC: >50 /HPF / WBC 0-2   Sq Epi: x / Non Sq Epi: Few / Bacteria: Occasional      Urine culture:   @ 10:52 --   50,000 - 99,000 CFU/mL Enterococcus species

## 2019-05-31 DIAGNOSIS — K58.9 IRRITABLE BOWEL SYNDROME WITHOUT DIARRHEA: ICD-10-CM

## 2019-05-31 LAB
ANION GAP SERPL CALC-SCNC: 9 MMOL/L — SIGNIFICANT CHANGE UP (ref 5–17)
BUN SERPL-MCNC: 10 MG/DL — SIGNIFICANT CHANGE UP (ref 7–23)
CALCIUM SERPL-MCNC: 8.2 MG/DL — LOW (ref 8.5–10.1)
CHLORIDE SERPL-SCNC: 106 MMOL/L — SIGNIFICANT CHANGE UP (ref 96–108)
CO2 SERPL-SCNC: 24 MMOL/L — SIGNIFICANT CHANGE UP (ref 22–31)
CREAT SERPL-MCNC: 0.5 MG/DL — SIGNIFICANT CHANGE UP (ref 0.5–1.3)
CULTURE RESULTS: SIGNIFICANT CHANGE UP
GLUCOSE SERPL-MCNC: 61 MG/DL — LOW (ref 70–99)
HCT VFR BLD CALC: 33.6 % — LOW (ref 34.5–45)
HGB BLD-MCNC: 10.8 G/DL — LOW (ref 11.5–15.5)
MCHC RBC-ENTMCNC: 28.9 PG — SIGNIFICANT CHANGE UP (ref 27–34)
MCHC RBC-ENTMCNC: 32.1 GM/DL — SIGNIFICANT CHANGE UP (ref 32–36)
MCV RBC AUTO: 89.8 FL — SIGNIFICANT CHANGE UP (ref 80–100)
NRBC # BLD: 0 /100 WBCS — SIGNIFICANT CHANGE UP (ref 0–0)
PLATELET # BLD AUTO: 244 K/UL — SIGNIFICANT CHANGE UP (ref 150–400)
POTASSIUM SERPL-MCNC: 3.5 MMOL/L — SIGNIFICANT CHANGE UP (ref 3.5–5.3)
POTASSIUM SERPL-SCNC: 3.5 MMOL/L — SIGNIFICANT CHANGE UP (ref 3.5–5.3)
RBC # BLD: 3.74 M/UL — LOW (ref 3.8–5.2)
RBC # FLD: 13 % — SIGNIFICANT CHANGE UP (ref 10.3–14.5)
SODIUM SERPL-SCNC: 139 MMOL/L — SIGNIFICANT CHANGE UP (ref 135–145)
SPECIMEN SOURCE: SIGNIFICANT CHANGE UP
WBC # BLD: 6.55 K/UL — SIGNIFICANT CHANGE UP (ref 3.8–10.5)
WBC # FLD AUTO: 6.55 K/UL — SIGNIFICANT CHANGE UP (ref 3.8–10.5)

## 2019-05-31 PROCEDURE — 74018 RADEX ABDOMEN 1 VIEW: CPT | Mod: 26

## 2019-05-31 PROCEDURE — 99233 SBSQ HOSP IP/OBS HIGH 50: CPT

## 2019-05-31 PROCEDURE — 93010 ELECTROCARDIOGRAM REPORT: CPT

## 2019-05-31 RX ORDER — PANTOPRAZOLE SODIUM 20 MG/1
40 TABLET, DELAYED RELEASE ORAL EVERY 12 HOURS
Refills: 0 | Status: DISCONTINUED | OUTPATIENT
Start: 2019-05-31 | End: 2019-05-31

## 2019-05-31 RX ORDER — ZALEPLON 10 MG
5 CAPSULE ORAL AT BEDTIME
Refills: 0 | Status: DISCONTINUED | OUTPATIENT
Start: 2019-05-31 | End: 2019-06-01

## 2019-05-31 RX ORDER — PROCHLORPERAZINE MALEATE 5 MG
10 TABLET ORAL ONCE
Refills: 0 | Status: DISCONTINUED | OUTPATIENT
Start: 2019-05-31 | End: 2019-06-01

## 2019-05-31 RX ORDER — SUCRALFATE 1 G
1 TABLET ORAL EVERY 12 HOURS
Refills: 0 | Status: DISCONTINUED | OUTPATIENT
Start: 2019-05-31 | End: 2019-06-01

## 2019-05-31 RX ORDER — PROCHLORPERAZINE MALEATE 5 MG
10 TABLET ORAL ONCE
Refills: 0 | Status: DISCONTINUED | OUTPATIENT
Start: 2019-05-31 | End: 2019-05-31

## 2019-05-31 RX ORDER — FAMOTIDINE 10 MG/ML
20 INJECTION INTRAVENOUS EVERY 12 HOURS
Refills: 0 | Status: DISCONTINUED | OUTPATIENT
Start: 2019-05-31 | End: 2019-06-01

## 2019-05-31 RX ORDER — SODIUM CHLORIDE 9 MG/ML
1000 INJECTION INTRAMUSCULAR; INTRAVENOUS; SUBCUTANEOUS ONCE
Refills: 0 | Status: COMPLETED | OUTPATIENT
Start: 2019-05-31 | End: 2019-05-31

## 2019-05-31 RX ORDER — AMOXICILLIN 250 MG/5ML
500 SUSPENSION, RECONSTITUTED, ORAL (ML) ORAL THREE TIMES A DAY
Refills: 0 | Status: DISCONTINUED | OUTPATIENT
Start: 2019-05-31 | End: 2019-06-01

## 2019-05-31 RX ADMIN — Medication 1 TABLET(S): at 21:23

## 2019-05-31 RX ADMIN — ONDANSETRON 4 MILLIGRAM(S): 8 TABLET, FILM COATED ORAL at 08:39

## 2019-05-31 RX ADMIN — Medication 200 MILLIGRAM(S): at 14:00

## 2019-05-31 RX ADMIN — ESCITALOPRAM OXALATE 20 MILLIGRAM(S): 10 TABLET, FILM COATED ORAL at 11:30

## 2019-05-31 RX ADMIN — HYDROMORPHONE HYDROCHLORIDE 1 MILLIGRAM(S): 2 INJECTION INTRAMUSCULAR; INTRAVENOUS; SUBCUTANEOUS at 02:29

## 2019-05-31 RX ADMIN — ONDANSETRON 4 MILLIGRAM(S): 8 TABLET, FILM COATED ORAL at 02:32

## 2019-05-31 RX ADMIN — Medication 5 MILLIGRAM(S): at 23:27

## 2019-05-31 RX ADMIN — Medication 1 TABLET(S): at 11:30

## 2019-05-31 RX ADMIN — HYDROMORPHONE HYDROCHLORIDE 1 MILLIGRAM(S): 2 INJECTION INTRAMUSCULAR; INTRAVENOUS; SUBCUTANEOUS at 02:59

## 2019-05-31 RX ADMIN — Medication 1 TABLET(S): at 17:30

## 2019-05-31 RX ADMIN — FAMOTIDINE 20 MILLIGRAM(S): 10 INJECTION INTRAVENOUS at 05:56

## 2019-05-31 RX ADMIN — Medication 1 GRAM(S): at 17:30

## 2019-05-31 RX ADMIN — Medication 200 MILLIGRAM(S): at 05:55

## 2019-05-31 RX ADMIN — Medication 1 TABLET(S): at 06:15

## 2019-05-31 RX ADMIN — Medication 1 MILLIGRAM(S): at 12:25

## 2019-05-31 RX ADMIN — ONDANSETRON 4 MILLIGRAM(S): 8 TABLET, FILM COATED ORAL at 21:23

## 2019-05-31 RX ADMIN — Medication 1 TABLET(S): at 09:00

## 2019-05-31 RX ADMIN — Medication 500 MILLIGRAM(S): at 21:22

## 2019-05-31 RX ADMIN — SODIUM CHLORIDE 1000 MILLILITER(S): 9 INJECTION INTRAMUSCULAR; INTRAVENOUS; SUBCUTANEOUS at 09:08

## 2019-05-31 RX ADMIN — FAMOTIDINE 20 MILLIGRAM(S): 10 INJECTION INTRAVENOUS at 17:30

## 2019-05-31 RX ADMIN — Medication 200 MILLIGRAM(S): at 21:22

## 2019-05-31 NOTE — PROGRESS NOTE ADULT - PROBLEM SELECTOR PLAN 5
VTE ppx not indicated, pt ambulating  GI ppx on pepcid bid

## 2019-05-31 NOTE — PROGRESS NOTE ADULT - PROBLEM SELECTOR PLAN 2
No sign of bacteremia or worsening infection. Ampicillin may be better tolerated than augmentin, will adjust meds

## 2019-05-31 NOTE — PROGRESS NOTE ADULT - PROBLEM SELECTOR PLAN 4
- management as above  - consider abd XR tomorrow if no BM
- supportive care  - heavy stool burden on imaging, may need bowel prep vs. enema, continue home meds for now
- management as above

## 2019-05-31 NOTE — PROGRESS NOTE ADULT - PROBLEM SELECTOR PROBLEM 3
Irritable bowel syndrome with constipation
Enterococcus faecalis infection
Irritable bowel syndrome with constipation

## 2019-05-31 NOTE — PROGRESS NOTE ADULT - ATTENDING COMMENTS
The tx plan was discussed in detail with the patient and family members at the bedside. Their questions and concerns were addressed to the best of my ability. They are in agreement with the plan as detailed above. They demonstrated adequate understanding of the counseling which I have provided.

## 2019-05-31 NOTE — PROGRESS NOTE ADULT - SUBJECTIVE AND OBJECTIVE BOX
INTERVAL HPI/OVERNIGHT EVENTS: c/o LLQ pain with voiding. Minimal BM after enema. afebrile, on augmentin    MEDICATIONS  (STANDING):  amoxicillin  875 milliGRAM(s)/clavulanate 1 Tablet(s) Oral every 12 hours  escitalopram 20 milliGRAM(s) Oral daily  famotidine Injectable 20 milliGRAM(s) IV Push two times a day  lactobacillus acidophilus 1 Tablet(s) Oral three times a day with meals  melatonin 5 milliGRAM(s) Oral at bedtime  phenazopyridine 200 milliGRAM(s) Oral three times a day    MEDICATIONS  (PRN):  aluminum hydroxide/magnesium hydroxide/simethicone Suspension 30 milliLiter(s) Oral every 4 hours PRN Dyspepsia  clonazePAM  Tablet 1 milliGRAM(s) Oral daily PRN anxiety  Donnatal 1 Tablet(s) Oral every 8 hours PRN IBS symptoms  HYDROmorphone  Injectable 1 milliGRAM(s) IV Push every 2 hours PRN Severe Pain (7 - 10)  ketorolac   Injectable 15 milliGRAM(s) IV Push every 6 hours PRN Moderate Pain (4 - 6)  ketorolac   Injectable 30 milliGRAM(s) IV Push every 6 hours PRN Severe Pain (7 - 10)  ondansetron Injectable 4 milliGRAM(s) IV Push every 6 hours PRN Nausea and/or Vomiting  oxyCODONE    IR 5 milliGRAM(s) Oral every 4 hours PRN Moderate Pain (4 - 6)        Vital Signs Last 24 Hrs  T(C): 36.8 (31 May 2019 02:42), Max: 37.5 (30 May 2019 16:09)  T(F): 98.3 (31 May 2019 02:42), Max: 99.5 (30 May 2019 16:09)  HR: 91 (31 May 2019 02:42) (91 - 105)  BP: 116/84 (31 May 2019 02:42) (111/75 - 130/86)  BP(mean): --  RR: 17 (31 May 2019 02:42) (16 - 17)  SpO2: 92% (31 May 2019 02:42) (92% - 98%)    PHYSICAL EXAM:    ABDOMEN: soft, LLQ tenderness to minimal palpation, no rebound or guarding    LABS:                        10.8   6.55  )-----------( 244      ( 31 May 2019 05:50 )             33.6     05-31    139  |  106  |  10  ----------------------------<  61<L>  3.5   |  24  |  0.50    Ca    8.2<L>      31 May 2019 05:50      PT/INR - ( 29 May 2019 10:08 )   PT: 11.7 sec;   INR: 1.03 ratio         PTT - ( 29 May 2019 10:08 )  PTT:21.8 sec    Urine culture:  05-29 @ 21:06 --   No growth  Urine culture:  05-28 @ 10:52 --   50,000 - 99,000 CFU/mL Enterococcus faecalis

## 2019-05-31 NOTE — PROGRESS NOTE ADULT - SUBJECTIVE AND OBJECTIVE BOX
FULL NOTE TO FOLLOW:  intractable nausea and vomiting  depression, psych eval requested  added carafate and compazine  d/c planning FULL NOTE TO FOLLOW:  intractable nausea and vomiting  depression, psych eval requested  added carafate and compazine  d/c planning    ----  Patient is a 33y old  Female who presents with a chief complaint of flank pain, renal stone (31 May 2019 19:16)      FROM ADMISSION H+P:   HPI:  32 yo F with PMH of IBS, GERD, renal stones comes in with complaints of flank pain, nausea. Patient states this started yesterday pain 10/10. Patient came to ER had CT scan showing 4mm renal stone. Urology Dr. Mahajan consulted.   As per patient shes going to [facility] in a month for further work up of her IBS. (28 May 2019 14:24)      ----  INTERVAL HPI/OVERNIGHT EVENTS: Pt seen and evaluated at the bedside. No acute overnight events occurred. Pt had a "horrible night" she experienced intractable nausea, she experienced LLQ abd pain which she describes as severe. Zofran has offered minimal relief of her symptoms. Pt insists theres a problem with her stent. Denies hematuria. Denies urinary urgency or frequency.     ----  PAST MEDICAL & SURGICAL HISTORY:  IBS (irritable bowel syndrome)  Anxiety  Cholelithiasis  S/P cholecystectomy  History of bilateral breast reduction surgery  S/P sinus surgery  S/P rhinoplasty      FAMILY HISTORY:  No pertinent family history in first degree relatives      Allergies    adhesives (Rash)  No Known Drug Allergies    Intolerances        ----  REVIEW OF SYSTEMS:  CONSTITUTIONAL: denies fever, chills, fatigue, weakness  HEENT: denies blurred vision, sore throat  SKIN: denies new lesions, rash  CARDIOVASCULAR: denies chest pain, chest pressure, palpitations  RESPIRATORY: denies shortness of breath, sputum production  NEUROLOGICAL: denies numbness, headache, focal weakness  MUSCULOSKELETAL: denies new joint pain, muscle aches   LYMPHATICS: denies enlarged lymph nodes, extremity swelling    10 systems reviewed and negative unless otherwise noted     ----  PHYSICAL EXAM:  GENERAL: patient appears uncomfortable, dry heaving during our interaction, crying, no acute distress  EYES: sclera clear, no exudates  ENMT: oropharynx clear without erythema, moist mucous membranes  NECK: supple, soft, no thyromegaly noted  LUNGS: good air entry bilaterally, clear to auscultation, symmetric breath sounds, no wheezing or rhonchi appreciated  HEART: soft S1/S2, regular rate and rhythm, no murmurs noted, no noted edema to b/l LE  GASTROINTESTINAL: abdomen is soft, nontender, no distension, normoactive bowel sounds, no palpable masses  INTEGUMENT: good skin turgor, appropriate for ethnicity, appears well perfused, no jaundice noted  MUSCULOSKELETAL: no clubbing or cyanosis, no obvious deformity  NEUROLOGIC: awake, alert, oriented x3, good muscle tone in 4 extremities, no obvious sensory deficits    T(C): 37.1 (05-31-19 @ 20:12), Max: 37.1 (05-31-19 @ 16:11)  HR: 88 (05-31-19 @ 20:12) (88 - 112)  BP: 126/85 (05-31-19 @ 20:12) (114/78 - 126/85)  RR: 17 (05-31-19 @ 20:12) (16 - 18)  SpO2: 95% (05-31-19 @ 20:12) (92% - 96%)  Wt(kg): --    ----  I&O's Summary    30 May 2019 07:01  -  31 May 2019 07:00  --------------------------------------------------------  IN: 50 mL / OUT: 450 mL / NET: -400 mL    31 May 2019 07:01  -  31 May 2019 21:35  --------------------------------------------------------  IN: 1000 mL / OUT: 0 mL / NET: 1000 mL        LABS:                        10.8   6.55  )-----------( 244      ( 31 May 2019 05:50 )             33.6     05-31    139  |  106  |  10  ----------------------------<  61<L>  3.5   |  24  |  0.50    Ca    8.2<L>      31 May 2019 05:50          CAPILLARY BLOOD GLUCOSE          05-29 @ 21:06   No growth at 48 hours  --  --  05-28 @ 10:52   50,000 - 99,000 CFU/mL Enterococcus faecalis  --  Enterococcus faecalis

## 2019-05-31 NOTE — PROGRESS NOTE ADULT - NSHPATTENDINGPLANDISCUSS_GEN_ALL_CORE
pt, family @ bedside, RN, outpatient GI attending - re: tx plan, dispo planning
pt, family @ bedside, RN, urology attending - re: tx plan, dispo planning, possible OR
pt, family @ bedside, RN, outpatient GI attending - re: tx plan, dispo planning

## 2019-05-31 NOTE — PROGRESS NOTE ADULT - PROBLEM SELECTOR PLAN 3
- supportive care  - heavy stool burden on imaging, may need bowel prep vs. enema, continue home meds for now  - fleet enema today  - spoke w/ outpatient GI, agreed w/ this recommendation
- on ceftriaxone, 50-99k colonies, likely to recommend transition to po abx once sensitivites result, if it is multidrug resistant, will consult ID
- supportive care  - heavy stool burden on imaging, may need bowel prep vs. enema, continue home meds for now  - fleet enema today  - spoke w/ outpatient GI, agreed w/ this recommendation  - compazine started  - ppi bid started  - carafate started  - GI consulted today (Kolby)

## 2019-05-31 NOTE — CONSULT NOTE ADULT - PROBLEM SELECTOR RECOMMENDATION 9
likely multifactorial   IVF  continue zofran and compezine atc  ppi ivpb q12  OOB  minimize opioids   diet as tolerated

## 2019-05-31 NOTE — PROGRESS NOTE ADULT - PROBLEM/PLAN-5
Nurse's Notes                                                                                     

 [facility]                                                                

Name: Selina Arenas                                                                               

Age: 66 yrs                                                                                       

Sex: Female                                                                                       

: 1952                                                                                   

MRN: [id number]                                                                                   

Arrival Date: 2019                                                                          

Time: 18:59                                                                                       

Account#: [id number]                                                                            

Bed 5                                                                                             

Private MD:                                                                                       

Diagnosis: Cellulitis of right lower limb;Sepsis;Open wound of foot                               

                                                                                                  

Presentation:                                                                                     

                                                                                             

19:06 Presenting complaint: Patient states: "My doctor told me to come here and get admitted. jd3 

      I had a surgery on my infected right foot and I have had pain since then.". Transition      

      of care: patient was not received from another setting of care. Onset of symptoms was       

      2019. Risk Assessment: Do you want to hurt yourself or someone else? Patient      

      reports no desire to harm self or others. Initial Sepsis Screen: Does the patient meet      

      any 2 criteria? No. Patient's initial sepsis screen is negative. Does the patient have      

      a suspected source of infection? No. Patient's initial sepsis screen is negative. Care      

      prior to arrival: Medication(s) given: Tylenol, taken at 1600.                              

19:06 Method Of Arrival: Wheelchair                                                           jd3 

19:06 Acuity: EDITH 2                                                                           jd3 

                                                                                                  

Historical:                                                                                       

- Allergies:                                                                                      

19:13 Ciprofloxacin; "makes my mouth dry";                                                    jd3 

- Home Meds:                                                                                      

19:13 Clonazepam Oral [Active]; metformin 500 mg Oral tab 1 tab 2 times per day [Active];     jd3 

      triamterene-hydrochlorothiazid 75-50 mg Oral tab 1 tab once daily [Active];                 

- PMHx:                                                                                           

19:13 Anxiety; Diabetes - NIDDM; psoriasis;                                                   jd3 

- PSHx:                                                                                           

19:13 ; Tonsillectomy; right foot;                                                   jd3 

                                                                                                  

- Immunization history:: Adult Immunizations up to date.                                          

- Social history:: Smoking status: Patient uses tobacco products, smokes one-half pack            

  cigarettes per day.                                                                             

- Ebola Screening: : Patient negative for fever greater than or equal to 101.5 degrees            

  Fahrenheit, and additional compatible Ebola Virus Disease symptoms.                             

                                                                                                  

                                                                                                  

Screenin:45 Abuse screen: Denies threats or abuse. Nutritional screening: No deficits noted.        ea  

      Tuberculosis screening: No symptoms or risk factors identified. Fall Risk Fall in past      

      12 months (25 points). Secondary diagnosis (15 points) impaired mobility, IV access (20     

      points).                                                                                    

                                                                                                  

Assessment:                                                                                       

19:55 General: Appears uncomfortable, Behavior is calm, cooperative, appropriate for age.     ea  

      Pain: Complains of pain in right foot Pain currently is 4 out of 10 on a pain scale.        

      Quality of pain is described as aching. Neuro: Level of Consciousness is awake, alert,      

      obeys commands, Oriented to person, place, time, situation. Cardiovascular: Patient's       

      skin is warm and dry. Respiratory: Airway is patent Respiratory effort is even,             

      unlabored, Respiratory pattern is regular, symmetrical. GI: No signs and/or symptoms        

      were reported involving the gastrointestinal system. : No signs and/or symptoms were      

      reported regarding the genitourinary system. Derm: Decubitus located on right heel(s)       

      approximately > 20 cm bed has eschar present bed has fibrin present is draining             

      moderate amount malodorous, purulent.                                                       

20:30 Reassessment: Patient and/or family updated on plan of care and expected duration. Pain ea  

      level reassessed. Patient is alert, oriented x 3, equal unlabored respirations, skin        

      warm/dry/pink.                                                                              

21:50 Reassessment: Patient and/or family updated on plan of care and expected duration. Pain ea  

      level reassessed. Patient is alert, oriented x 3, equal unlabored respirations, skin        

      warm/dry/pink.                                                                              

22:04 Reassessment: Patient and/or family updated on plan of care and expected duration. Pain ea  

      level reassessed. Patient is alert, oriented x 3, equal unlabored respirations, skin        

      warm/dry/pink. Awaiting on room assignment.                                                 

22:43 Reassessment: Patient and/or family updated on plan of care and expected duration. Pain ea  

      level reassessed. Attempted to urinate, pt reports she is unable to give urine sample       

      at this time. Refused straight cath. Will notify receiving nurse on second floor.           

                                                                                                  

Vital Signs:                                                                                      

19:08  / 70; Pulse 138; Resp 19 S; Temp 101.1(O); Pulse Ox 94% on R/A; Weight 113.4 kg  jd3 

      (R); Height 5 ft. 10 in. (177.80 cm) (R); Pain 4/10;                                        

20:54  / 62; Pulse 112; Resp 19; Temp 101.5(O); Pulse Ox 95% ;                          ea  

21:58  / 73; Pulse 99; Resp 18; Pulse Ox 96% on R/A;                                    ea  

22:03 Temp 99.1(O);                                                                           ea  

22:46  / 67; Pulse 88; Resp 18; Pulse Ox 96% ;                                          ea  

19:08 Body Mass Index 35.87 (113.40 kg, 177.80 cm)                                            jd3 

                                                                                                  

ED Course:                                                                                        

18:59 Patient arrived in ED.                                                                  mr  

19:08 Triage completed.                                                                       jd3 

19:13 Arm band placed on.                                                                     jd3 

19:37 Rell Mathis PA is PHCP.                                                               [id number] 

19:37 Huey Bangura MD is Attending Physician.                                             [id number] 

19:37 Komal Kramer RN is Primary Nurse.                                                    ea  

19:55 Patient has correct armband on for positive identification. Bed in low position. Call   ea  

      light in reach. Side rails up X2.                                                           

20:56 Kolby Jeronimo MD is Hospitalizing Provider.                                           [id number] 

21:23 Inserted saline lock: 20 gauge in right hand, using aseptic technique.                  tl2 

21:24 Inserted saline lock: 20 gauge in right antecubital area, using aseptic technique.      tl2 

      Blood collected. placed by umesh King.                                                   

22:32 No provider procedures requiring assistance completed. Patient admitted, IV remains in  tl2 

      place.                                                                                      

22:32 IV was discontinued by the patient. 20 g R AC.                                          tl2 

                                                                                                  

Administered Medications:                                                                         

20:20 Drug: Tylenol 1000 mg Route: PO;                                                        ea  

22:04 Follow up: Response: No adverse reaction; Temperature is decreased                      ea  

20:42 Drug: NS 0.9% 1000 ml Route: IV; Rate: 1000 ml; Site: right antecubital;                ea  

21:50 Follow up: IV Status: Completed infusion; IV Intake: 1000ml                             ea  

21:16 Drug: NS 0.9% 1000 ml Route: IV; Rate: 1000 ml; Site: right hand;                       ea  

22:33 Follow up: IV Status: Completed infusion; IV Intake: 1000ml                             tl2 

21:23 Drug: Zosyn 3.375 grams Route: IVPB; Infused Over: 60 mins; Site: right hand;           ea  

22:33 Follow up: IV Status: Completed infusion; IV Intake: 100ml                              tl2 

22:42 Follow up: Response: No adverse reaction; IV Status: Completed infusion                 ea  

22:43 Drug: vancoMYCIN 1 grams Route: IVPB; Infused Over: 2 hrs; Site: right hand;            ea  

22:47 Follow up: Response: No adverse reaction; IV Status: Infusion continued upon admission  ea  

                                                                                                  

                                                                                                  

Point of Care Testing:                                                                            

      Blood Glucose:                                                                              

20:45 Blood Glucose: 184 mg/dL;                                                               ea  

      Ranges:                                                                                     

                                                                                                  

Intake:                                                                                           

21:50 IV: 1000ml; Total: 1000ml.                                                              ea  

22:33 IV: 100ml; Total: 1100ml.                                                               tl2 

22:33 IV: 1000ml; Total: 2100ml.                                                              tl2 

                                                                                                  

Outcome:                                                                                          

20:57 Decision to Hospitalize by Provider.                                                    pam 

22:32 Admitted to Med/surg accompanied by tech, family with patient, via wheelchair, room     tl2 

      222, with chart, Report called to  MIKHAIL Humphreys                                             

22:32 Condition: stable                                                                           

22:32 Discharge instructions given to patient, family, Instructed on the need for admit.          

22:57 Patient left the ED.                                                                    ea  

                                                                                                  

Signatures:                                                                                       

Amelie Craven                                 mr MathisRell PA PA   jr8                                                  

Sidra Powell RN                        RN   tl2                                                  

Komal Kramer RN RN ea Davies, Jonathon, RN                    RN   jd3                                                  

                                                                                                  

Corrections: (The following items were deleted from the chart)                                    

19:15 19:06 Presenting complaint: Patient states: "I had a surgery on my infected right foot  jd3 

      and I have had pain since then." jd3                                                        

19:15 19:06 Acuity: EDITH 3 jd3                                                                 jd3 

22:48 22:43 Reassessment: Patient and/or family updated on plan of care and expected          ea  

      duration. Pain level reassessed. Attempted to urinate, pt reports she is unable to give     

      urine sample at this time. Refused straight cath. ea                                        

                                                                                                  

**************************************************************************************************
DISPLAY PLAN FREE TEXT

## 2019-05-31 NOTE — CONSULT NOTE ADULT - SUBJECTIVE AND OBJECTIVE BOX
Chief Complaint:  Patient is a 33y old  Female who presents with a chief complaint of flank pain, renal stone (31 May 2019 06:12)    IBS (irritable bowel syndrome)  Anxiety  Cholelithiasis  S/P cholecystectomy  History of bilateral breast reduction surgery  S/P sinus surgery  S/P rhinoplasty     HPI:  32 yo F with PMH of IBS, GERD, renal stones comes in with complaints of flank pain, nausea. Patient states this started yesterday pain 10/10. Patient came to ER had CT scan showing 4mm renal stone. Urology Dr. Mahajan consulted.   As per patient shes going to [facility] in a month for further work up of her IBS. (28 May 2019 14:24)      adhesives (Rash)  No Known Drug Allergies      aluminum hydroxide/magnesium hydroxide/simethicone Suspension 30 milliLiter(s) Oral every 4 hours PRN  amoxicillin 500 milliGRAM(s) Oral three times a day  clonazePAM  Tablet 1 milliGRAM(s) Oral daily PRN  Donnatal 1 Tablet(s) Oral every 8 hours PRN  escitalopram 20 milliGRAM(s) Oral daily  famotidine Injectable 20 milliGRAM(s) IV Push two times a day  HYDROmorphone  Injectable 1 milliGRAM(s) IV Push every 2 hours PRN  ketorolac   Injectable 15 milliGRAM(s) IV Push every 6 hours PRN  ketorolac   Injectable 30 milliGRAM(s) IV Push every 6 hours PRN  lactobacillus acidophilus 1 Tablet(s) Oral three times a day with meals  melatonin 5 milliGRAM(s) Oral at bedtime  ondansetron Injectable 4 milliGRAM(s) IV Push every 6 hours PRN  oxyCODONE    IR 5 milliGRAM(s) Oral every 4 hours PRN  phenazopyridine 200 milliGRAM(s) Oral three times a day  prochlorperazine   IVPB 10 milliGRAM(s) IV Intermittent once PRN        FAMILY HISTORY:  No pertinent family history in first degree relatives        Review of Systems:    General:  No wt loss, fevers, chills, night sweats,fatigue,   Eyes:  Good vision, no reported pain  ENT:  No sore throat, pain, runny nose, dysphagia  CV:  No pain, palpitatioins, hypo/hypertension  Resp:  No dyspnea, cough, tachypnea, wheezing  :  No pain, bleeding, incontinence, nocturia  Muscle:  No pain, weakness  Neuro:  No weakness, tingling, memory problems  Psych:  No fatigue, insomnia, mood problems, depression  Endocrine:  No polyuria, polydypsia, cold/heat intolerance  Heme:  No petechiae, ecchymosis, easy bruisability  Skin:  No rash, tattoos, scars, edema    Relevant Family History:       Relevant Social History:       Physical Exam:    Vital Signs:  Vital Signs Last 24 Hrs  T(C): 36.6 (31 May 2019 06:25), Max: 37.5 (30 May 2019 16:09)  T(F): 97.9 (31 May 2019 06:25), Max: 99.5 (30 May 2019 16:09)  HR: 101 (31 May 2019 06:25) (91 - 105)  BP: 117/83 (31 May 2019 06:25) (114/82 - 130/86)  BP(mean): --  RR: 18 (31 May 2019 06:25) (16 - 18)  SpO2: 95% (31 May 2019 06:25) (92% - 95%)  Daily     Daily     General:  Appears stated age, well-groomed, well-nourished, no distress  HEENT:  NC/AT,  conjunctivae clear and pink, no thyromegaly, nodules, adenopathy, no JVD  Chest:  Full & symmetric excursion, no increased effort, breath sounds clear  Cardiovascular:  Regular rhythm, S1, S2, no murmur/rub/S3/S4, no abdominal bruit, no edema  Abdomen:  Soft, non-tender, non-distended, normoactive bowel sounds,  no masses ,no hepatosplenomeagaly, no signs of chronic liver disease  Extremities:  no cyanosis,clubbing or edema  Skin:  No rash/erythema/ecchymoses/petechiae/wounds/abscess/warm/dry  Neuro/Psych:  Alert, oriented, no asterixis, no tremor, no encephalopathy    Laboratory:                            10.8   6.55  )-----------( 244      ( 31 May 2019 05:50 )             33.6     05-31    139  |  106  |  10  ----------------------------<  61<L>  3.5   |  24  |  0.50    Ca    8.2<L>      31 May 2019 05:50              Imaging:

## 2019-06-01 ENCOUNTER — TRANSCRIPTION ENCOUNTER (OUTPATIENT)
Age: 34
End: 2019-06-01

## 2019-06-01 VITALS
OXYGEN SATURATION: 95 % | TEMPERATURE: 98 F | SYSTOLIC BLOOD PRESSURE: 112 MMHG | RESPIRATION RATE: 18 BRPM | DIASTOLIC BLOOD PRESSURE: 74 MMHG | HEART RATE: 92 BPM

## 2019-06-01 LAB
ANION GAP SERPL CALC-SCNC: 9 MMOL/L — SIGNIFICANT CHANGE UP (ref 5–17)
BUN SERPL-MCNC: 6 MG/DL — LOW (ref 7–23)
CALCIUM SERPL-MCNC: 8.2 MG/DL — LOW (ref 8.5–10.1)
CHLORIDE SERPL-SCNC: 105 MMOL/L — SIGNIFICANT CHANGE UP (ref 96–108)
CO2 SERPL-SCNC: 25 MMOL/L — SIGNIFICANT CHANGE UP (ref 22–31)
CREAT SERPL-MCNC: 0.46 MG/DL — LOW (ref 0.5–1.3)
GLUCOSE SERPL-MCNC: 64 MG/DL — LOW (ref 70–99)
POTASSIUM SERPL-MCNC: 3.6 MMOL/L — SIGNIFICANT CHANGE UP (ref 3.5–5.3)
POTASSIUM SERPL-SCNC: 3.6 MMOL/L — SIGNIFICANT CHANGE UP (ref 3.5–5.3)
SODIUM SERPL-SCNC: 139 MMOL/L — SIGNIFICANT CHANGE UP (ref 135–145)

## 2019-06-01 PROCEDURE — 83605 ASSAY OF LACTIC ACID: CPT

## 2019-06-01 PROCEDURE — 84702 CHORIONIC GONADOTROPIN TEST: CPT

## 2019-06-01 PROCEDURE — 99239 HOSP IP/OBS DSCHRG MGMT >30: CPT

## 2019-06-01 PROCEDURE — 80048 BASIC METABOLIC PNL TOTAL CA: CPT

## 2019-06-01 PROCEDURE — 99285 EMERGENCY DEPT VISIT HI MDM: CPT | Mod: 25

## 2019-06-01 PROCEDURE — C1758: CPT

## 2019-06-01 PROCEDURE — 93005 ELECTROCARDIOGRAM TRACING: CPT

## 2019-06-01 PROCEDURE — 71045 X-RAY EXAM CHEST 1 VIEW: CPT

## 2019-06-01 PROCEDURE — 96375 TX/PRO/DX INJ NEW DRUG ADDON: CPT

## 2019-06-01 PROCEDURE — 85730 THROMBOPLASTIN TIME PARTIAL: CPT

## 2019-06-01 PROCEDURE — 85610 PROTHROMBIN TIME: CPT

## 2019-06-01 PROCEDURE — 76000 FLUOROSCOPY <1 HR PHYS/QHP: CPT

## 2019-06-01 PROCEDURE — 81001 URINALYSIS AUTO W/SCOPE: CPT

## 2019-06-01 PROCEDURE — 83690 ASSAY OF LIPASE: CPT

## 2019-06-01 PROCEDURE — 87086 URINE CULTURE/COLONY COUNT: CPT

## 2019-06-01 PROCEDURE — 74176 CT ABD & PELVIS W/O CONTRAST: CPT

## 2019-06-01 PROCEDURE — 85027 COMPLETE CBC AUTOMATED: CPT

## 2019-06-01 PROCEDURE — C1769: CPT

## 2019-06-01 PROCEDURE — 96365 THER/PROPH/DIAG IV INF INIT: CPT

## 2019-06-01 PROCEDURE — 87186 SC STD MICRODIL/AGAR DIL: CPT

## 2019-06-01 PROCEDURE — C2617: CPT

## 2019-06-01 PROCEDURE — 74018 RADEX ABDOMEN 1 VIEW: CPT

## 2019-06-01 PROCEDURE — 36415 COLL VENOUS BLD VENIPUNCTURE: CPT

## 2019-06-01 PROCEDURE — 80053 COMPREHEN METABOLIC PANEL: CPT

## 2019-06-01 RX ORDER — SCOPALAMINE 1 MG/3D
1 PATCH, EXTENDED RELEASE TRANSDERMAL
Qty: 2 | Refills: 0
Start: 2019-06-01 | End: 2019-06-06

## 2019-06-01 RX ORDER — PHENAZOPYRIDINE HCL 100 MG
1 TABLET ORAL
Qty: 9 | Refills: 0
Start: 2019-06-01 | End: 2019-06-03

## 2019-06-01 RX ORDER — ZALEPLON 10 MG
1 CAPSULE ORAL
Qty: 5 | Refills: 0
Start: 2019-06-01 | End: 2019-06-05

## 2019-06-01 RX ORDER — AMOXICILLIN 250 MG/5ML
1 SUSPENSION, RECONSTITUTED, ORAL (ML) ORAL
Qty: 11 | Refills: 0
Start: 2019-06-01 | End: 2019-06-04

## 2019-06-01 RX ORDER — ONDANSETRON 8 MG/1
1 TABLET, FILM COATED ORAL
Qty: 0 | Refills: 0 | DISCHARGE

## 2019-06-01 RX ORDER — FAMOTIDINE 10 MG/ML
1 INJECTION INTRAVENOUS
Qty: 20 | Refills: 0
Start: 2019-06-01 | End: 2019-06-10

## 2019-06-01 RX ORDER — KETOROLAC TROMETHAMINE 30 MG/ML
1 SYRINGE (ML) INJECTION
Qty: 9 | Refills: 0
Start: 2019-06-01 | End: 2019-06-03

## 2019-06-01 RX ORDER — BELLADONNA ALKALOIDS 30MG/100ML
0 LIQUID (ML) MISCELLANEOUS
Qty: 0 | Refills: 0 | DISCHARGE

## 2019-06-01 RX ORDER — ONDANSETRON 8 MG/1
1 TABLET, FILM COATED ORAL
Qty: 30 | Refills: 0
Start: 2019-06-01 | End: 2019-06-10

## 2019-06-01 RX ORDER — LACTOBACILLUS ACIDOPHILUS 100MM CELL
2 CAPSULE ORAL
Qty: 0 | Refills: 0 | DISCHARGE
Start: 2019-06-01

## 2019-06-01 RX ORDER — CLONAZEPAM 1 MG
0 TABLET ORAL
Qty: 0 | Refills: 0 | DISCHARGE

## 2019-06-01 RX ORDER — ESCITALOPRAM OXALATE 10 MG/1
1 TABLET, FILM COATED ORAL
Qty: 0 | Refills: 0 | DISCHARGE

## 2019-06-01 RX ORDER — PROCHLORPERAZINE MALEATE 5 MG
1 TABLET ORAL
Qty: 10 | Refills: 0
Start: 2019-06-01 | End: 2019-06-05

## 2019-06-01 RX ADMIN — Medication 200 MILLIGRAM(S): at 05:44

## 2019-06-01 RX ADMIN — ONDANSETRON 4 MILLIGRAM(S): 8 TABLET, FILM COATED ORAL at 05:44

## 2019-06-01 RX ADMIN — ONDANSETRON 4 MILLIGRAM(S): 8 TABLET, FILM COATED ORAL at 11:21

## 2019-06-01 RX ADMIN — Medication 500 MILLIGRAM(S): at 05:44

## 2019-06-01 RX ADMIN — Medication 1 GRAM(S): at 05:44

## 2019-06-01 RX ADMIN — FAMOTIDINE 20 MILLIGRAM(S): 10 INJECTION INTRAVENOUS at 05:44

## 2019-06-01 RX ADMIN — ESCITALOPRAM OXALATE 20 MILLIGRAM(S): 10 TABLET, FILM COATED ORAL at 11:45

## 2019-06-01 RX ADMIN — Medication 1 TABLET(S): at 11:45

## 2019-06-01 RX ADMIN — Medication 1 MILLIGRAM(S): at 09:50

## 2019-06-01 RX ADMIN — Medication 1 TABLET(S): at 09:50

## 2019-06-01 NOTE — DISCHARGE NOTE NURSING/CASE MANAGEMENT/SOCIAL WORK - NSDCDPATPORTLINK_GEN_ALL_CORE
You can access the [facility] Patient Portal, offered by [facility], by registering with the following website: [email or web address]

## 2019-06-01 NOTE — PROGRESS NOTE ADULT - PROBLEM SELECTOR PROBLEM 2
Enterococcus faecalis infection
Enterococcus faecalis infection
IBS (irritable bowel syndrome)
Pre-op exam
Enterococcus faecalis infection
Enterococcus faecalis infection

## 2019-06-01 NOTE — PROGRESS NOTE ADULT - PROBLEM SELECTOR PLAN 1
Resolved  diet as tolerated
s/p stent placement 5/29
s/p stent, pain may be 2/2 stent, but can not be 2/2 stone with stent indwelling. Will check stent position today with kub.
- renal function improving  - reviewed abd xr, ?possible stone in L lower pelvis but difficult to visualize due to heavy stool burden  - on ceftriaxone  -  following  - supportive care
- renal function stable  - enterococcus in urine, complicated UTI by stones and now w/ stent placement, amoxil 500mg tid x5d   -  following  - supportive care
- renal function stable  - reviewed abd xr, ?possible stone in L lower pelvis but difficult to visualize due to heavy stool burden  - enterococcus in urine, complicated UTI by stones and now w/ stent placement, will give 1 dose of unasyn tonight and switch to amoxicillin to complete 5d of 875mg bid, consider extending to 7d if still feeling significant symptoms  -  following  - supportive care

## 2019-06-01 NOTE — PROGRESS NOTE ADULT - PROBLEM SELECTOR PROBLEM 1
Nausea
Ureteral stone
Ureteral stone
Hydroureteronephrosis

## 2019-06-01 NOTE — DISCHARGE NOTE PROVIDER - NSDCCPCAREPLAN_GEN_ALL_CORE_FT
PRINCIPAL DISCHARGE DIAGNOSIS  Diagnosis: Renal stone  Assessment and Plan of Treatment: s/p stenting. Please follow up with urology for further managment within 1 week of discharge. Please continue antibiotics as prescribed.

## 2019-06-01 NOTE — DISCHARGE NOTE PROVIDER - HOSPITAL COURSE
FROM ADMISSION H+P:     HPI:    34 yo F with PMH of IBS, GERD, renal stones comes in with complaints of flank pain, nausea. Patient states this started yesterday pain 10/10. Patient came to ER had CT scan showing 4mm renal stone. Urology Dr. Mahajan consulted.     As per patient shes going to [facility] in a month for further work up of her IBS. (28 May 2019 14:24)            ---    HOSPITAL COURSE:     Pt admitted w/ hydroureteronephrosis 2/2 4mm obstructing calculus.  placed stent. Renal function stable. UCx w/ 50-99k colonies of enterococcus. Transitioned from rocephin to amoxicillin 500mg tid x5d. Will f/u outpatient w/  to assess for clearance of UTI. OR UCx here was no growth after receiving rocephin in the ED. Pt had post-op course complicated by exacerbation of chronic IBS symptoms. Pt experienced intractable nausea and vomiting and required IVF and IV meds to maintain hydration. Renal function is stable. No leukocytosis. Pt suffers from severe anxiety and experienced anxiety attacks w/ tachycardia but this resolved w/ klonopin prn (home med). Will manage supportively as outpatient w/ compazine suppositories, po hydration and ODT zofran along w/ PPI and pepcid both bid for the next 7d. Carafate, pepto bismol and continuing other home meds. No further inpatient observation is recommended at this time.         ---    CONSULTANTS:     GI ([place])     ([facility])        ---    TIME SPENT:    The total amount of time spent reviewing the hospital notes, laboratory values, imaging findings, assessing/counseling the patient, discussing with consultant physicians, social work, nursing staff took 48 minutes        ---    T(C): 36.9 (06-01-19 @ 07:59), Max: 37.1 (05-31-19 @ 16:11)    HR: 92 (06-01-19 @ 07:59) (86 - 112)    BP: 112/74 (06-01-19 @ 07:59) (109/75 - 126/85)    RR: 18 (06-01-19 @ 07:59) (17 - 18)    SpO2: 95% (06-01-19 @ 07:59) (95% - 96%)        PHYSICAL EXAM:    GENERAL: patient appears uncomfortable, dry heaving during our interaction, crying, no acute distress    EYES: sclera clear, no exudates    ENMT: oropharynx clear without erythema, moist mucous membranes    NECK: supple, soft, no thyromegaly noted    LUNGS: good air entry bilaterally, clear to auscultation, symmetric breath sounds, no wheezing or rhonchi appreciated    HEART: soft S1/S2, regular rate and rhythm, no murmurs noted, no noted edema to b/l LE    GASTROINTESTINAL: abdomen is soft, nontender, no distension, normoactive bowel sounds, no palpable masses    INTEGUMENT: good skin turgor, appropriate for ethnicity, appears well perfused, no jaundice noted    MUSCULOSKELETAL: no clubbing or cyanosis, no obvious deformity    NEUROLOGIC: awake, alert, oriented x3, good muscle tone in 4 extremities, no obvious sensory deficits

## 2019-06-01 NOTE — DISCHARGE NOTE PROVIDER - CARE PROVIDER_API CALL
James Dorsey)  Urology  [address] [place] [address] [id number]  Phone: [phone]  Fax: [phone]  Follow Up Time:

## 2019-06-01 NOTE — PROGRESS NOTE ADULT - SUBJECTIVE AND OBJECTIVE BOX
INTERVAL HPI/OVERNIGHT EVENTS:  No new c/o    MEDICATIONS  (STANDING):  amoxicillin 500 milliGRAM(s) Oral three times a day  escitalopram 20 milliGRAM(s) Oral daily  famotidine Injectable 20 milliGRAM(s) IV Push every 12 hours  lactobacillus acidophilus 1 Tablet(s) Oral three times a day with meals  melatonin 5 milliGRAM(s) Oral at bedtime  phenazopyridine 200 milliGRAM(s) Oral three times a day  sucralfate suspension 1 Gram(s) Oral every 12 hours    MEDICATIONS  (PRN):  aluminum hydroxide/magnesium hydroxide/simethicone Suspension 30 milliLiter(s) Oral every 4 hours PRN Dyspepsia  clonazePAM  Tablet 1 milliGRAM(s) Oral daily PRN anxiety  Donnatal 1 Tablet(s) Oral every 8 hours PRN IBS symptoms  HYDROmorphone  Injectable 1 milliGRAM(s) IV Push every 2 hours PRN Severe Pain (7 - 10)  ketorolac   Injectable 15 milliGRAM(s) IV Push every 6 hours PRN Moderate Pain (4 - 6)  ketorolac   Injectable 30 milliGRAM(s) IV Push every 6 hours PRN Severe Pain (7 - 10)  ondansetron Injectable 4 milliGRAM(s) IV Push every 6 hours PRN Nausea and/or Vomiting  oxyCODONE    IR 5 milliGRAM(s) Oral every 4 hours PRN Moderate Pain (4 - 6)  prochlorperazine   IVPB 10 milliGRAM(s) IV Intermittent once PRN nausea and/or vomiting  zaleplon 5 milliGRAM(s) Oral at bedtime PRN Insomnia        Vital Signs Last 24 Hrs  T(C): 36.8 (01 Jun 2019 00:38), Max: 37.1 (31 May 2019 16:11)  T(F): 98.3 (01 Jun 2019 00:38), Max: 98.8 (31 May 2019 20:12)  HR: 95 (01 Jun 2019 00:38) (88 - 112)  BP: 125/83 (01 Jun 2019 00:38) (114/78 - 126/85)  BP(mean): --  RR: 17 (01 Jun 2019 00:38) (17 - 18)  SpO2: 96% (01 Jun 2019 00:38) (92% - 96%)    PHYSICAL EXAM:    No CVAT  ABDOMEN: benign    LABS:                        10.8   6.55  )-----------( 244      ( 31 May 2019 05:50 )             33.6     05-31    139  |  106  |  10  ----------------------------<  61<L>  3.5   |  24  |  0.50    Ca    8.2<L>      31 May 2019 05:50          Urine culture:  05-29 @ 21:06 --   No growth at 48 hours  Urine culture:  05-28 @ 10:52 --   50,000 - 99,000 CFU/mL Enterococcus faecalis    Blood Cultures:  05-29 @ 21:06   No growth at 48 hours  --  --  05-28 @ 10:52   50,000 - 99,000 CFU/mL Enterococcus faecalis  --  Enterococcus faecalis    RADIOLOGY & ADDITIONAL TESTS:

## 2019-06-01 NOTE — DISCHARGE NOTE PROVIDER - NSDCFUADDINST_GEN_ALL_CORE_FT
Please call to schedule your follow up appointments with your doctors (primary care doctor, urologist, gastroenterologist)  Please take your medications as detailed in your medication reconciliation  Please return to the ED for worsening of your medical condition

## 2019-06-01 NOTE — PROGRESS NOTE ADULT - ASSESSMENT
s/p stent for septic stone. Bld/Ucx - enterococcus - on amox      Cont amox; repeat Ucx post amox  Definiitive outpt tx of ureteral calculus post resolution of UTI

## 2019-06-01 NOTE — PROGRESS NOTE ADULT - REASON FOR ADMISSION
flank pain, renal stone

## 2019-06-05 ENCOUNTER — TRANSCRIPTION ENCOUNTER (OUTPATIENT)
Age: 34
End: 2019-06-05

## 2019-06-06 ENCOUNTER — RESULT REVIEW (OUTPATIENT)
Age: 34
End: 2019-06-06

## 2019-06-06 ENCOUNTER — OUTPATIENT (OUTPATIENT)
Dept: OUTPATIENT SERVICES | Facility: HOSPITAL | Age: 34
LOS: 1 days | End: 2019-06-06
Payer: COMMERCIAL

## 2019-06-06 VITALS
SYSTOLIC BLOOD PRESSURE: 116 MMHG | OXYGEN SATURATION: 96 % | DIASTOLIC BLOOD PRESSURE: 74 MMHG | HEART RATE: 84 BPM | RESPIRATION RATE: 12 BRPM | TEMPERATURE: 98 F

## 2019-06-06 VITALS
SYSTOLIC BLOOD PRESSURE: 110 MMHG | HEART RATE: 92 BPM | OXYGEN SATURATION: 94 % | HEIGHT: 63 IN | DIASTOLIC BLOOD PRESSURE: 81 MMHG | RESPIRATION RATE: 12 BRPM | TEMPERATURE: 98 F | WEIGHT: 153 LBS

## 2019-06-06 DIAGNOSIS — Z90.49 ACQUIRED ABSENCE OF OTHER SPECIFIED PARTS OF DIGESTIVE TRACT: Chronic | ICD-10-CM

## 2019-06-06 DIAGNOSIS — N20.1 CALCULUS OF URETER: ICD-10-CM

## 2019-06-06 DIAGNOSIS — Z90.13 ACQUIRED ABSENCE OF BILATERAL BREASTS AND NIPPLES: Chronic | ICD-10-CM

## 2019-06-06 LAB
HCG UR QL: NEGATIVE — SIGNIFICANT CHANGE UP
SURGICAL PATHOLOGY STUDY: SIGNIFICANT CHANGE UP

## 2019-06-06 PROCEDURE — 88300 SURGICAL PATH GROSS: CPT

## 2019-06-06 PROCEDURE — C1769: CPT

## 2019-06-06 PROCEDURE — 87086 URINE CULTURE/COLONY COUNT: CPT

## 2019-06-06 PROCEDURE — C1889: CPT

## 2019-06-06 PROCEDURE — C1758: CPT

## 2019-06-06 PROCEDURE — 81025 URINE PREGNANCY TEST: CPT

## 2019-06-06 PROCEDURE — 88300 SURGICAL PATH GROSS: CPT | Mod: 26

## 2019-06-06 PROCEDURE — 76000 FLUOROSCOPY <1 HR PHYS/QHP: CPT

## 2019-06-06 PROCEDURE — 52310 CYSTOSCOPY AND TREATMENT: CPT | Mod: LT

## 2019-06-06 RX ORDER — OXYCODONE HYDROCHLORIDE 5 MG/1
10 TABLET ORAL ONCE
Refills: 0 | Status: DISCONTINUED | OUTPATIENT
Start: 2019-06-06 | End: 2019-06-06

## 2019-06-06 RX ORDER — CEPHALEXIN 500 MG
1 CAPSULE ORAL
Qty: 20 | Refills: 0
Start: 2019-06-06 | End: 2019-06-10

## 2019-06-06 RX ORDER — SODIUM CHLORIDE 9 MG/ML
1000 INJECTION, SOLUTION INTRAVENOUS
Refills: 0 | Status: DISCONTINUED | OUTPATIENT
Start: 2019-06-06 | End: 2019-06-06

## 2019-06-06 RX ORDER — OXYCODONE HYDROCHLORIDE 5 MG/1
5 TABLET ORAL ONCE
Refills: 0 | Status: DISCONTINUED | OUTPATIENT
Start: 2019-06-06 | End: 2019-06-06

## 2019-06-06 RX ORDER — CEFTRIAXONE 500 MG/1
1 INJECTION, POWDER, FOR SOLUTION INTRAMUSCULAR; INTRAVENOUS EVERY 24 HOURS
Refills: 0 | Status: CANCELLED | OUTPATIENT
Start: 2019-06-07 | End: 2019-06-06

## 2019-06-06 RX ORDER — OMEPRAZOLE 10 MG/1
1 CAPSULE, DELAYED RELEASE ORAL
Qty: 0 | Refills: 0 | DISCHARGE

## 2019-06-06 RX ORDER — CEFTRIAXONE 500 MG/1
1 INJECTION, POWDER, FOR SOLUTION INTRAMUSCULAR; INTRAVENOUS ONCE
Refills: 0 | Status: DISCONTINUED | OUTPATIENT
Start: 2019-06-06 | End: 2019-06-06

## 2019-06-06 RX ORDER — HYDROMORPHONE HYDROCHLORIDE 2 MG/ML
0.5 INJECTION INTRAMUSCULAR; INTRAVENOUS; SUBCUTANEOUS
Refills: 0 | Status: DISCONTINUED | OUTPATIENT
Start: 2019-06-06 | End: 2019-06-06

## 2019-06-06 RX ORDER — ONDANSETRON 8 MG/1
0 TABLET, FILM COATED ORAL
Qty: 0 | Refills: 0 | DISCHARGE

## 2019-06-06 RX ORDER — CEFTRIAXONE 500 MG/1
INJECTION, POWDER, FOR SOLUTION INTRAMUSCULAR; INTRAVENOUS
Refills: 0 | Status: DISCONTINUED | OUTPATIENT
Start: 2019-06-06 | End: 2019-06-06

## 2019-06-06 RX ORDER — ONDANSETRON 8 MG/1
4 TABLET, FILM COATED ORAL ONCE
Refills: 0 | Status: COMPLETED | OUTPATIENT
Start: 2019-06-06 | End: 2019-06-06

## 2019-06-06 RX ADMIN — SODIUM CHLORIDE 75 MILLILITER(S): 9 INJECTION, SOLUTION INTRAVENOUS at 08:50

## 2019-06-06 RX ADMIN — ONDANSETRON 4 MILLIGRAM(S): 8 TABLET, FILM COATED ORAL at 09:21

## 2019-06-06 NOTE — BRIEF OPERATIVE NOTE - NSICDXBRIEFPROCEDURE_GEN_ALL_CORE_FT
PROCEDURES:  Cystoscopy with removal of stent and retrograde pyelography 06-Jun-2019 08:17:24  James Dorsey  Cystoscopy, with retrograde pyelogram and calculus manipulation 06-Jun-2019 08:17:02  James Dorsey

## 2019-06-06 NOTE — ASU PATIENT PROFILE, ADULT - NS TRANSFER PATIENT BELONGINGS
Patient Information     Patient Name MRN Sex DOB Hultman, Corinne G [id number] Female 1952      Nursing Note by Brigitte Canales at 3/21/2017 12:45 PM     Author:  Brigitte Canales Service:  (none) Author Type:  (none)     Filed:  3/21/2017  1:46 PM Encounter Date:  3/21/2017 Status:  Signed     :  Brigitte Canales            Universal Protocol    A. Pre-procedure verification complete yes  1-relevant information / documentation available, reviewed and properly matched to the patient; 2-consent accurate and complete, 3-equipment and supplies available    B. Site marking complete N/A  Site marked if not in continuous attendance with patient    C. TIME OUT completed yes  Time Out was conducted just prior to starting procedure to verify the eight required elements: 1-patient identity, 2-consent accurate and complete, 3-position, 4-correct side/site marked (if applicable), 5-procedure, 6-relevant images / results properly labeled and displayed (if applicable), 7-antibiotics / irrigation fluids (if applicable), 8-safety precautions.             Clothing

## 2019-06-06 NOTE — H&P ADULT - HISTORY OF PRESENT ILLNESS
33y year old Female with PMHx of IBS, GERD, renal stones, anxiety, s/p lap michelle presenting for left stent removal w/possible stent extraction. Pt recently admitted to [facility] on 5/28 with L sided 4mm stone and hydronephrosis, also diagnosed with UTI given IV abx, then oral upon d/c, course completed. Patient to f/u with [facility] in 1 month for IBS.  Patient currently complains of persistent L sided flank pain radiating to the groin since discharge. Additionally admits to urinary complaints including burning, increased frequency, complaint of persistent nausea. Patient attempted to remove stent in office but was unsuccessful 2/2 pain.

## 2019-06-06 NOTE — H&P ADULT - ASSESSMENT
33y year old Female with PMHx of IBS, GERD, renal stones, anxiety, s/p lap michelle presenting for left stent removal w/possible stent extraction.    Plan:    left stent removal w/possible stent extraction.

## 2019-06-06 NOTE — H&P ADULT - NSHPREVIEWOFSYSTEMS_GEN_ALL_CORE
Constitutional: No fever, fatigue or weight loss.  Skin: No rash.  Eyes: No recent vision problems or eye pain.  ENT: No congestion, ear pain, or sore throat.  Endocrine: No thyroid problems.  Cardiovascular: No chest pain or palpation.  Respiratory: No cough, shortness of breath, congestion, or wheezing.  Gastrointestinal: +nausea. No abdominal pain, vomiting, or diarrhea.  Genitourinary: + dysuria, polyuria.  Musculoskeletal: No joint swelling.  Neurologic: No headache.

## 2019-06-06 NOTE — H&P ADULT - NSHPPHYSICALEXAM_GEN_ALL_CORE
PHYSICAL EXAM:  GENERAL: No acute distress, well-developed  HEAD:  Atraumatic, Normocephalic  CHEST/LUNG: CTAB; No wheezes, rales, or rhonchi  HEART: Regular rate and rhythm; No murmurs, rubs, or gallops  ABDOMEN: Soft, non-tender, non-distended; normal bowel sounds  NEUROLOGY: A&O x 3, no focal deficits

## 2019-06-08 LAB
CULTURE RESULTS: SIGNIFICANT CHANGE UP
SPECIMEN SOURCE: SIGNIFICANT CHANGE UP

## 2019-06-18 ENCOUNTER — OUTPATIENT (OUTPATIENT)
Dept: OUTPATIENT SERVICES | Facility: HOSPITAL | Age: 34
LOS: 1 days | End: 2019-06-18
Payer: COMMERCIAL

## 2019-06-18 ENCOUNTER — APPOINTMENT (OUTPATIENT)
Dept: CT IMAGING | Facility: CLINIC | Age: 34
End: 2019-06-18
Payer: COMMERCIAL

## 2019-06-18 ENCOUNTER — TRANSCRIPTION ENCOUNTER (OUTPATIENT)
Age: 34
End: 2019-06-18

## 2019-06-18 ENCOUNTER — INPATIENT (INPATIENT)
Facility: HOSPITAL | Age: 34
LOS: 2 days | Discharge: HOME | DRG: 669 | End: 2019-06-21
Attending: INTERNAL MEDICINE | Admitting: STUDENT IN AN ORGANIZED HEALTH CARE EDUCATION/TRAINING PROGRAM
Payer: COMMERCIAL

## 2019-06-18 VITALS
SYSTOLIC BLOOD PRESSURE: 133 MMHG | DIASTOLIC BLOOD PRESSURE: 94 MMHG | HEART RATE: 98 BPM | RESPIRATION RATE: 14 BRPM | WEIGHT: 162.92 LBS | TEMPERATURE: 98 F | OXYGEN SATURATION: 100 %

## 2019-06-18 DIAGNOSIS — K58.9 IRRITABLE BOWEL SYNDROME WITHOUT DIARRHEA: ICD-10-CM

## 2019-06-18 DIAGNOSIS — Z29.9 ENCOUNTER FOR PROPHYLACTIC MEASURES, UNSPECIFIED: ICD-10-CM

## 2019-06-18 DIAGNOSIS — Z90.13 ACQUIRED ABSENCE OF BILATERAL BREASTS AND NIPPLES: Chronic | ICD-10-CM

## 2019-06-18 DIAGNOSIS — F41.9 ANXIETY DISORDER, UNSPECIFIED: ICD-10-CM

## 2019-06-18 DIAGNOSIS — Z90.49 ACQUIRED ABSENCE OF OTHER SPECIFIED PARTS OF DIGESTIVE TRACT: Chronic | ICD-10-CM

## 2019-06-18 DIAGNOSIS — N23 UNSPECIFIED RENAL COLIC: ICD-10-CM

## 2019-06-18 DIAGNOSIS — N17.9 ACUTE KIDNEY FAILURE, UNSPECIFIED: ICD-10-CM

## 2019-06-18 LAB
ANION GAP SERPL CALC-SCNC: 8 MMOL/L — SIGNIFICANT CHANGE UP (ref 5–17)
APPEARANCE UR: CLEAR — SIGNIFICANT CHANGE UP
BILIRUB UR-MCNC: NEGATIVE — SIGNIFICANT CHANGE UP
BUN SERPL-MCNC: 11 MG/DL — SIGNIFICANT CHANGE UP (ref 7–23)
CALCIUM SERPL-MCNC: 8.6 MG/DL — SIGNIFICANT CHANGE UP (ref 8.5–10.1)
CHLORIDE SERPL-SCNC: 104 MMOL/L — SIGNIFICANT CHANGE UP (ref 96–108)
CO2 SERPL-SCNC: 27 MMOL/L — SIGNIFICANT CHANGE UP (ref 22–31)
COLOR SPEC: SIGNIFICANT CHANGE UP
CREAT SERPL-MCNC: 1.2 MG/DL — SIGNIFICANT CHANGE UP (ref 0.5–1.3)
DIFF PNL FLD: NEGATIVE — SIGNIFICANT CHANGE UP
GLUCOSE SERPL-MCNC: 85 MG/DL — SIGNIFICANT CHANGE UP (ref 70–99)
GLUCOSE UR QL: NEGATIVE — SIGNIFICANT CHANGE UP
HCG SERPL-ACNC: <1 MIU/ML — SIGNIFICANT CHANGE UP
HCT VFR BLD CALC: 37.2 % — SIGNIFICANT CHANGE UP (ref 34.5–45)
HGB BLD-MCNC: 11.7 G/DL — SIGNIFICANT CHANGE UP (ref 11.5–15.5)
KETONES UR-MCNC: NEGATIVE — SIGNIFICANT CHANGE UP
LEUKOCYTE ESTERASE UR-ACNC: NEGATIVE — SIGNIFICANT CHANGE UP
MCHC RBC-ENTMCNC: 28.5 PG — SIGNIFICANT CHANGE UP (ref 27–34)
MCHC RBC-ENTMCNC: 31.5 GM/DL — LOW (ref 32–36)
MCV RBC AUTO: 90.5 FL — SIGNIFICANT CHANGE UP (ref 80–100)
NITRITE UR-MCNC: NEGATIVE — SIGNIFICANT CHANGE UP
NRBC # BLD: 0 /100 WBCS — SIGNIFICANT CHANGE UP (ref 0–0)
PH UR: 8 — SIGNIFICANT CHANGE UP (ref 5–8)
PLATELET # BLD AUTO: 428 K/UL — HIGH (ref 150–400)
POTASSIUM SERPL-MCNC: 4.4 MMOL/L — SIGNIFICANT CHANGE UP (ref 3.5–5.3)
POTASSIUM SERPL-SCNC: 4.4 MMOL/L — SIGNIFICANT CHANGE UP (ref 3.5–5.3)
PROT UR-MCNC: NEGATIVE — SIGNIFICANT CHANGE UP
RBC # BLD: 4.11 M/UL — SIGNIFICANT CHANGE UP (ref 3.8–5.2)
RBC # FLD: 12.7 % — SIGNIFICANT CHANGE UP (ref 10.3–14.5)
SODIUM SERPL-SCNC: 139 MMOL/L — SIGNIFICANT CHANGE UP (ref 135–145)
SP GR SPEC: 1.01 — SIGNIFICANT CHANGE UP (ref 1.01–1.02)
UROBILINOGEN FLD QL: NEGATIVE — SIGNIFICANT CHANGE UP
WBC # BLD: 9.27 K/UL — SIGNIFICANT CHANGE UP (ref 3.8–10.5)
WBC # FLD AUTO: 9.27 K/UL — SIGNIFICANT CHANGE UP (ref 3.8–10.5)

## 2019-06-18 PROCEDURE — 99285 EMERGENCY DEPT VISIT HI MDM: CPT

## 2019-06-18 PROCEDURE — 93010 ELECTROCARDIOGRAM REPORT: CPT

## 2019-06-18 PROCEDURE — 74176 CT ABD & PELVIS W/O CONTRAST: CPT | Mod: 26

## 2019-06-18 PROCEDURE — 99223 1ST HOSP IP/OBS HIGH 75: CPT | Mod: GC,AI

## 2019-06-18 PROCEDURE — 74176 CT ABD & PELVIS W/O CONTRAST: CPT

## 2019-06-18 RX ORDER — ONDANSETRON 8 MG/1
4 TABLET, FILM COATED ORAL EVERY 6 HOURS
Refills: 0 | Status: DISCONTINUED | OUTPATIENT
Start: 2019-06-18 | End: 2019-06-19

## 2019-06-18 RX ORDER — ESCITALOPRAM OXALATE 10 MG/1
20 TABLET, FILM COATED ORAL DAILY
Refills: 0 | Status: DISCONTINUED | OUTPATIENT
Start: 2019-06-18 | End: 2019-06-19

## 2019-06-18 RX ORDER — DIPHENHYDRAMINE HCL 50 MG
25 CAPSULE ORAL AT BEDTIME
Refills: 0 | Status: DISCONTINUED | OUTPATIENT
Start: 2019-06-18 | End: 2019-06-19

## 2019-06-18 RX ORDER — MORPHINE SULFATE 50 MG/1
4 CAPSULE, EXTENDED RELEASE ORAL ONCE
Refills: 0 | Status: DISCONTINUED | OUTPATIENT
Start: 2019-06-18 | End: 2019-06-18

## 2019-06-18 RX ORDER — PANTOPRAZOLE SODIUM 20 MG/1
40 TABLET, DELAYED RELEASE ORAL
Refills: 0 | Status: DISCONTINUED | OUTPATIENT
Start: 2019-06-18 | End: 2019-06-19

## 2019-06-18 RX ORDER — ACETAMINOPHEN 500 MG
650 TABLET ORAL EVERY 6 HOURS
Refills: 0 | Status: DISCONTINUED | OUTPATIENT
Start: 2019-06-18 | End: 2019-06-19

## 2019-06-18 RX ORDER — COLESEVELAM HYDROCHLORIDE 625 MG/1
0 TABLET, FILM COATED ORAL
Qty: 0 | Refills: 0 | DISCHARGE

## 2019-06-18 RX ORDER — SODIUM CHLORIDE 9 MG/ML
1000 INJECTION, SOLUTION INTRAVENOUS
Refills: 0 | Status: DISCONTINUED | OUTPATIENT
Start: 2019-06-18 | End: 2019-06-20

## 2019-06-18 RX ORDER — ONDANSETRON 8 MG/1
4 TABLET, FILM COATED ORAL ONCE
Refills: 0 | Status: COMPLETED | OUTPATIENT
Start: 2019-06-18 | End: 2019-06-18

## 2019-06-18 RX ORDER — CLONAZEPAM 1 MG
0 TABLET ORAL
Qty: 0 | Refills: 0 | DISCHARGE

## 2019-06-18 RX ORDER — LIDOCAINE HCL 20 MG/ML
110 VIAL (ML) INJECTION ONCE
Refills: 0 | Status: COMPLETED | OUTPATIENT
Start: 2019-06-18 | End: 2019-06-18

## 2019-06-18 RX ORDER — KETOROLAC TROMETHAMINE 30 MG/ML
30 SYRINGE (ML) INJECTION ONCE
Refills: 0 | Status: DISCONTINUED | OUTPATIENT
Start: 2019-06-18 | End: 2019-06-18

## 2019-06-18 RX ORDER — KETOROLAC TROMETHAMINE 30 MG/ML
30 SYRINGE (ML) INJECTION EVERY 6 HOURS
Refills: 0 | Status: DISCONTINUED | OUTPATIENT
Start: 2019-06-18 | End: 2019-06-19

## 2019-06-18 RX ORDER — SODIUM CHLORIDE 9 MG/ML
1000 INJECTION INTRAMUSCULAR; INTRAVENOUS; SUBCUTANEOUS ONCE
Refills: 0 | Status: COMPLETED | OUTPATIENT
Start: 2019-06-18 | End: 2019-06-18

## 2019-06-18 RX ORDER — PHENAZOPYRIDINE HCL 100 MG
100 TABLET ORAL EVERY 8 HOURS
Refills: 0 | Status: DISCONTINUED | OUTPATIENT
Start: 2019-06-18 | End: 2019-06-19

## 2019-06-18 RX ORDER — TAMSULOSIN HYDROCHLORIDE 0.4 MG/1
0.4 CAPSULE ORAL ONCE
Refills: 0 | Status: COMPLETED | OUTPATIENT
Start: 2019-06-18 | End: 2019-06-18

## 2019-06-18 RX ADMIN — SODIUM CHLORIDE 125 MILLILITER(S): 9 INJECTION, SOLUTION INTRAVENOUS at 21:13

## 2019-06-18 RX ADMIN — Medication 25 MILLIGRAM(S): at 22:42

## 2019-06-18 RX ADMIN — Medication 30 MILLIGRAM(S): at 14:00

## 2019-06-18 RX ADMIN — SODIUM CHLORIDE 1000 MILLILITER(S): 9 INJECTION INTRAMUSCULAR; INTRAVENOUS; SUBCUTANEOUS at 16:00

## 2019-06-18 RX ADMIN — ONDANSETRON 4 MILLIGRAM(S): 8 TABLET, FILM COATED ORAL at 16:20

## 2019-06-18 RX ADMIN — SODIUM CHLORIDE 1000 MILLILITER(S): 9 INJECTION INTRAMUSCULAR; INTRAVENOUS; SUBCUTANEOUS at 13:30

## 2019-06-18 RX ADMIN — ONDANSETRON 4 MILLIGRAM(S): 8 TABLET, FILM COATED ORAL at 13:30

## 2019-06-18 RX ADMIN — Medication 30 MILLIGRAM(S): at 22:55

## 2019-06-18 RX ADMIN — Medication 30 MILLIGRAM(S): at 22:42

## 2019-06-18 RX ADMIN — ESCITALOPRAM OXALATE 20 MILLIGRAM(S): 10 TABLET, FILM COATED ORAL at 22:42

## 2019-06-18 RX ADMIN — Medication 1 MILLIGRAM(S): at 17:30

## 2019-06-18 RX ADMIN — Medication 30 MILLIGRAM(S): at 14:30

## 2019-06-18 RX ADMIN — Medication 100 MILLIGRAM(S): at 22:42

## 2019-06-18 RX ADMIN — Medication 211 MILLIGRAM(S): at 16:25

## 2019-06-18 RX ADMIN — Medication 1 TABLET(S): at 22:44

## 2019-06-18 RX ADMIN — Medication 0.5 MILLIGRAM(S): at 22:42

## 2019-06-18 RX ADMIN — ONDANSETRON 4 MILLIGRAM(S): 8 TABLET, FILM COATED ORAL at 22:44

## 2019-06-18 NOTE — H&P ADULT - PROBLEM SELECTOR PLAN 3
Chronic  -tachycardia may be 2/2 anxiety vs pain on presentation  -continue home Klonopin 0.5mg prn and Lexapro 20mg Chronic  -tachycardia may be 2/2 anxiety vs pain while in ED ()  -continue home Klonopin 0.5mg prn and Lexapro 20mg Constipation dominant, had more diarrhea in the past  - Protonix 40mg daily (on Omeprazole at home)  - Continue patient's home med Donnatal (Belladonna) q8h PRN for IBS symptoms  - Holding home Imipramine (patient states she was told to hold due to her kidney issues)  - Can consider starting BM regimen should patient experience continued constipation Constipation predominant, had more diarrhea in the past  - Protonix 40mg daily (on Omeprazole at home)  - Continue patient's home med Donnatal (Belladonna) q8h PRN for IBS symptoms  - Holding home Imipramine (patient states she was told to hold due to her kidney issues)  - Can consider starting BM regimen should patient experience worsening constipation

## 2019-06-18 NOTE — ED PROVIDER NOTE - CONSTITUTIONAL, MLM
normal... Uncomfortable appearing white male, well nourished, awake, alert, oriented to person, place, time/situation and in mild apparent distress.

## 2019-06-18 NOTE — H&P ADULT - NSHPPHYSICALEXAM_GEN_ALL_CORE
Vital Signs Last 24 Hrs  T(C): 36.9 (18 Jun 2019 12:41), Max: 36.9 (18 Jun 2019 12:41)  T(F): 98.4 (18 Jun 2019 12:41), Max: 98.4 (18 Jun 2019 12:41)  HR: 117 (18 Jun 2019 17:30) (92 - 117)  BP: 140/72 (18 Jun 2019 17:30) (133/94 - 151/98)  RR: 20 (18 Jun 2019 17:30) (12 - 20)  SpO2: 100% (18 Jun 2019 17:30) (100% - 100%) Vital Signs Last 24 Hrs  T(C): 36.9 (18 Jun 2019 12:41), Max: 36.9 (18 Jun 2019 12:41)  T(F): 98.4 (18 Jun 2019 12:41), Max: 98.4 (18 Jun 2019 12:41)  HR: 117 (18 Jun 2019 17:30) (92 - 117)  BP: 140/72 (18 Jun 2019 17:30) (133/94 - 151/98)  RR: 20 (18 Jun 2019 17:30) (12 - 20)  SpO2: 100% (18 Jun 2019 17:30) (100% - 100%)    T(C): 36.9 (06-18-19 @ 12:41), Max: 36.9 (06-18-19 @ 12:41)  HR: 117 (06-18-19 @ 17:30) (92 - 117)  BP: 140/72 (06-18-19 @ 17:30) (133/94 - 151/98)  RR: 20 (06-18-19 @ 17:30) (12 - 20)  SpO2: 100% (06-18-19 @ 17:30) (100% - 100%)    GENERAL: patient appears distressed, anxious, tearful, but able to answer questioning  EYES: sclera clear, no exudates  ENMT: oropharynx clear without erythema, no exudates, moist mucous membranes  NECK: supple, soft  LUNGS: good air entry bilaterally, clear to auscultation, symmetric breath sounds, no wheezing or rhonchi appreciated  HEART: soft S1/S2, regular rate and rhythm, no murmurs noted, no lower extremity edema  GASTROINTESTINAL: abdomen is soft, nondistended, +tender to palpation at RUQ, LLQ; normoactive bowel sounds  GENITOURINARY: +left CVA tenderness; no right CVA tenderness  INTEGUMENT: warm, well-perfused, good skin turgor  MUSCULOSKELETAL: no clubbing or cyanosis, no obvious deformity  NEUROLOGIC: awake, alert, oriented x3, good muscle tone in 4 extremities, no obvious sensory deficits  HEME/LYMPH: no obvious ecchymosis or petechiae T(C): 36.9 (06-18-19 @ 12:41), Max: 36.9 (06-18-19 @ 12:41)  HR: 117 (06-18-19 @ 17:30) (92 - 117)  BP: 140/72 (06-18-19 @ 17:30) (133/94 - 151/98)  RR: 20 (06-18-19 @ 17:30) (12 - 20)  SpO2: 100% (06-18-19 @ 17:30) (100% - 100%)    GENERAL: patient appears somewhat distressed, anxious, tearful, but able to answer questioning  EYES: sclera clear, no exudates  ENMT: oropharynx clear without erythema, no exudates, moist mucous membranes  LUNGS: good air entry bilaterally, clear to auscultation, symmetric breath sounds, no wheezing or rhonchi appreciated  HEART: soft S1/S2, regular rate and rhythm, no murmurs noted, no lower extremity edema  GASTROINTESTINAL: abdomen is soft, nondistended, diffusely tender to palpation at RUQ, LLQ; normoactive bowel sounds, no guarding, no rebound tenderness  GENITOURINARY: positive left CVA tenderness; no right CVA tenderness  INTEGUMENT: warm, well-perfused, good skin turgor  MUSCULOSKELETAL: no clubbing or cyanosis, no obvious deformity  NEUROLOGIC: awake, alert, oriented x3, good muscle tone in 4 extremities, no obvious sensory deficits

## 2019-06-18 NOTE — ED ADULT NURSE REASSESSMENT NOTE - NS ED NURSE REASSESS COMMENT FT1
admitting resident at bedside.  pt reports no relief in pain.  pt crying states she's "having a panic attack"  parents at bedside.

## 2019-06-18 NOTE — ED ADULT NURSE NOTE - CHPI ED NUR SYMPTOMS NEG
no dysuria/no hematuria/no fever/no diarrhea/no abdominal distension/no vomiting/no blood in stool/no burning urination/no chills

## 2019-06-18 NOTE — H&P ADULT - ATTENDING COMMENTS
I personally conducted a physical examination of the patient. I personally gathered the patient's history. I edited the above listed findings which were prepared by the listed resident physician. I personally discussed the plan of care with the patient. The questions and concerns were addressed to the best of my ability. The patient is in agreement with the listed treatment plan.     - pt has been experiencing residual symptoms since [facility] hospitalization. stent was removed, stone remains. may need laser lithotripsy vs. alternative tx option due to severity of symptoms. pt is nontoxic, normal U/A, will monitor off abx tonight, will recommended perioperative abx per cysto guidelines  - likely for cysto. npo p midnight but  has not yet evaluated the pt.  - add toradol and pyridium for symptomatic relief along w benadryl as sleep aide and IVF overnight for CB

## 2019-06-18 NOTE — PHARMACOTHERAPY INTERVENTION NOTE - COMMENTS
Provider requests lidocaine infusion for renal colic pain. Recommended 110mg IVPB given over 30 minutes. Also recommended an ECG prior to infusion.

## 2019-06-18 NOTE — H&P ADULT - PROBLEM SELECTOR PLAN 4
IMPROVE VTE Individual Risk Assessment  RISK                                                                Points  [  ] Previous VTE                                                  3  [ x ] Thrombophilia                                               2  [  ] Lower limb paralysis                                      2        (unable to hold up >15 seconds)    [  ] Current Cancer                                              2         (within 6 months)  [  ] Immobilization > 24 hrs                                1  [  ] ICU/CCU stay > 24 hours                              1  [  ] Age > 60                                                      1    IMPROVE VTE Score ___2______    DVT ppx: scd if patient for OR tomorrow, can start lovenox postop    IMPROVE Score 0-1: Low Risk, No VTE prophylaxis required for most patients, encourage ambulation.   IMPROVE Score 2-3: At risk, pharmacologic VTE prophylaxis is indicated for most patients (in the absence of a contraindication)  IMPROVE Score > or = 4: High Risk, pharmacologic VTE prophylaxis is indicated for most patients (in the absence of a contraindication) Chronic, with tachycardia likely 2/2 anxiety vs pain while in ED ()  - Continue Ativan 0.5 mg q8h PRN for anxiety (on Klonopin 0.5mg at home)  - Continue home med Lexapro 20mg

## 2019-06-18 NOTE — H&P ADULT - PROBLEM SELECTOR PLAN 5
DVT PPx with SCDs for now as patient is young and ambulatory    IMPROVE VTE Individual Risk Assessment  RISK                                                                Points  [  ] Previous VTE                                                  3  [ x ] Thrombophilia                                               2  [  ] Lower limb paralysis                                      2        (unable to hold up >15 seconds)    [  ] Current Cancer                                              2         (within 6 months)  [  ] Immobilization > 24 hrs                                1  [  ] ICU/CCU stay > 24 hours                              1  [  ] Age > 60                                                      1    IMPROVE VTE Score 2    IMPROVE Score 0-1: Low Risk, No VTE prophylaxis required for most patients, encourage ambulation.   IMPROVE Score 2-3: At risk, pharmacologic VTE prophylaxis is indicated for most patients (in the absence of a contraindication)  IMPROVE Score > or = 4: High Risk, pharmacologic VTE prophylaxis is indicated for most patients (in the absence of a contraindication) DVT PPx with SCDs for now as patient is young without significant comorbid conditions and readily ambulatory    IMPROVE VTE Individual Risk Assessment  RISK                                                                Points  [  ] Previous VTE                                                  3  [ x ] Thrombophilia                                               2  [  ] Lower limb paralysis                                      2        (unable to hold up >15 seconds)    [  ] Current Cancer                                              2         (within 6 months)  [  ] Immobilization > 24 hrs                                1  [  ] ICU/CCU stay > 24 hours                              1  [  ] Age > 60                                                      1    IMPROVE VTE Score 2    IMPROVE Score 0-1: Low Risk, No VTE prophylaxis required for most patients, encourage ambulation.   IMPROVE Score 2-3: At risk, pharmacologic VTE prophylaxis is indicated for most patients (in the absence of a contraindication)  IMPROVE Score > or = 4: High Risk, pharmacologic VTE prophylaxis is indicated for most patients (in the absence of a contraindication) DVT PPx with SCDs for now as patient is young without significant comorbid conditions and readily ambulatory    IMPROVE VTE Individual Risk Assessment  RISK                                                                Points  [  ] Previous VTE                                                  3  [  ] Thrombophilia                                               2  [  ] Lower limb paralysis                                      2        (unable to hold up >15 seconds)    [  ] Current Cancer                                              2         (within 6 months)  [  ] Immobilization > 24 hrs                                1  [  ] ICU/CCU stay > 24 hours                              1  [  ] Age > 60                                                      1  IMPROVE VTE Score 0    IMPROVE Score 0-1: Low Risk, No VTE prophylaxis required for most patients, encourage ambulation.   IMPROVE Score 2-3: At risk, pharmacologic VTE prophylaxis is indicated for most patients (in the absence of a contraindication)  IMPROVE Score > or = 4: High Risk, pharmacologic VTE prophylaxis is indicated for most patients (in the absence of a contraindication)

## 2019-06-18 NOTE — ED ADULT NURSE NOTE - OBJECTIVE STATEMENT
Pt A&Ox4, ambulatory to ED c/o left flank and abdominal pain.  Pt states she has history of kidney stone and stent but states that stent was retrieved without stone but pain resolved anyway for about 2 weeks.  This morning around 0100 pt awoke with severe pain to left flank and pressure to suprapubic area with a sense of urinary urgency.  Pt states that she feels constant urinary pressure, increasing pain and nausea.  Pt denies fever or chills.

## 2019-06-18 NOTE — H&P ADULT - ASSESSMENT
34 yo white female with H/O Anxiety, Cholelithiasis, IBS (irritable bowel syndrome) and kidney stones s/p recent cystoscopy for stent removal on 6/6 without stone extraction presenting to the ED with left sided flank pain. Admitted for renal colic 2/2 4mm ureteral stone, pending urteral stent placement tomorrow with Dr. Mahajan. 32 yo female with PMH of Anxiety, Cholelithiasis, IBS and kidney stone presents to ED with left sided flank pain, 4 mm L UVJ calculus on CT, admitted for renal colic, pending possible ureteral stent placement tomorrow with Dr. Mahajan. 32 yo female with PMH of Anxiety, Cholelithiasis, IBS and kidney stone presents to ED with left sided flank pain, 4 mm L UVJ calculus on CT, admitted for renal colic, may need ureteral stent (vs. alternative tx option) placement tomorrow with Dr. Mahajan.

## 2019-06-18 NOTE — ED PROVIDER NOTE - PROGRESS NOTE DETAILS
Case reviewed with Dr. Mahajan. States that patient had CT scan today at [facility] and it revealed 4mm left ureteral stone. Still has some pain but it is better. Does not want to go home until speaking to Dr. Mahajan. Repeat message left with him on machine. As a result of continued pain and keeping in mind patient recently received Toradol and patient does not want narcotics, patient will be given Lidocaine per protocol. feels worsening pain and nausea, feels very anxious, discussed case with Dr. Mhaajan, recommend admit, npo after midnight, Dr. Junior aware and will admit

## 2019-06-18 NOTE — H&P ADULT - HISTORY OF PRESENT ILLNESS
32 yo white female with H/O Anxiety, Cholelithiasis, IBS (irritable bowel syndrome) and kidney stone who presents to ED today c/o acute onset of urge to urinate at 0100 today followed by left flank pain, non radiating with nausea but no vomiting and no dysuria, fever or chills. Feels the same as when she had her previous episode of renal colic. Last month patient did have left ureteral stone and was stented at that time. Stone could not be removed at that time. Stent was removed approximately 2-weeks later and was well up until this episode today. As per ED chart review, case reviewed with Dr. Mahajan who stated that patient had CT scan today at [facility] and it revealed 4mm left ureteral stone.    In the ED, vitals T 98.4, HR 98, /94, RR14, [id number]% on RA. Labs significant thrombocytosis (428), CMP grossly within normal limits. UA neg. EKG: Given 1L NS bolus, toradol 30mg IV , lidocaine IVPB 110mg x1, lorzepam 1mg IV, zofran 4mg IV x2, tamsulosin .04mg. Urine culture pending. 34 yo white female with H/O Anxiety, Cholelithiasis, IBS (irritable bowel syndrome) and kidney stone who presents to ED today c/o acute onset of urge to urinate at 0100 today followed by left flank pain, non radiating with nausea but no vomiting and no dysuria, fever or chills. Feels the same as when she had her previous episode of renal colic. As per ED chart review, case reviewed with Dr. Mahajan who stated that patient had CT scan today at [facility] and it revealed 4mm left ureteral stone.    Of note, recently admitted to [facility] on 5/28 for hydroureteronephrosis 2/2 4mm obstructing calculus.  placed stent on 5/29 and patient was treated for enterococcus UTI with Rocephin and amoxicillin. Stent was removed on 6/6 by Dr. Dorsey, however stone was not extracted during cystoscopy.     In the ED, vitals T 98.4, HR 98, /94, RR14, [id number]% on RA. Labs significant thrombocytosis (428), CMP grossly within normal limits. UA neg. EKG: Given 1L NS bolus, Toradol 30mg IV , lidocaine IVPB 110mg x1, lorazepam 1mg IV, zofran 4mg IV x2, tamsulosin .04mg. Urine culture pending. 32 yo white female with H/O Anxiety, Cholelithiasis, IBS (irritable bowel syndrome) and kidney stone who presents to ED today c/o acute onset of urge to urinate at 0100 today followed by left flank pain, non radiating with nausea but no vomiting and no dysuria, fever or chills. Feels the same as when she had her previous episode of renal colic. As per ED chart review, case reviewed with Dr. Mahajan who stated that patient had CT scan today at [facility] and it revealed 4mm left ureteral stone.    Of note, recently admitted to [facility] on 5/28 for hydroureteronephrosis 2/2 4mm obstructing calculus.  placed stent on 5/29 and patient was treated for enterococcus UTI with Rocephin and amoxicillin. Stent was removed on 6/6 by Dr. Dorsey, however stone was not extracted during cystoscopy.     In the ED, vitals T 98.4, HR 98-->117, /94, RR14, [id number]% on RA. Labs significant thrombocytosis (428), CMP grossly within normal limits. UA neg. EKG: Given 1L NS bolus, Toradol 30mg IV , lidocaine IVPB 110mg x1, lorazepam 1mg IV, zofran 4mg IV x2, tamsulosin .04mg. Urine culture pending. 32 yo female with PMH of Anxiety, Cholelithiasis, IBS and kidney stone presents to ED with acute onset of urge to urinate at 1AM followed by left flank pain, radiating to her groin, feeling similar to her previous episode of renal colic. She states the pain comes in waves, rated 10/10 at its worst. Reports nausea but no vomiting. Occasional dysuria while attempting to urinate, denies hematuria. Denies fever or chills but states her head feels hot now. Reports chronic bile reflux which causes her RUQ pain and for which she has outpatient workup scheduled at [facility] next month. Per ED chart review and [facility], case reviewed with Dr. Mahajan who stated that patient had CT scan today at [facility] revealing 4mm left UVJ stone.    Of note, recently admitted to [facility] on 5/28 for hydroureteronephrosis 2/2 4mm obstructing calculus.  placed stent on 5/29 and patient was treated for enterococcus UTI with Rocephin and amoxicillin. Stent was removed on 6/6 by Dr. Dorsey, however stone was not extracted during cystoscopy.     In the ED, vitals T 98.4, HR 98-->117, /94, RR14, [id number]% on RA. Labs significant thrombocytosis (428), CMP grossly within normal limits. UA neg. EKG: NSR at 91 bpm, QT/QTc 368/452. Given 1L NS bolus, Toradol 30mg IV , lidocaine IVPB 110mg x1, lorazepam 1mg IV, zofran 4mg IV x2, tamsulosin .04mg. Urine culture pending. 32 yo F w/ PMHx of Anxiety, Cholelithiasis, IBS and nephrolithiasis presents to ED with acute onset of urge to urinate at 1AM followed by left flank pain, radiating to her groin, feeling similar to her previous episode of renal colic. She states the pain comes in waves, rated 10/10 at its worst. Reports nausea but no vomiting. Occasional dysuria while attempting to urinate, denies hematuria. Denies fever or chills but states her head feels hot now. Reports chronic bile reflux which causes her RUQ pain and for which she has outpatient workup scheduled at [facility] next month. Per ED chart review and [facility], case reviewed with Dr. Mahajan who stated that patient had CT scan today at [facility] revealing 4mm left UVJ stone.    Of note, recently admitted to [facility] on 5/28 for hydroureteronephrosis 2/2 4mm obstructing calculus.  placed stent on 5/29 and patient was treated for enterococcus UTI with Rocephin initially then transitioned to unasyn->amoxicillin. Stent was removed on 6/6 by Dr. Dorsey, however stone was not extracted during cystoscopy 2/2 unable to visualize    In the ED, vitals T 98.4, HR 98-->117, /94, RR14, [id number]% on RA. Labs significant thrombocytosis (428), CMP grossly within normal limits. UA neg. EKG: NSR at 91 bpm, QT/QTc 368/452. Given 1L NS bolus, Toradol 30mg IV , lidocaine IVPB 110mg x1, lorazepam 1mg IV, zofran 4mg IV x2, tamsulosin .04mg. Urine culture pending.

## 2019-06-18 NOTE — H&P ADULT - NSICDXPASTMEDICALHX_GEN_ALL_CORE_FT
PAST MEDICAL HISTORY:  Anxiety     Cholelithiasis     IBS (irritable bowel syndrome)     Kidney stone

## 2019-06-18 NOTE — H&P ADULT - NSHPSOCIALHISTORY_GEN_ALL_CORE
Social hx:  Lives alone Lives alone. Denies smoking, alcohol or illicit drug use. Lives alone. Denies smoking, alcohol or illicit drug use.  Performs all adl's independently under typical circumstances

## 2019-06-18 NOTE — ED PROVIDER NOTE - OBJECTIVE STATEMENT
34 yo white female with H/O Anxiety    Cholelithiasis    IBS (irritable bowel syndrome) and kidney stone who presents to ED today c/o acute onset of urge to urinate at 0100 today followed by left flank pain, non radiating with nausea but no vomiting and no dysuria, fever or chills. Feels the same as when she had her previous episode of renal colic. Last month patient did have left ureteral stone and was stented at that time. Stone could not be removed at that time. Stent was removed approximately 2-weeks later and was well up until this episode today.

## 2019-06-18 NOTE — H&P ADULT - NSHPREVIEWOFSYSTEMS_GEN_ALL_CORE
CONSTITUTIONAL: denies fever, chills; admits fatigue, weakness  HEENT: denies blurred vision, sore throat  SKIN: denies new lesions, rash  CARDIOVASCULAR: denies chest pain, chest pressure, palpitations  RESPIRATORY: denies shortness of breath, cough, sputum production  GASTROINTESTINAL: admits abd pain, nausea and constipation; denies vomiting, diarrhea  GENITOURINARY: admits L flank pain, increased urgency and dysuria; denies hematuria or discharge  NEUROLOGICAL: denies numbness, headache, focal weakness  MUSCULOSKELETAL: denies new joint pain, muscle aches  HEMATOLOGIC: denies gross bleeding, bruising  LYMPHATICS: denies enlarged lymph nodes, extremity swelling  PSYCHIATRIC: admits anxiety regarding her current health

## 2019-06-18 NOTE — H&P ADULT - PROBLEM SELECTOR PLAN 1
Patient with known 4mm left ureteral stone presenting with continued flank pain  -admit to medicine  -As per ED chart review, patient to have stent placement with Dr. Mahajan tomorrow AM  -Diet-clears for now, NPO after midnight  -no AC prior OR  -s/p 1L NS bolus, will continue IVF LR 125cc/hr  -pain control: mild Tylenol 650mg q6 prn, moderate Toradol 30mg q6 prn  -continue tamsulosin 0.04mg  -Urology following, will continue to follow recommendations Patient with known 4mm left ureteral stone presenting with continued flank pain  -admit to medicine  -As per ED chart review, patient to have stent placement with Dr. Mahajan tomorrow AM  -Diet-clears for now, NPO after midnight  -no AC prior OR  -UA neg, f/u Ucx  -s/p 1L NS bolus, will continue IVF LR 125cc/hr  -pain control: mild Tylenol 650mg q6 prn, moderate Toradol 30mg q6 prn  -continue tamsulosin 0.04mg  -Urology following, will continue to follow recommendations Known 4 mm L UVJ calculus on CT today (records in [facility])  - Admit to medicine  - Hold off on Abx for now as patient is afebrile, without leukocytosis and negative U/A  - Clear diet for now, NPO after midnight for possible stent placement with Dr. Mahajan tomorrow  - f/u Ucx  - s/p 1L NS bolus, will continue IVF LR 125cc/hr  - Pain control: mild Tylenol 650mg q6 PRN, moderate Toradol 30mg q6 PRN   - Zofran PRN for nausea/vomiting  - Urology following, will continue to follow recommendations Known 4 mm L UVJ calculus on CT today (records in [facility])  - Admit to medicine  - Hold off on Abx for now as patient is afebrile, without leukocytosis and negative U/A. Would benefit from routine pat-operative abx as per urology protocols.   - Clear diet for now, NPO after midnight for possible stent placement with Dr. Mahajan tomorrow  - f/u Ucx  - s/p 1L NS bolus, will continue IVF LR 125cc/hr  - Pain control: mild Tylenol 650mg q6 PRN, moderate Toradol 30mg q6 PRN   - Zofran PRN for nausea/vomiting  - Urology following, will continue to follow recommendations

## 2019-06-18 NOTE — ED ADULT NURSE NOTE - PMH
Anxiety    Cholelithiasis    IBS (irritable bowel syndrome) Anxiety    Cholelithiasis    IBS (irritable bowel syndrome)    Kidney stone

## 2019-06-18 NOTE — H&P ADULT - PROBLEM SELECTOR PLAN 2
Appears to be constipation dominant  -protonix 40mg  -can consider starting BM regime should patient experience constipation CB with Cr doubled: 1.2 compared to 0.46 on 6/1  - s/p 1L NS bolus, continue IVF LR 125cc/hr  - f/u BMP in AM CB with Cr >doubled: 1.2 compared to 0.46 on 6/1  - s/p 1L NS bolus, continue IVF LR 125cc/hr  - f/u BMP in AM

## 2019-06-18 NOTE — CHART NOTE - NSCHARTNOTEFT_GEN_A_CORE
Search Terms: Carla Calvo, 1985     Search Date: 06/18/2019 06:13:22 PM     The Drug Utilization Report below displays all of the controlled substance prescriptions, if any, that your patient has filled in the last twelve months. The information displayed on this report is compiled from pharmacy submissions to the Department, and accurately reflects the information as submitted by the pharmacies.    This report was requested by: Heriberto Junior | Reference #: [id number]           Others' Prescriptions    Patient Name: Carla Calvo YOB: 1985   Address: [address] [place] [address] Sex: Female         Rx Written    Rx Dispensed    Drug    Quantity    Days Supply    Prescriber Name              06/02/2019 06/04/2019 clonazepam 0.5 mg tablet  60 30 Carlin Tovar     02/28/2019 03/03/2019 clonazepam 0.5 mg tablet  60 30 Carlin Tovar     12/11/2018 12/14/2018 clonazepam 0.5 mg tablet  60 30 Carlin Tovar     06/19/2018 06/19/2018 clonazepam 0.5 mg tablet  60 30 Carlin Tovar

## 2019-06-19 ENCOUNTER — RESULT REVIEW (OUTPATIENT)
Age: 34
End: 2019-06-19

## 2019-06-19 LAB
ANION GAP SERPL CALC-SCNC: 3 MMOL/L — LOW (ref 5–17)
BASOPHILS # BLD AUTO: 0.06 K/UL — SIGNIFICANT CHANGE UP (ref 0–0.2)
BASOPHILS NFR BLD AUTO: 1.2 % — SIGNIFICANT CHANGE UP (ref 0–2)
BUN SERPL-MCNC: 8 MG/DL — SIGNIFICANT CHANGE UP (ref 7–23)
CALCIUM SERPL-MCNC: 8.2 MG/DL — LOW (ref 8.5–10.1)
CHLORIDE SERPL-SCNC: 109 MMOL/L — HIGH (ref 96–108)
CO2 SERPL-SCNC: 30 MMOL/L — SIGNIFICANT CHANGE UP (ref 22–31)
CREAT SERPL-MCNC: 0.91 MG/DL — SIGNIFICANT CHANGE UP (ref 0.5–1.3)
CULTURE RESULTS: SIGNIFICANT CHANGE UP
EOSINOPHIL # BLD AUTO: 0.12 K/UL — SIGNIFICANT CHANGE UP (ref 0–0.5)
EOSINOPHIL NFR BLD AUTO: 2.4 % — SIGNIFICANT CHANGE UP (ref 0–6)
GLUCOSE SERPL-MCNC: 87 MG/DL — SIGNIFICANT CHANGE UP (ref 70–99)
HCT VFR BLD CALC: 31.2 % — LOW (ref 34.5–45)
HGB BLD-MCNC: 9.8 G/DL — LOW (ref 11.5–15.5)
IMM GRANULOCYTES NFR BLD AUTO: 0.4 % — SIGNIFICANT CHANGE UP (ref 0–1.5)
INR BLD: 1.03 RATIO — SIGNIFICANT CHANGE UP (ref 0.88–1.16)
LYMPHOCYTES # BLD AUTO: 1.68 K/UL — SIGNIFICANT CHANGE UP (ref 1–3.3)
LYMPHOCYTES # BLD AUTO: 33.4 % — SIGNIFICANT CHANGE UP (ref 13–44)
MCHC RBC-ENTMCNC: 28.5 PG — SIGNIFICANT CHANGE UP (ref 27–34)
MCHC RBC-ENTMCNC: 31.4 GM/DL — LOW (ref 32–36)
MCV RBC AUTO: 90.7 FL — SIGNIFICANT CHANGE UP (ref 80–100)
MONOCYTES # BLD AUTO: 0.51 K/UL — SIGNIFICANT CHANGE UP (ref 0–0.9)
MONOCYTES NFR BLD AUTO: 10.1 % — SIGNIFICANT CHANGE UP (ref 2–14)
NEUTROPHILS # BLD AUTO: 2.64 K/UL — SIGNIFICANT CHANGE UP (ref 1.8–7.4)
NEUTROPHILS NFR BLD AUTO: 52.5 % — SIGNIFICANT CHANGE UP (ref 43–77)
NRBC # BLD: 0 /100 WBCS — SIGNIFICANT CHANGE UP (ref 0–0)
PLATELET # BLD AUTO: 330 K/UL — SIGNIFICANT CHANGE UP (ref 150–400)
POTASSIUM SERPL-MCNC: 4.1 MMOL/L — SIGNIFICANT CHANGE UP (ref 3.5–5.3)
POTASSIUM SERPL-SCNC: 4.1 MMOL/L — SIGNIFICANT CHANGE UP (ref 3.5–5.3)
PROTHROM AB SERPL-ACNC: 11.7 SEC — SIGNIFICANT CHANGE UP (ref 10–12.9)
RBC # BLD: 3.44 M/UL — LOW (ref 3.8–5.2)
RBC # FLD: 13.1 % — SIGNIFICANT CHANGE UP (ref 10.3–14.5)
SODIUM SERPL-SCNC: 142 MMOL/L — SIGNIFICANT CHANGE UP (ref 135–145)
SPECIMEN SOURCE: SIGNIFICANT CHANGE UP
WBC # BLD: 5.03 K/UL — SIGNIFICANT CHANGE UP (ref 3.8–10.5)
WBC # FLD AUTO: 5.03 K/UL — SIGNIFICANT CHANGE UP (ref 3.8–10.5)

## 2019-06-19 PROCEDURE — 99233 SBSQ HOSP IP/OBS HIGH 50: CPT | Mod: GC

## 2019-06-19 PROCEDURE — 88300 SURGICAL PATH GROSS: CPT | Mod: 26

## 2019-06-19 RX ORDER — KETOROLAC TROMETHAMINE 30 MG/ML
10 SYRINGE (ML) INJECTION EVERY 6 HOURS
Refills: 0 | Status: DISCONTINUED | OUTPATIENT
Start: 2019-06-19 | End: 2019-06-20

## 2019-06-19 RX ORDER — ONDANSETRON 8 MG/1
4 TABLET, FILM COATED ORAL ONCE
Refills: 0 | Status: DISCONTINUED | OUTPATIENT
Start: 2019-06-19 | End: 2019-06-19

## 2019-06-19 RX ORDER — HYDROMORPHONE HYDROCHLORIDE 2 MG/ML
0.5 INJECTION INTRAMUSCULAR; INTRAVENOUS; SUBCUTANEOUS
Refills: 0 | Status: DISCONTINUED | OUTPATIENT
Start: 2019-06-19 | End: 2019-06-19

## 2019-06-19 RX ORDER — SODIUM CHLORIDE 9 MG/ML
1000 INJECTION, SOLUTION INTRAVENOUS
Refills: 0 | Status: DISCONTINUED | OUTPATIENT
Start: 2019-06-19 | End: 2019-06-19

## 2019-06-19 RX ORDER — PANTOPRAZOLE SODIUM 20 MG/1
40 TABLET, DELAYED RELEASE ORAL
Refills: 0 | Status: DISCONTINUED | OUTPATIENT
Start: 2019-06-19 | End: 2019-06-21

## 2019-06-19 RX ORDER — CEFAZOLIN SODIUM 1 G
2000 VIAL (EA) INJECTION EVERY 8 HOURS
Refills: 0 | Status: DISCONTINUED | OUTPATIENT
Start: 2019-06-19 | End: 2019-06-19

## 2019-06-19 RX ORDER — SODIUM CHLORIDE 9 MG/ML
1000 INJECTION, SOLUTION INTRAVENOUS
Refills: 0 | Status: DISCONTINUED | OUTPATIENT
Start: 2019-06-19 | End: 2019-06-21

## 2019-06-19 RX ORDER — PHENAZOPYRIDINE HCL 100 MG
200 TABLET ORAL THREE TIMES A DAY
Refills: 0 | Status: DISCONTINUED | OUTPATIENT
Start: 2019-06-19 | End: 2019-06-21

## 2019-06-19 RX ORDER — ESCITALOPRAM OXALATE 10 MG/1
20 TABLET, FILM COATED ORAL DAILY
Refills: 0 | Status: DISCONTINUED | OUTPATIENT
Start: 2019-06-19 | End: 2019-06-21

## 2019-06-19 RX ORDER — CEFAZOLIN SODIUM 1 G
2000 VIAL (EA) INJECTION EVERY 8 HOURS
Refills: 0 | Status: DISCONTINUED | OUTPATIENT
Start: 2019-06-19 | End: 2019-06-21

## 2019-06-19 RX ADMIN — Medication 100 MILLIGRAM(S): at 22:13

## 2019-06-19 RX ADMIN — PANTOPRAZOLE SODIUM 40 MILLIGRAM(S): 20 TABLET, DELAYED RELEASE ORAL at 05:02

## 2019-06-19 RX ADMIN — HYDROMORPHONE HYDROCHLORIDE 0.5 MILLIGRAM(S): 2 INJECTION INTRAMUSCULAR; INTRAVENOUS; SUBCUTANEOUS at 20:36

## 2019-06-19 RX ADMIN — Medication 0.5 MILLIGRAM(S): at 22:06

## 2019-06-19 RX ADMIN — ESCITALOPRAM OXALATE 20 MILLIGRAM(S): 10 TABLET, FILM COATED ORAL at 12:19

## 2019-06-19 RX ADMIN — Medication 200 MILLIGRAM(S): at 22:06

## 2019-06-19 RX ADMIN — SODIUM CHLORIDE 100 MILLILITER(S): 9 INJECTION, SOLUTION INTRAVENOUS at 20:30

## 2019-06-19 RX ADMIN — Medication 100 MILLIGRAM(S): at 14:53

## 2019-06-19 RX ADMIN — Medication 100 MILLIGRAM(S): at 05:02

## 2019-06-19 RX ADMIN — ONDANSETRON 4 MILLIGRAM(S): 8 TABLET, FILM COATED ORAL at 07:53

## 2019-06-19 RX ADMIN — SODIUM CHLORIDE 125 MILLILITER(S): 9 INJECTION, SOLUTION INTRAVENOUS at 12:19

## 2019-06-19 RX ADMIN — Medication 1 TABLET(S): at 14:53

## 2019-06-19 RX ADMIN — Medication 100 MILLIGRAM(S): at 14:52

## 2019-06-19 RX ADMIN — Medication 10 MILLIGRAM(S): at 23:58

## 2019-06-19 NOTE — BRIEF OPERATIVE NOTE - NSICDXBRIEFPROCEDURE_GEN_ALL_CORE_FT
PROCEDURES:  Cystoscopic extraction of ureteral stone 19-Jun-2019 19:51:06  James Dorsey  Cystoscopy, with retrograde pyelogram, adult 19-Jun-2019 19:50:54  James Dorsey

## 2019-06-19 NOTE — PROGRESS NOTE ADULT - PROBLEM SELECTOR PLAN 2
CB improving, BUN/Cr down from 11/1.2 to 8/.91 (previously 0.46 on 6/1)  - s/p 1L NS bolus, continue IVF LR 125cc/hr  - f/u BMP in AM

## 2019-06-19 NOTE — CONSULT NOTE ADULT - SUBJECTIVE AND OBJECTIVE BOX
HISTORY OF PRESENT ILLNESS:  34 yo F w/ PMHx of Anxiety, Cholelithiasis, IBS and nephrolithiasis presents to ED with acute onset of urge to urinate at 1AM followed by left flank pain, radiating to her groin, feeling similar to her previous episode of renal colic. She states the pain comes in waves, rated 10/10 at its worst. Reports nausea but no vomiting. Occasional dysuria while attempting to urinate, denies hematuria. Denies fever or chills but states her head feels hot now. Reports chronic bile reflux which causes her RUQ pain and for which she has outpatient workup scheduled at [facility] next month. Per ED chart review and [facility], case reviewed with Dr. Mahajan who stated that patient had CT scan today at [facility] revealing 4mm left UVJ stone.    Of note, recently admitted to [facility] on  for hydroureteronephrosis 2/2 4mm obstructing calculus.  placed stent on  and patient was treated for enterococcus UTI with Rocephin initially then transitioned to unasyn->amoxicillin. Stent was removed on  by Dr. Dorsey, however stone was not extracted during cystoscopy 2/ unable to visualize    In the ED, vitals T 98.4, HR 98-->117, /94, RR14, [id number]% on RA. Labs significant thrombocytosis (428), CMP grossly within normal limits. UA neg. EKG: NSR at 91 bpm, QT/QTc 368/452. Given 1L NS bolus, Toradol 30mg IV , lidocaine IVPB 110mg x1, lorazepam 1mg IV, zofran 4mg IV x2, tamsulosin .04mg. Urine culture pending.     The emergent stent that was passed last month gave pt extreme, incapacitating pain.  Based on that, the f/u procedure did not include ureteroscopy as it would have necessitateed another stent or ureteral catheter.  Pt now agrees to have USE today with stent insertion.  She has signif GI issues which are unrelated to the  status.  Pt had a prior  procedure 10 yrs ago and tolerated the stent well at that time.      PAST MEDICAL & SURGICAL HISTORY:  Kidney stone  IBS (irritable bowel syndrome)  Anxiety  Cholelithiasis  S/P cholecystectomy  History of bilateral breast reduction surgery  S/P sinus surgery  S/P rhinoplasty      REVIEW OF SYSTEMS:    MEDICATIONS  (STANDING):  ceFAZolin   IVPB 2000 milliGRAM(s) IV Intermittent every 8 hours  escitalopram 20 milliGRAM(s) Oral daily  lactated ringers. 1000 milliLiter(s) (125 mL/Hr) IV Continuous <Continuous>  pantoprazole    Tablet 40 milliGRAM(s) Oral before breakfast  phenazopyridine 100 milliGRAM(s) Oral every 8 hours    MEDICATIONS  (PRN):  acetaminophen   Tablet .. 650 milliGRAM(s) Oral every 6 hours PRN Temp greater or equal to 38C (100.4F), Mild Pain (1 - 3)  diphenhydrAMINE 25 milliGRAM(s) Oral at bedtime PRN Insomnia  Donnatal 1 Tablet(s) Oral every 8 hours PRN discomfort IBS  ketorolac   Injectable 30 milliGRAM(s) IV Push every 6 hours PRN Moderate Pain (4 - 6)  LORazepam     Tablet 0.5 milliGRAM(s) Oral every 8 hours PRN Anxiety  ondansetron Injectable 4 milliGRAM(s) IV Push every 6 hours PRN Nausea and/or Vomiting        Allergies    adhesives (Rash)  lactose (Other)  Reglan (Short breath)    Intolerances        FAMILY HISTORY:  No pertinent family history in first degree relatives      Vital Signs Last 24 Hrs  T(C): 36.5 (2019 05:06), Max: 36.9 (2019 12:41)  T(F): 97.7 (2019 05:06), Max: 98.4 (2019 12:41)  HR: 83 (2019 05:06) (81 - 117)  BP: 104/72 (2019 05:06) (104/72 - 151/98)  BP(mean): --  RR: 16 (2019 05:06) (12 - 20)  SpO2: 97% (2019 05:06) (97% - 100%)    PHYSICAL EXAM:    LABS:                        9.8    5.03  )-----------( 330      ( 2019 05:28 )             31.2     -    142  |  109<H>  |  8   ----------------------------<  87  4.1   |  30  |  0.91    Ca    8.2<L>      2019 05:28      PT/INR - ( 2019 05:28 )   PT: 11.7 sec;   INR: 1.03 ratio           Urinalysis Basic - ( 2019 13:16 )    Color: Pale Yellow / Appearance: Clear / S.010 / pH: x  Gluc: x / Ketone: Negative  / Bili: Negative / Urobili: Negative   Blood: x / Protein: Negative / Nitrite: Negative   Leuk Esterase: Negative / RBC: x / WBC x   Sq Epi: x / Non Sq Epi: x / Bacteria: x      Urine Culture:     RADIOLOGY & ADDITIONAL STUDIES:

## 2019-06-19 NOTE — PROGRESS NOTE ADULT - PROBLEM SELECTOR PLAN 1
Known 4 mm L UVJ calculus on CT 6/18 (records in [facility])  - Elen-operative Ancef as per urology  - NPO for possible stent placement with Dr. Dorsey today  - U/A negative; f/u Ucx  - s/p 1L NS bolus, will continue IVF LR 125cc/hr  - Pain control: mild Tylenol 650mg q6 PRN, moderate Toradol 30mg q6 PRN   - Zofran PRN for nausea/vomiting  - Urology following, will continue to follow recommendations

## 2019-06-19 NOTE — PROGRESS NOTE ADULT - PROBLEM SELECTOR PLAN 3
Constipation predominant, had more diarrhea in the past  - Protonix 40mg daily (on Omeprazole at home)  - Continue patient's home med Donnatal (Belladonna) q8h PRN for IBS symptoms  - Holding home Imipramine (patient states she was told to hold due to her kidney issues)  - Can consider starting BM regimen should patient experience worsening constipation

## 2019-06-20 DIAGNOSIS — N20.1 CALCULUS OF URETER: ICD-10-CM

## 2019-06-20 LAB
ANION GAP SERPL CALC-SCNC: 9 MMOL/L — SIGNIFICANT CHANGE UP (ref 5–17)
BUN SERPL-MCNC: 11 MG/DL — SIGNIFICANT CHANGE UP (ref 7–23)
CALCIUM SERPL-MCNC: 8.5 MG/DL — SIGNIFICANT CHANGE UP (ref 8.5–10.1)
CHLORIDE SERPL-SCNC: 103 MMOL/L — SIGNIFICANT CHANGE UP (ref 96–108)
CO2 SERPL-SCNC: 27 MMOL/L — SIGNIFICANT CHANGE UP (ref 22–31)
CREAT SERPL-MCNC: 0.74 MG/DL — SIGNIFICANT CHANGE UP (ref 0.5–1.3)
GLUCOSE SERPL-MCNC: 86 MG/DL — SIGNIFICANT CHANGE UP (ref 70–99)
HCT VFR BLD CALC: 32.8 % — LOW (ref 34.5–45)
HGB BLD-MCNC: 10.6 G/DL — LOW (ref 11.5–15.5)
MCHC RBC-ENTMCNC: 28.9 PG — SIGNIFICANT CHANGE UP (ref 27–34)
MCHC RBC-ENTMCNC: 32.3 GM/DL — SIGNIFICANT CHANGE UP (ref 32–36)
MCV RBC AUTO: 89.4 FL — SIGNIFICANT CHANGE UP (ref 80–100)
NRBC # BLD: 0 /100 WBCS — SIGNIFICANT CHANGE UP (ref 0–0)
PLATELET # BLD AUTO: 348 K/UL — SIGNIFICANT CHANGE UP (ref 150–400)
POTASSIUM SERPL-MCNC: 4.3 MMOL/L — SIGNIFICANT CHANGE UP (ref 3.5–5.3)
POTASSIUM SERPL-SCNC: 4.3 MMOL/L — SIGNIFICANT CHANGE UP (ref 3.5–5.3)
RBC # BLD: 3.67 M/UL — LOW (ref 3.8–5.2)
RBC # FLD: 12.8 % — SIGNIFICANT CHANGE UP (ref 10.3–14.5)
SODIUM SERPL-SCNC: 139 MMOL/L — SIGNIFICANT CHANGE UP (ref 135–145)
SURGICAL PATHOLOGY STUDY: SIGNIFICANT CHANGE UP
WBC # BLD: 5.18 K/UL — SIGNIFICANT CHANGE UP (ref 3.8–10.5)
WBC # FLD AUTO: 5.18 K/UL — SIGNIFICANT CHANGE UP (ref 3.8–10.5)

## 2019-06-20 PROCEDURE — 99233 SBSQ HOSP IP/OBS HIGH 50: CPT | Mod: GC

## 2019-06-20 RX ORDER — ONDANSETRON 8 MG/1
4 TABLET, FILM COATED ORAL EVERY 6 HOURS
Refills: 0 | Status: DISCONTINUED | OUTPATIENT
Start: 2019-06-20 | End: 2019-06-21

## 2019-06-20 RX ORDER — KETOROLAC TROMETHAMINE 30 MG/ML
10 SYRINGE (ML) INJECTION EVERY 6 HOURS
Refills: 0 | Status: DISCONTINUED | OUTPATIENT
Start: 2019-06-20 | End: 2019-06-21

## 2019-06-20 RX ORDER — ONDANSETRON 8 MG/1
4 TABLET, FILM COATED ORAL ONCE
Refills: 0 | Status: COMPLETED | OUTPATIENT
Start: 2019-06-20 | End: 2019-06-20

## 2019-06-20 RX ADMIN — Medication 100 MILLIGRAM(S): at 21:50

## 2019-06-20 RX ADMIN — Medication 10 MILLIGRAM(S): at 01:14

## 2019-06-20 RX ADMIN — Medication 100 MILLIGRAM(S): at 06:05

## 2019-06-20 RX ADMIN — Medication 10 MILLIGRAM(S): at 13:13

## 2019-06-20 RX ADMIN — Medication 10 MILLIGRAM(S): at 06:06

## 2019-06-20 RX ADMIN — Medication 10 MILLIGRAM(S): at 06:36

## 2019-06-20 RX ADMIN — ONDANSETRON 4 MILLIGRAM(S): 8 TABLET, FILM COATED ORAL at 21:53

## 2019-06-20 RX ADMIN — Medication 100 MILLIGRAM(S): at 13:45

## 2019-06-20 RX ADMIN — Medication 10 MILLIGRAM(S): at 22:50

## 2019-06-20 RX ADMIN — ESCITALOPRAM OXALATE 20 MILLIGRAM(S): 10 TABLET, FILM COATED ORAL at 11:43

## 2019-06-20 RX ADMIN — SODIUM CHLORIDE 100 MILLILITER(S): 9 INJECTION, SOLUTION INTRAVENOUS at 22:01

## 2019-06-20 RX ADMIN — Medication 200 MILLIGRAM(S): at 21:50

## 2019-06-20 RX ADMIN — Medication 10 MILLIGRAM(S): at 11:43

## 2019-06-20 RX ADMIN — ONDANSETRON 4 MILLIGRAM(S): 8 TABLET, FILM COATED ORAL at 12:16

## 2019-06-20 RX ADMIN — ONDANSETRON 4 MILLIGRAM(S): 8 TABLET, FILM COATED ORAL at 06:44

## 2019-06-20 RX ADMIN — Medication 10 MILLIGRAM(S): at 21:48

## 2019-06-20 RX ADMIN — Medication 200 MILLIGRAM(S): at 13:46

## 2019-06-20 RX ADMIN — Medication 0.5 MILLIGRAM(S): at 21:48

## 2019-06-20 RX ADMIN — Medication 10 MILLIGRAM(S): at 00:02

## 2019-06-20 RX ADMIN — PANTOPRAZOLE SODIUM 40 MILLIGRAM(S): 20 TABLET, DELAYED RELEASE ORAL at 06:07

## 2019-06-20 RX ADMIN — Medication 1 TABLET(S): at 14:39

## 2019-06-20 NOTE — PROGRESS NOTE ADULT - PROBLEM SELECTOR PLAN 3
Constipation predominant, had more diarrhea in the past  - Protonix 40mg daily (on Omeprazole at home)  - Continue patient's home med Donnatal (Belladonna) q8h PRN for IBS symptoms  - Holding home Imipramine (patient states she was told to hold due to her kidney issues)  - Can consider starting BM regimen should patient experience worsening constipation Constipation predominant, had more diarrhea in the past  - Protonix 40mg daily (on Omeprazole at home)  - Continue patient's home med Donnatal q8h PRN for IBS symptoms  - Holding home Imipramine (patient states she was told to hold due to her kidney issues)  - Can consider starting BM regimen should patient experience worsening constipation

## 2019-06-20 NOTE — PROGRESS NOTE ADULT - PROBLEM SELECTOR PLAN 2
CB improving, BUN/Cr down from 11/1.2 to 8/.91 (previously 0.46 on 6/1)  - s/p 1L NS bolus, continue IVF LR 125cc/hr  - f/u BMP in AM CB improved, BUN/Cr down to 11/.74 from 11/1.2 on admission (previously 0.46 on 6/1)  - s/p 1L NS bolus, continue IVF LR 100cc/hr  - f/u BMP in AM

## 2019-06-20 NOTE — PROGRESS NOTE ADULT - PROBLEM SELECTOR PLAN 5
DVT PPx with SCDs for now as patient is young without significant comorbid conditions and readily ambulatory    IMPROVE VTE Individual Risk Assessment  RISK                                                                Points  [  ] Previous VTE                                                  3  [  ] Thrombophilia                                               2  [  ] Lower limb paralysis                                      2        (unable to hold up >15 seconds)    [  ] Current Cancer                                              2         (within 6 months)  [  ] Immobilization > 24 hrs                                1  [  ] ICU/CCU stay > 24 hours                              1  [  ] Age > 60                                                      1  IMPROVE VTE Score 0    IMPROVE Score 0-1: Low Risk, No VTE prophylaxis required for most patients, encourage ambulation.   IMPROVE Score 2-3: At risk, pharmacologic VTE prophylaxis is indicated for most patients (in the absence of a contraindication)  IMPROVE Score > or = 4: High Risk, pharmacologic VTE prophylaxis is indicated for most patients (in the absence of a contraindication)
DVT PPx with SCDs for now as patient is young without significant comorbid conditions and readily ambulatory    IMPROVE VTE Individual Risk Assessment  RISK                                                                Points  [  ] Previous VTE                                                  3  [  ] Thrombophilia                                               2  [  ] Lower limb paralysis                                      2        (unable to hold up >15 seconds)    [  ] Current Cancer                                              2         (within 6 months)  [  ] Immobilization > 24 hrs                                1  [  ] ICU/CCU stay > 24 hours                              1  [  ] Age > 60                                                      1  IMPROVE VTE Score 0    IMPROVE Score 0-1: Low Risk, No VTE prophylaxis required for most patients, encourage ambulation.   IMPROVE Score 2-3: At risk, pharmacologic VTE prophylaxis is indicated for most patients (in the absence of a contraindication)  IMPROVE Score > or = 4: High Risk, pharmacologic VTE prophylaxis is indicated for most patients (in the absence of a contraindication)

## 2019-06-20 NOTE — PROGRESS NOTE ADULT - PROBLEM SELECTOR PLAN 4
Chronic  - Continue Ativan 0.5 mg q8h PRN for anxiety (on Klonopin 0.5mg at home)  - Continue home med Lexapro 20mg
Chronic  - Continue Ativan 0.5 mg q8h PRN for anxiety (on Klonopin 0.5mg at home)  - Continue home med Lexapro 20mg

## 2019-06-20 NOTE — PROGRESS NOTE ADULT - ATTENDING COMMENTS
Agree with plan and exam as above. Patient is at low risk for low-intermediate risk procedure. Patient medically optimized for urologic procedure.
Agree with plan and exam as above. Hernandez for 24 hours and remove in AM as per urology.

## 2019-06-20 NOTE — PROGRESS NOTE ADULT - PROBLEM SELECTOR PLAN 1
s/p cystoscopy with extraction of 5 mm stone in L distal ureter with hydro on 6/19  - Ancef as per urology  - U/A negative; UCx with < 10k normal urogenital nikhil  - s/p 1L NS bolus, will continue IVF LR 125cc/hr  - Pain control: mild Tylenol 650mg q6 PRN, moderate Toradol 30mg q6 PRN   - Zofran PRN for nausea/vomiting  - Urology following, will continue to follow recommendations s/p cystoscopy with extraction of 5 mm stone in L distal ureter with hydro on 6/19  - Ancef as per urology  - U/A negative; UCx with < 10k normal urogenital nikhil  - s/p 1L NS bolus, will continue IVF LR 100cc/hr  - Pain control with PO Toradol 10mg q6 PRN   - Zofran PRN for nausea/vomiting  - Clear liquid diet for now  - Urology following, will continue to follow recommendations

## 2019-06-21 ENCOUNTER — TRANSCRIPTION ENCOUNTER (OUTPATIENT)
Age: 34
End: 2019-06-21

## 2019-06-21 VITALS
DIASTOLIC BLOOD PRESSURE: 77 MMHG | HEART RATE: 93 BPM | OXYGEN SATURATION: 95 % | TEMPERATURE: 98 F | RESPIRATION RATE: 16 BRPM | SYSTOLIC BLOOD PRESSURE: 110 MMHG

## 2019-06-21 LAB
ANION GAP SERPL CALC-SCNC: 11 MMOL/L — SIGNIFICANT CHANGE UP (ref 5–17)
BUN SERPL-MCNC: 11 MG/DL — SIGNIFICANT CHANGE UP (ref 7–23)
CALCIUM SERPL-MCNC: 8.1 MG/DL — LOW (ref 8.5–10.1)
CHLORIDE SERPL-SCNC: 103 MMOL/L — SIGNIFICANT CHANGE UP (ref 96–108)
CO2 SERPL-SCNC: 26 MMOL/L — SIGNIFICANT CHANGE UP (ref 22–31)
CREAT SERPL-MCNC: 0.72 MG/DL — SIGNIFICANT CHANGE UP (ref 0.5–1.3)
GLUCOSE SERPL-MCNC: 62 MG/DL — LOW (ref 70–99)
HCT VFR BLD CALC: 32.5 % — LOW (ref 34.5–45)
HGB BLD-MCNC: 10.3 G/DL — LOW (ref 11.5–15.5)
MCHC RBC-ENTMCNC: 28.6 PG — SIGNIFICANT CHANGE UP (ref 27–34)
MCHC RBC-ENTMCNC: 31.7 GM/DL — LOW (ref 32–36)
MCV RBC AUTO: 90.3 FL — SIGNIFICANT CHANGE UP (ref 80–100)
NRBC # BLD: 0 /100 WBCS — SIGNIFICANT CHANGE UP (ref 0–0)
PLATELET # BLD AUTO: 326 K/UL — SIGNIFICANT CHANGE UP (ref 150–400)
POTASSIUM SERPL-MCNC: 3.7 MMOL/L — SIGNIFICANT CHANGE UP (ref 3.5–5.3)
POTASSIUM SERPL-SCNC: 3.7 MMOL/L — SIGNIFICANT CHANGE UP (ref 3.5–5.3)
RBC # BLD: 3.6 M/UL — LOW (ref 3.8–5.2)
RBC # FLD: 12.7 % — SIGNIFICANT CHANGE UP (ref 10.3–14.5)
SODIUM SERPL-SCNC: 140 MMOL/L — SIGNIFICANT CHANGE UP (ref 135–145)
WBC # BLD: 4.78 K/UL — SIGNIFICANT CHANGE UP (ref 3.8–10.5)
WBC # FLD AUTO: 4.78 K/UL — SIGNIFICANT CHANGE UP (ref 3.8–10.5)

## 2019-06-21 PROCEDURE — 84702 CHORIONIC GONADOTROPIN TEST: CPT

## 2019-06-21 PROCEDURE — 76000 FLUOROSCOPY <1 HR PHYS/QHP: CPT

## 2019-06-21 PROCEDURE — 85027 COMPLETE CBC AUTOMATED: CPT

## 2019-06-21 PROCEDURE — 99239 HOSP IP/OBS DSCHRG MGMT >30: CPT | Mod: GC

## 2019-06-21 PROCEDURE — 87086 URINE CULTURE/COLONY COUNT: CPT

## 2019-06-21 PROCEDURE — 82365 CALCULUS SPECTROSCOPY: CPT

## 2019-06-21 PROCEDURE — 80048 BASIC METABOLIC PNL TOTAL CA: CPT

## 2019-06-21 PROCEDURE — 85610 PROTHROMBIN TIME: CPT

## 2019-06-21 PROCEDURE — 99285 EMERGENCY DEPT VISIT HI MDM: CPT | Mod: 25

## 2019-06-21 PROCEDURE — 81003 URINALYSIS AUTO W/O SCOPE: CPT

## 2019-06-21 PROCEDURE — C1758: CPT

## 2019-06-21 PROCEDURE — 88300 SURGICAL PATH GROSS: CPT

## 2019-06-21 PROCEDURE — C1769: CPT

## 2019-06-21 PROCEDURE — C1889: CPT

## 2019-06-21 PROCEDURE — 36415 COLL VENOUS BLD VENIPUNCTURE: CPT

## 2019-06-21 PROCEDURE — 96376 TX/PRO/DX INJ SAME DRUG ADON: CPT

## 2019-06-21 PROCEDURE — 96365 THER/PROPH/DIAG IV INF INIT: CPT

## 2019-06-21 PROCEDURE — 93005 ELECTROCARDIOGRAM TRACING: CPT

## 2019-06-21 PROCEDURE — 96375 TX/PRO/DX INJ NEW DRUG ADDON: CPT

## 2019-06-21 RX ORDER — BELLADONNA ALKALOIDS 30MG/100ML
0 LIQUID (ML) MISCELLANEOUS
Qty: 0 | Refills: 0 | DISCHARGE

## 2019-06-21 RX ORDER — PHENAZOPYRIDINE HCL 100 MG
1 TABLET ORAL
Qty: 42 | Refills: 0
Start: 2019-06-21 | End: 2019-07-04

## 2019-06-21 RX ADMIN — Medication 200 MILLIGRAM(S): at 05:54

## 2019-06-21 RX ADMIN — ONDANSETRON 4 MILLIGRAM(S): 8 TABLET, FILM COATED ORAL at 05:54

## 2019-06-21 RX ADMIN — Medication 100 MILLIGRAM(S): at 05:54

## 2019-06-21 RX ADMIN — Medication 10 MILLIGRAM(S): at 05:54

## 2019-06-21 RX ADMIN — PANTOPRAZOLE SODIUM 40 MILLIGRAM(S): 20 TABLET, DELAYED RELEASE ORAL at 05:54

## 2019-06-21 RX ADMIN — ESCITALOPRAM OXALATE 20 MILLIGRAM(S): 10 TABLET, FILM COATED ORAL at 12:41

## 2019-06-21 RX ADMIN — Medication 0.5 MILLIGRAM(S): at 09:11

## 2019-06-21 RX ADMIN — Medication 10 MILLIGRAM(S): at 06:47

## 2019-06-21 RX ADMIN — Medication 1 TABLET(S): at 09:05

## 2019-06-21 RX ADMIN — Medication 200 MILLIGRAM(S): at 12:41

## 2019-06-21 NOTE — DISCHARGE NOTE PROVIDER - CARE PROVIDER_API CALL
Mica Salvador ()  Family Practice  [address] [place] [address]  Phone: [phone]  Fax: [phone]  Follow Up Time:     James Dorsey)  Urology  [address] [id number]  Phone: [phone]  Fax: [phone]  Follow Up Time:

## 2019-06-21 NOTE — PROGRESS NOTE ADULT - ASSESSMENT
34 yo female with PMH of Anxiety, Cholelithiasis, IBS and kidney stone presents to ED with left sided flank pain, 4 mm L UVJ calculus on CT, admitted for renal colic, s/p cystoscopy with extraction of stone 6/19 with Dr. Dorsey.
POD # 2  Afeb  Awake, alert, has mild left flank pain  Hernandez and ureteral catheter removed  Will advance diet, pt may be discharged when otherwise stable  For f/u our office in 3 mos, to discuss stone analysis at that time    Thank you
32 yo female with PMH of Anxiety, Cholelithiasis, IBS and kidney stone presents to ED with left sided flank pain, 4 mm L UVJ calculus on CT, admitted for renal colic, ureteral stent (vs. alternative tx option) placement today with Dr. Dorsey.

## 2019-06-21 NOTE — PROVIDER CONTACT NOTE (OTHER) - ACTION/TREATMENT ORDERED:
Call placed out to Dr. Bronson, No new orders, he will come in to examin patient
MD reviewed chart and ordered Lactated Ringers to be continued.

## 2019-06-21 NOTE — DISCHARGE NOTE PROVIDER - NSDCFUADDAPPT_GEN_ALL_CORE_FT
Please follow up with urology in 3 months.   Please follow up with your primary care doctor within 1 week of discharge.  Continue to follow with your gastroenterologist.

## 2019-06-21 NOTE — PROVIDER CONTACT NOTE (OTHER) - BACKGROUND
Pt admitted with renal colic PMH: kidney stone, IBS, anxiety, cholelithiasis, left ureteral calculus, CB

## 2019-06-21 NOTE — PROGRESS NOTE ADULT - SUBJECTIVE AND OBJECTIVE BOX
INTERVAL HPI/OVERNIGHT EVENTS: Patient seen and examined at bedside, s/p cystoscopy with extraction of stone. Reports continued nausea and L flank/groin pain, but alleviated by Zofran and pain regimen. Remains afebrile and HD stable.     MEDICATIONS  (STANDING):  ceFAZolin   IVPB 2000 milliGRAM(s) IV Intermittent every 8 hours  escitalopram 20 milliGRAM(s) Oral daily  lactated ringers. 1000 milliLiter(s) (100 mL/Hr) IV Continuous <Continuous>  pantoprazole    Tablet 40 milliGRAM(s) Oral before breakfast  phenazopyridine 200 milliGRAM(s) Oral three times a day    MEDICATIONS  (PRN):  Donnatal 1 Tablet(s) Oral every 8 hours PRN discomfort IBS  ketorolac 10 milliGRAM(s) Oral every 6 hours PRN Moderate Pain (4 - 6)  LORazepam     Tablet 0.5 milliGRAM(s) Oral every 6 hours PRN Anxiety  ondansetron Injectable 4 milliGRAM(s) IV Push every 6 hours PRN Nausea and/or Vomiting    Allergies  adhesives (Rash)  lactose (Other)  Reglan (Short breath)    Intolerances      CONSTITUTIONAL: No weakness, fevers or chills  RESPIRATORY: No cough, wheezing, hemoptysis; No shortness of breath  CVS: No chest pain or palpitations  GI: +L sided abdominal pain. +nausea. No vomiting, diarrhea or constipation. No melena or hematochezia.  : +L flank pain  Neuro: No weakness    All other review of systems is negative unless indicated above.    Vital Signs Last 24 Hrs  T(C): 36.6 (2019 04:58), Max: 37.1 (2019 13:23)  T(F): 97.8 (2019 04:58), Max: 98.8 (2019 13:23)  HR: 97 (2019 04:58) (81 - 100)  BP: 113/81 (2019 04:58) (113/81 - 145/97)  BP(mean): --  RR: 17 (2019 04:58) (12 - 18)  SpO2: 95% (2019 04:58) (92% - 100%)    General: NAD  Neurology: A&Ox3, nonfocal  Respiratory: CTA B/L  CV: RRR, S1S2  Abdominal: Soft, +tender at LUQ/LLQ, ND, +BS  : +L CVA tenderness  Extremities: No edema, +peripheral pulses      LABS:                        9.8    5.03  )-----------( 330      ( 2019 05:28 )             31.2     06-    142  |  109<H>  |  8   ----------------------------<  87  4.1   |  30  |  0.91    Ca    8.2<L>      2019 05:28      PT/INR - ( 2019 05:28 )   PT: 11.7 sec;   INR: 1.03 ratio      Urinalysis Basic - ( 2019 13:16 )  Color: Pale Yellow / Appearance: Clear / S.010 / pH: x  Gluc: x / Ketone: Negative  / Bili: Negative / Urobili: Negative   Blood: x / Protein: Negative / Nitrite: Negative   Leuk Esterase: Negative / RBC: x / WBC x   Sq Epi: x / Non Sq Epi: x / Bacteria: x      RADIOLOGY & ADDITIONAL TESTS:  CT scan  in [facility] showing 4mm left UVJ stone
INTERVAL HPI/OVERNIGHT EVENTS: c/o discomfort from giraldo    MEDICATIONS  (STANDING):  ceFAZolin   IVPB 2000 milliGRAM(s) IV Intermittent every 8 hours  escitalopram 20 milliGRAM(s) Oral daily  ketorolac 10 milliGRAM(s) Oral every 6 hours  lactated ringers. 1000 milliLiter(s) (100 mL/Hr) IV Continuous <Continuous>  pantoprazole    Tablet 40 milliGRAM(s) Oral before breakfast  phenazopyridine 200 milliGRAM(s) Oral three times a day    MEDICATIONS  (PRN):  LORazepam     Tablet 0.5 milliGRAM(s) Oral every 6 hours PRN Anxiety        Vital Signs Last 24 Hrs  T(C): 36.6 (2019 04:58), Max: 37.1 (2019 13:23)  T(F): 97.8 (2019 04:58), Max: 98.8 (2019 13:23)  HR: 97 (2019 04:58) (81 - 100)  BP: 113/81 (2019 04:58) (113/81 - 145/97)  BP(mean): --  RR: 17 (2019 04:58) (12 - 18)  SpO2: 95% (2019 04:58) (92% - 100%)        LABS:                        10.6   5.18  )-----------( 348      ( 2019 08:02 )             32.8     06-    142  |  109<H>  |  8   ----------------------------<  87  4.1   |  30  |  0.91    Ca    8.2<L>      2019 05:28      PT/INR - ( 2019 05:28 )   PT: 11.7 sec;   INR: 1.03 ratio           Urinalysis Basic - ( 2019 13:16 )    Color: Pale Yellow / Appearance: Clear / S.010 / pH: x  Gluc: x / Ketone: Negative  / Bili: Negative / Urobili: Negative   Blood: x / Protein: Negative / Nitrite: Negative   Leuk Esterase: Negative / RBC: x / WBC x   Sq Epi: x / Non Sq Epi: x / Bacteria: x      Urine culture:   @ 19:47 --   <10,000 CFU/mL Normal Urogenital Haliee      RADIOLOGY & ADDITIONAL TESTS:
PAST MEDICAL & SURGICAL HISTORY:  Kidney stone  IBS (irritable bowel syndrome)  Anxiety  Cholelithiasis  S/P cholecystectomy  History of bilateral breast reduction surgery  S/P sinus surgery  S/P rhinoplasty      REVIEW OF SYSTEMS:    MEDICATIONS  (STANDING):  ceFAZolin   IVPB 2000 milliGRAM(s) IV Intermittent every 8 hours  escitalopram 20 milliGRAM(s) Oral daily  lactated ringers. 1000 milliLiter(s) (100 mL/Hr) IV Continuous <Continuous>  pantoprazole    Tablet 40 milliGRAM(s) Oral before breakfast  phenazopyridine 200 milliGRAM(s) Oral three times a day    MEDICATIONS  (PRN):  Donnatal 1 Tablet(s) Oral every 8 hours PRN discomfort IBS  ketorolac 10 milliGRAM(s) Oral every 6 hours PRN Moderate Pain (4 - 6)  LORazepam     Tablet 0.5 milliGRAM(s) Oral every 6 hours PRN Anxiety  ondansetron Injectable 4 milliGRAM(s) IV Push every 6 hours PRN Nausea and/or Vomiting      PE: Abd soft, nontender    Allergies    adhesives (Rash)  lactose (Other)  Reglan (Short breath)    Intolerances        FAMILY HISTORY:  No pertinent family history in first degree relatives      Vital Signs Last 24 Hrs  T(C): 36.6 (21 Jun 2019 04:59), Max: 37.2 (20 Jun 2019 20:38)  T(F): 97.9 (21 Jun 2019 04:59), Max: 98.9 (20 Jun 2019 20:38)  HR: 82 (21 Jun 2019 04:59) (82 - 98)  BP: 114/75 (21 Jun 2019 04:59) (114/75 - 128/83)  BP(mean): --  RR: 16 (21 Jun 2019 04:59) (16 - 18)  SpO2: 95% (21 Jun 2019 04:59) (95% - 99%)    PHYSICAL EXAM:    LABS:                        10.6   5.18  )-----------( 348      ( 20 Jun 2019 08:02 )             32.8     06-20    139  |  103  |  11  ----------------------------<  86  4.3   |  27  |  0.74    Ca    8.5      20 Jun 2019 08:02          Urine Culture:     RADIOLOGY & ADDITIONAL STUDIES:
s/p general anesthesia  no complications noted
s/p general anesthesia  no complications noted at this time
INTERVAL HPI/OVERNIGHT EVENTS: Patient seen and examined at bedside. Reports nausea is manageable with Zofran, still having L flank pain radiating to her groin. Afebrile, HD stable, without acute events overnight.     MEDICATIONS  (STANDING):  ceFAZolin   IVPB 2000 milliGRAM(s) IV Intermittent every 8 hours  escitalopram 20 milliGRAM(s) Oral daily  lactated ringers. 1000 milliLiter(s) (125 mL/Hr) IV Continuous <Continuous>  pantoprazole    Tablet 40 milliGRAM(s) Oral before breakfast  phenazopyridine 100 milliGRAM(s) Oral every 8 hours    MEDICATIONS  (PRN):  acetaminophen   Tablet .. 650 milliGRAM(s) Oral every 6 hours PRN Temp greater or equal to 38C (100.4F), Mild Pain (1 - 3)  diphenhydrAMINE 25 milliGRAM(s) Oral at bedtime PRN Insomnia  Donnatal 1 Tablet(s) Oral every 8 hours PRN discomfort IBS  ketorolac   Injectable 30 milliGRAM(s) IV Push every 6 hours PRN Moderate Pain (4 - 6)  LORazepam     Tablet 0.5 milliGRAM(s) Oral every 8 hours PRN Anxiety  ondansetron Injectable 4 milliGRAM(s) IV Push every 6 hours PRN Nausea and/or Vomiting      Allergies  adhesives (Rash)  lactose (Other)  Reglan (Short breath)    Intolerances      CONSTITUTIONAL: No weakness, fevers or chills  RESPIRATORY: No cough, wheezing, hemoptysis; No shortness of breath  CVS: No chest pain or palpitations  GI: +L sided abdominal pain. No nausea, vomiting, diarrhea or constipation. No melena or hematochezia.  : +L flank pain  Neuro: No weakness    All other review of systems is negative unless indicated above.    Vital Signs Last 24 Hrs  T(C): 36.5 (2019 05:06), Max: 36.9 (2019 12:41)  T(F): 97.7 (2019 05:06), Max: 98.4 (2019 12:41)  HR: 83 (2019 05:06) (81 - 117)  BP: 104/72 (2019 05:06) (104/72 - 151/98)  BP(mean): --  RR: 16 (2019 05:06) (12 - 20)  SpO2: 97% (2019 05:06) (97% - 100%)    General: NAD  Neurology: A&Ox3, nonfocal  Respiratory: CTA B/L  CV: RRR, S1S2  Abdominal: Soft, NT, ND +BS  : +L CVA tenderness  Extremities: No edema, +peripheral pulses      LABS:                        9.8    5.03  )-----------( 330      ( 2019 05:28 )             31.2     06-    142  |  109<H>  |  8   ----------------------------<  87  4.1   |  30  |  0.91    Ca    8.2<L>      2019 05:28      PT/INR - ( 2019 05:28 )   PT: 11.7 sec;   INR: 1.03 ratio         Urinalysis Basic - ( 2019 13:16 )  Color: Pale Yellow / Appearance: Clear / S.010 / pH: x  Gluc: x / Ketone: Negative  / Bili: Negative / Urobili: Negative   Blood: x / Protein: Negative / Nitrite: Negative   Leuk Esterase: Negative / RBC: x / WBC x   Sq Epi: x / Non Sq Epi: x / Bacteria: x      RADIOLOGY & ADDITIONAL TESTS:  CT scan  in [facility] showing 4mm left UVJ stone

## 2019-06-21 NOTE — PROGRESS NOTE ADULT - REASON FOR ADMISSION
Renal colic on left side

## 2019-06-21 NOTE — DISCHARGE NOTE PROVIDER - NSDCCPCAREPLAN_GEN_ALL_CORE_FT
PRINCIPAL DISCHARGE DIAGNOSIS  Diagnosis: Renal colic on left side  Assessment and Plan of Treatment: You had a cystoscopy with extraction of a 5 mm stone from your L distal ureter performed on 6/19/19. You received IV antibiotics and IV fluids while in the hospital. Please follow up with urology within 1 week of discharge from the hospital.      SECONDARY DISCHARGE DIAGNOSES  Diagnosis: IBS (irritable bowel syndrome)  Assessment and Plan of Treatment: Please continue protonix and donnatal as prescribed. Continue to follow with your gastroenterologist regarding restarting other IBS medications.    Diagnosis: Anxiety  Assessment and Plan of Treatment: Continue klonipin and lexapro as prescribed. Do not take klonopin if you feel lethargic or have respiratory distress.    Diagnosis: CB (acute kidney injury)  Assessment and Plan of Treatment: You received IV fluids and your kidney function returned back to normal. Continue to follow up with your primary care doctor. Continue to stay hydrated throughout the day. PRINCIPAL DISCHARGE DIAGNOSIS  Diagnosis: Renal colic on left side  Assessment and Plan of Treatment: You had a cystoscopy with extraction of a 5 mm stone from your L distal ureter performed on 6/19/19. Continue pyridium as needed three times a day for urinary burning. You received IV antibiotics and IV fluids while in the hospital. Please follow up with urology within 1 week of discharge from the hospital.      SECONDARY DISCHARGE DIAGNOSES  Diagnosis: IBS (irritable bowel syndrome)  Assessment and Plan of Treatment: Please continue protonix and donnatal as prescribed. Continue to follow with your gastroenterologist regarding restarting other IBS medications.    Diagnosis: Anxiety  Assessment and Plan of Treatment: Continue klonipin and lexapro as prescribed. Do not take klonopin if you feel lethargic or have respiratory distress.    Diagnosis: CB (acute kidney injury)  Assessment and Plan of Treatment: You received IV fluids and your kidney function returned back to normal. Continue to follow up with your primary care doctor. Continue to stay hydrated throughout the day.

## 2019-06-21 NOTE — DISCHARGE NOTE PROVIDER - NSDCACTIVITY_GEN_ALL_CORE
Bathing allowed/Showering allowed/Stairs allowed/Walking - Indoors allowed/Driving allowed/Walking - Outdoors allowed

## 2019-06-21 NOTE — DISCHARGE NOTE PROVIDER - HOSPITAL COURSE
FROM ADMISSION H+P:     HPI:    32 yo F w/ PMHx of Anxiety, Cholelithiasis, IBS and nephrolithiasis presents to ED with acute onset of urge to urinate at 1AM followed by left flank pain, radiating to her groin, feeling similar to her previous episode of renal colic. She states the pain comes in waves, rated 10/10 at its worst. Reports nausea but no vomiting. Occasional dysuria while attempting to urinate, denies hematuria. Denies fever or chills but states her head feels hot now. Reports chronic bile reflux which causes her RUQ pain and for which she has outpatient workup scheduled at [facility] next month. Per ED chart review and [facility], case reviewed with Dr. Mahajan who stated that patient had CT scan today at [facility] revealing 4mm left UVJ stone.        Of note, recently admitted to [facility] on 5/28 for hydroureteronephrosis 2/2 4mm obstructing calculus.  placed stent on 5/29 and patient was treated for enterococcus UTI with Rocephin initially then transitioned to unasyn->amoxicillin. Stent was removed on 6/6 by Dr. Dorsey, however stone was not extracted during cystoscopy 2/2 unable to visualize        In the ED, vitals T 98.4, HR 98-->117, /94, RR14, [id number]% on RA. Labs significant thrombocytosis (428), CMP grossly within normal limits. UA neg. EKG: NSR at 91 bpm, QT/QTc 368/452. Given 1L NS bolus, Toradol 30mg IV , lidocaine IVPB 110mg x1, lorazepam 1mg IV, zofran 4mg IV x2, tamsulosin .04mg. Urine culture pending. (18 Jun 2019 17:29)            ---    HOSPITAL COURSE:     cystoscopy with extraction of a 5 mm stone from your L distal ureter performed on 6/19/19. You received IV antibiotics and IV fluids while in the hospital. Please follow up with urology within 1 week of discharge from the hospital.    ---    CONSULTANTS:         ---    TIME SPENT:    The total amount of time spent reviewing the hospital notes, laboratory values, imaging findings, assessing/counseling the patient, discussing with consultant physicians, social work, nursing staff took -- minutes        ---    Primary care provider was made aware of plan for discharge:      [  ] NO     [  ] YES FROM ADMISSION H+P:     HPI:    32 yo F w/ PMHx of Anxiety, Cholelithiasis, IBS and nephrolithiasis presents to ED with acute onset of urge to urinate at 1AM followed by left flank pain, radiating to her groin, feeling similar to her previous episode of renal colic. She states the pain comes in waves, rated 10/10 at its worst. Reports nausea but no vomiting. Occasional dysuria while attempting to urinate, denies hematuria. Denies fever or chills but states her head feels hot now. Reports chronic bile reflux which causes her RUQ pain and for which she has outpatient workup scheduled at [facility] next month. Per ED chart review and [facility], case reviewed with Dr. Mahajan who stated that patient had CT scan today at [facility] revealing 4mm left UVJ stone.        Of note, recently admitted to [facility] on 5/28 for hydroureteronephrosis 2/2 4mm obstructing calculus.  placed stent on 5/29 and patient was treated for enterococcus UTI with Rocephin initially then transitioned to unasyn->amoxicillin. Stent was removed on 6/6 by Dr. Dorsey, however stone was not extracted during cystoscopy 2/2 unable to visualize        In the ED, vitals T 98.4, HR 98-->117, /94, RR14, [id number]% on RA. Labs significant thrombocytosis (428), CMP grossly within normal limits. UA neg. EKG: NSR at 91 bpm, QT/QTc 368/452. Given 1L NS bolus, Toradol 30mg IV , lidocaine IVPB 110mg x1, lorazepam 1mg IV, zofran 4mg IV x2, tamsulosin .04mg. Urine culture pending. (18 Jun 2019 17:29)            ---    HOSPITAL COURSE:     cystoscopy with extraction of a 5 mm stone from L distal ureter performed on 6/19/19. Patient had giralod placed. Received IV antibiotics and IV fluids while in the hospital. follow up with urology within 3 months of discharge from the hospital for stone analysis. Giraldo removed and patient remained stable during rest of hospital course.    ---    CONSULTANTS:     Urology Dr Dorsey FROM ADMISSION H+P:     34 yo F w/ PMHx of Anxiety, Cholelithiasis, IBS and nephrolithiasis presents to ED with acute onset of urge to urinate at 1AM followed by left flank pain, radiating to her groin, feeling similar to her previous episode of renal colic. She states the pain comes in waves, rated 10/10 at its worst. Reports nausea but no vomiting. Occasional dysuria while attempting to urinate, denies hematuria. Denies fever or chills but states her head feels hot now. Reports chronic bile reflux which causes her RUQ pain and for which she has outpatient workup scheduled at [facility] next month. Per ED chart review and [facility], case reviewed with Dr. Mahajan who stated that patient had CT scan today at [facility] revealing 4mm left UVJ stone.    Of note, recently admitted to [facility] on 5/28 for hydroureteronephrosis 2/2 4mm obstructing calculus.  placed stent on 5/29 and patient was treated for enterococcus UTI with Rocephin initially then transitioned to unasyn->amoxicillin. Stent was removed on 6/6 by Dr. Dorsey, however stone was not extracted during cystoscopy 2/2 unable to visualize    In the ED, vitals T 98.4, HR 98-->117, /94, RR14, [id number]% on RA. Labs significant thrombocytosis (428), CMP grossly within normal limits. UA neg. EKG: NSR at 91 bpm, QT/QTc 368/452. Given 1L NS bolus, Toradol 30mg IV , lidocaine IVPB 110mg x1, lorazepam 1mg IV, zofran 4mg IV x2, tamsulosin .04mg. Urine culture pending. (18 Jun 2019 17:29)            ---    HOSPITAL COURSE:     Urology (Dr Dorsey) was consulted. Patient had cystoscopy with extraction of a 5 mm stone from L distal ureter performed on 6/19/19. Patient had giraldo placed. Received IV antibiotics and IV fluids while in the hospital. Patient to follow up with urology within 3 months of discharge from the hospital for stone analysis. Giraldo removed and patient remained stable during rest of hospital course.        Patient responded well to medical treatment during hospitalization. Patient was seen and examined on the day of discharge and is medically optimized for discharge, with close outpatient follow up.        Vital Signs Last 24 Hrs    T(C): 36.6 (21 Jun 2019 04:59), Max: 37.2 (20 Jun 2019 20:38)    T(F): 97.9 (21 Jun 2019 04:59), Max: 98.9 (20 Jun 2019 20:38)    HR: 82 (21 Jun 2019 04:59) (82 - 98)    BP: 114/75 (21 Jun 2019 04:59) (114/75 - 128/83)    BP(mean): --    RR: 16 (21 Jun 2019 04:59) (16 - 18)    SpO2: 95% (21 Jun 2019 04:59) (95% - 99%)        General: NAD    Neurology: A&Ox3, nonfocal    Respiratory: CTA B/L    CV: RRR, S1S2    Abdominal: Soft, +tender at LUQ/LLQ (improving), ND, +BS    : +L CVA tenderness (improving)    Extremities: No edema, +peripheral pulses

## 2019-06-21 NOTE — DISCHARGE NOTE NURSING/CASE MANAGEMENT/SOCIAL WORK - NSDCDPATPORTLINK_GEN_ALL_CORE
You can access the [facility] Patient Portal, offered by [facility], by registering with the following website: [email or web address]

## 2019-06-24 LAB — COMPN STONE: SIGNIFICANT CHANGE UP

## 2019-08-29 ENCOUNTER — EMERGENCY (EMERGENCY)
Facility: HOSPITAL | Age: 34
LOS: 1 days | Discharge: HOME | End: 2019-08-29
Attending: EMERGENCY MEDICINE | Admitting: EMERGENCY MEDICINE
Payer: COMMERCIAL

## 2019-08-29 VITALS
OXYGEN SATURATION: 96 % | SYSTOLIC BLOOD PRESSURE: 128 MMHG | RESPIRATION RATE: 20 BRPM | DIASTOLIC BLOOD PRESSURE: 84 MMHG | WEIGHT: 162.04 LBS | HEART RATE: 124 BPM | TEMPERATURE: 98 F

## 2019-08-29 VITALS
HEART RATE: 98 BPM | RESPIRATION RATE: 16 BRPM | DIASTOLIC BLOOD PRESSURE: 76 MMHG | SYSTOLIC BLOOD PRESSURE: 124 MMHG | TEMPERATURE: 98 F | OXYGEN SATURATION: 100 %

## 2019-08-29 DIAGNOSIS — Z90.49 ACQUIRED ABSENCE OF OTHER SPECIFIED PARTS OF DIGESTIVE TRACT: Chronic | ICD-10-CM

## 2019-08-29 DIAGNOSIS — Z90.13 ACQUIRED ABSENCE OF BILATERAL BREASTS AND NIPPLES: Chronic | ICD-10-CM

## 2019-08-29 PROBLEM — N20.0 CALCULUS OF KIDNEY: Chronic | Status: ACTIVE | Noted: 2019-06-18

## 2019-08-29 LAB
ALBUMIN SERPL ELPH-MCNC: 3.3 G/DL — SIGNIFICANT CHANGE UP (ref 3.3–5)
ALP SERPL-CCNC: 56 U/L — SIGNIFICANT CHANGE UP (ref 40–120)
ALT FLD-CCNC: 67 U/L — SIGNIFICANT CHANGE UP (ref 12–78)
ANION GAP SERPL CALC-SCNC: 8 MMOL/L — SIGNIFICANT CHANGE UP (ref 5–17)
APPEARANCE UR: CLEAR — SIGNIFICANT CHANGE UP
AST SERPL-CCNC: 148 U/L — HIGH (ref 15–37)
BASOPHILS # BLD AUTO: 0.05 K/UL — SIGNIFICANT CHANGE UP (ref 0–0.2)
BASOPHILS NFR BLD AUTO: 0.7 % — SIGNIFICANT CHANGE UP (ref 0–2)
BILIRUB SERPL-MCNC: 0.5 MG/DL — SIGNIFICANT CHANGE UP (ref 0.2–1.2)
BILIRUB UR-MCNC: NEGATIVE — SIGNIFICANT CHANGE UP
BUN SERPL-MCNC: 8 MG/DL — SIGNIFICANT CHANGE UP (ref 7–23)
CALCIUM SERPL-MCNC: 8.2 MG/DL — LOW (ref 8.5–10.1)
CHLORIDE SERPL-SCNC: 108 MMOL/L — SIGNIFICANT CHANGE UP (ref 96–108)
CO2 SERPL-SCNC: 26 MMOL/L — SIGNIFICANT CHANGE UP (ref 22–31)
COLOR SPEC: SIGNIFICANT CHANGE UP
CREAT SERPL-MCNC: 0.56 MG/DL — SIGNIFICANT CHANGE UP (ref 0.5–1.3)
DIFF PNL FLD: NEGATIVE — SIGNIFICANT CHANGE UP
EOSINOPHIL # BLD AUTO: 0.16 K/UL — SIGNIFICANT CHANGE UP (ref 0–0.5)
EOSINOPHIL NFR BLD AUTO: 2.1 % — SIGNIFICANT CHANGE UP (ref 0–6)
GLUCOSE SERPL-MCNC: 83 MG/DL — SIGNIFICANT CHANGE UP (ref 70–99)
GLUCOSE UR QL: NEGATIVE — SIGNIFICANT CHANGE UP
HCT VFR BLD CALC: 34.5 % — SIGNIFICANT CHANGE UP (ref 34.5–45)
HGB BLD-MCNC: 11.4 G/DL — LOW (ref 11.5–15.5)
IMM GRANULOCYTES NFR BLD AUTO: 0.3 % — SIGNIFICANT CHANGE UP (ref 0–1.5)
KETONES UR-MCNC: NEGATIVE — SIGNIFICANT CHANGE UP
LEUKOCYTE ESTERASE UR-ACNC: NEGATIVE — SIGNIFICANT CHANGE UP
LYMPHOCYTES # BLD AUTO: 1.69 K/UL — SIGNIFICANT CHANGE UP (ref 1–3.3)
LYMPHOCYTES # BLD AUTO: 22.7 % — SIGNIFICANT CHANGE UP (ref 13–44)
MCHC RBC-ENTMCNC: 29.4 PG — SIGNIFICANT CHANGE UP (ref 27–34)
MCHC RBC-ENTMCNC: 33 GM/DL — SIGNIFICANT CHANGE UP (ref 32–36)
MCV RBC AUTO: 88.9 FL — SIGNIFICANT CHANGE UP (ref 80–100)
MONOCYTES # BLD AUTO: 0.65 K/UL — SIGNIFICANT CHANGE UP (ref 0–0.9)
MONOCYTES NFR BLD AUTO: 8.7 % — SIGNIFICANT CHANGE UP (ref 2–14)
NEUTROPHILS # BLD AUTO: 4.88 K/UL — SIGNIFICANT CHANGE UP (ref 1.8–7.4)
NEUTROPHILS NFR BLD AUTO: 65.5 % — SIGNIFICANT CHANGE UP (ref 43–77)
NITRITE UR-MCNC: NEGATIVE — SIGNIFICANT CHANGE UP
NRBC # BLD: 0 /100 WBCS — SIGNIFICANT CHANGE UP (ref 0–0)
PH UR: 8 — SIGNIFICANT CHANGE UP (ref 5–8)
PLATELET # BLD AUTO: 288 K/UL — SIGNIFICANT CHANGE UP (ref 150–400)
POTASSIUM SERPL-MCNC: 3.6 MMOL/L — SIGNIFICANT CHANGE UP (ref 3.5–5.3)
POTASSIUM SERPL-SCNC: 3.6 MMOL/L — SIGNIFICANT CHANGE UP (ref 3.5–5.3)
PROT SERPL-MCNC: 6.2 G/DL — SIGNIFICANT CHANGE UP (ref 6–8.3)
PROT UR-MCNC: NEGATIVE — SIGNIFICANT CHANGE UP
RBC # BLD: 3.88 M/UL — SIGNIFICANT CHANGE UP (ref 3.8–5.2)
RBC # FLD: 12.4 % — SIGNIFICANT CHANGE UP (ref 10.3–14.5)
SODIUM SERPL-SCNC: 142 MMOL/L — SIGNIFICANT CHANGE UP (ref 135–145)
SP GR SPEC: 1 — LOW (ref 1.01–1.02)
UROBILINOGEN FLD QL: NEGATIVE — SIGNIFICANT CHANGE UP
WBC # BLD: 7.45 K/UL — SIGNIFICANT CHANGE UP (ref 3.8–10.5)
WBC # FLD AUTO: 7.45 K/UL — SIGNIFICANT CHANGE UP (ref 3.8–10.5)

## 2019-08-29 PROCEDURE — 85027 COMPLETE CBC AUTOMATED: CPT

## 2019-08-29 PROCEDURE — 80053 COMPREHEN METABOLIC PANEL: CPT

## 2019-08-29 PROCEDURE — 87086 URINE CULTURE/COLONY COUNT: CPT

## 2019-08-29 PROCEDURE — 99284 EMERGENCY DEPT VISIT MOD MDM: CPT | Mod: 25

## 2019-08-29 PROCEDURE — 81003 URINALYSIS AUTO W/O SCOPE: CPT

## 2019-08-29 PROCEDURE — 96361 HYDRATE IV INFUSION ADD-ON: CPT

## 2019-08-29 PROCEDURE — 96375 TX/PRO/DX INJ NEW DRUG ADDON: CPT

## 2019-08-29 PROCEDURE — 36415 COLL VENOUS BLD VENIPUNCTURE: CPT

## 2019-08-29 PROCEDURE — 96374 THER/PROPH/DIAG INJ IV PUSH: CPT

## 2019-08-29 PROCEDURE — 87186 SC STD MICRODIL/AGAR DIL: CPT

## 2019-08-29 PROCEDURE — 99284 EMERGENCY DEPT VISIT MOD MDM: CPT

## 2019-08-29 RX ORDER — SUCRALFATE 1 G
1 TABLET ORAL ONCE
Refills: 0 | Status: COMPLETED | OUTPATIENT
Start: 2019-08-29 | End: 2019-08-29

## 2019-08-29 RX ORDER — SODIUM CHLORIDE 9 MG/ML
1000 INJECTION INTRAMUSCULAR; INTRAVENOUS; SUBCUTANEOUS ONCE
Refills: 0 | Status: COMPLETED | OUTPATIENT
Start: 2019-08-29 | End: 2019-08-29

## 2019-08-29 RX ORDER — PANTOPRAZOLE SODIUM 20 MG/1
40 TABLET, DELAYED RELEASE ORAL ONCE
Refills: 0 | Status: COMPLETED | OUTPATIENT
Start: 2019-08-29 | End: 2019-08-29

## 2019-08-29 RX ORDER — ONDANSETRON 8 MG/1
4 TABLET, FILM COATED ORAL ONCE
Refills: 0 | Status: COMPLETED | OUTPATIENT
Start: 2019-08-29 | End: 2019-08-29

## 2019-08-29 RX ADMIN — SODIUM CHLORIDE 1000 MILLILITER(S): 9 INJECTION INTRAMUSCULAR; INTRAVENOUS; SUBCUTANEOUS at 02:04

## 2019-08-29 RX ADMIN — Medication 1 GRAM(S): at 02:11

## 2019-08-29 RX ADMIN — SODIUM CHLORIDE 1000 MILLILITER(S): 9 INJECTION INTRAMUSCULAR; INTRAVENOUS; SUBCUTANEOUS at 03:01

## 2019-08-29 RX ADMIN — SODIUM CHLORIDE 1000 MILLILITER(S): 9 INJECTION INTRAMUSCULAR; INTRAVENOUS; SUBCUTANEOUS at 04:34

## 2019-08-29 RX ADMIN — Medication 1 MILLIGRAM(S): at 04:11

## 2019-08-29 RX ADMIN — SODIUM CHLORIDE 1000 MILLILITER(S): 9 INJECTION INTRAMUSCULAR; INTRAVENOUS; SUBCUTANEOUS at 05:37

## 2019-08-29 RX ADMIN — SODIUM CHLORIDE 1000 MILLILITER(S): 9 INJECTION INTRAMUSCULAR; INTRAVENOUS; SUBCUTANEOUS at 03:30

## 2019-08-29 RX ADMIN — ONDANSETRON 4 MILLIGRAM(S): 8 TABLET, FILM COATED ORAL at 02:10

## 2019-08-29 RX ADMIN — Medication 25 MILLIGRAM(S): at 03:41

## 2019-08-29 RX ADMIN — PANTOPRAZOLE SODIUM 40 MILLIGRAM(S): 20 TABLET, DELAYED RELEASE ORAL at 02:11

## 2019-08-29 RX ADMIN — SODIUM CHLORIDE 1000 MILLILITER(S): 9 INJECTION INTRAMUSCULAR; INTRAVENOUS; SUBCUTANEOUS at 05:54

## 2019-08-29 NOTE — ED ADULT NURSE NOTE - GASTROINTESTINAL ASSESSMENT
Immediate Brief Procedure Note    Patient: Dawson Arredondo    Pre-op Dx: Epiretinal membrane with macular edema left eye    Post-op Dx: same    Procedure: Left eye vitrectomy, ILM peel    Surgeon:  Yamila Fair MD    Assistants: DEVON Shankar    Anesthesia Staff: WILFRID: Nam Galindo CRNA  Anesthesiologist: Dawson Joseph MD    Anesthesia Type: MAC    Findings: same as diagnosis    Estimated Blood Loss: less than one cc    Complications: none    Specimens Removed: none   - - -

## 2019-08-29 NOTE — ED PROVIDER NOTE - PROGRESS NOTE DETAILS
pt reevaluted, feeling better, sleeping in stretcher, spasms have subsided, pt to be d/c home with phenergan, follow up with GI, return if any symtpoms worsen

## 2019-08-29 NOTE — ED ADULT NURSE REASSESSMENT NOTE - NS ED NURSE REASSESS COMMENT FT1
Patient went to the bathroom accompanied by Mom, offered a wheelchair but refused. While patient is in the bathroom patient felt dizzy but did not fall, immediately placed her in wheelchair and back to bed. Seen by Dr. Gomez. Vital signs taken and recorded, oxygen given via non rebreather mask and placed patient on cardiac monitor. IV fluids as ordered continued.  Will continue to monitor.

## 2019-08-29 NOTE — ED PROVIDER NOTE - PATIENT PORTAL LINK FT
You can access the FollowMyHealth Patient Portal offered by [facility] by registering at the following website: [email or web address]. By joining [employer]’s FollowMyHealth portal, you will also be able to view your health information using other applications (apps) compatible with our system.

## 2019-08-29 NOTE — ED PROVIDER NOTE - OBJECTIVE STATEMENT
32yo female bib ems with abd pain tonite. pt has hx of IBS and has been going to [facility] and was diagnosed with pelvic floor dysfunction and gastroparesis has been going to PT and doing biofeedback with improvement until tonite when she had cramping abd pain and diarrhea, no fever, +nausea, pt took her zofran and donnatol with some relief but cannot alleviate the cramps and spasms

## 2019-08-29 NOTE — ED PROVIDER NOTE - CARE PLAN
Principal Discharge DX:	IBS (irritable bowel syndrome) Principal Discharge DX:	IBS (irritable bowel syndrome)  Secondary Diagnosis:	Nausea alone

## 2019-10-21 NOTE — ED PROVIDER NOTE - NS_EDPROVIDERDISPOUSERTYPE_ED_A_ED
oriented to person, place, time and situation Attending Attestation (For Attendings USE Only)... situation/unable to ID date, month, year/person

## 2019-10-22 NOTE — ASU PREOP CHECKLIST - HEIGHT IN CM
[facility]    Obstetrics & Gynecology  History & Physical      Patient Name:  Carolyn Pearson  MRN:  [id number]  Admission Date:  10/22/2019  Hospital Length of Stay:  0  Attending Physician:  Andrew Barrera MD    Date:  10/22/2019     Chief Complaint:  Repeat  section    History of Present Illness:      Carolyn Pearson is a 25 y.o.  at 39w2d who presents to L&D for a scheduled repeat  delivery secondary to declined trial of labor after  to avoid the risk of uterine rupture.  Pt has no major complaints this morning.  Pt denies any vaginal bleeding, leakage of fluid, contractions, and notes active fetal movement.  Pt antepartum course has been overall uneventful.    Past Medical History:      Diagnosis Date    Miscarriage        Past Surgical History:     Procedure Laterality Date     SECTION      FRACTURE SURGERY      left leg    LEG SURGERY       Medications:     No current facility-administered medications on file prior to encounter.      Current Outpatient Medications on File Prior to Encounter   Medication Sig Dispense Refill    docusate sodium (COLACE) 100 MG capsule Take 1 capsule (100 mg total) by mouth 2 (two) times daily as needed for Constipation. 60 capsule 1    ferrous sulfate (FEOSOL) 325 mg (65 mg iron) Tab tablet Take 1 tablet (325 mg total) by mouth 2 (two) times daily. 60 tablet 1    ondansetron (ZOFRAN) 4 MG tablet TAKE 1 TABLET BY MOUTH DAILY AS NEEDED FOR NAUSEA 10 tablet 0    ondansetron (ZOFRAN-ODT) 4 MG TbDL Take 1 tablet (4 mg total) by mouth every 6 (six) hours as needed. 30 tablet 1    prenatal 25/iron fum/folic/dha (PRENATAL-1 ORAL) Take by mouth.      promethazine (PHENERGAN) 25 MG suppository Place 1 suppository (25 mg total) rectally every 6 (six) hours as needed for Nausea. 10 suppository 0    pyridoxine, vitamin B6, (VITAMIN B-6) 25 MG Tab Take 25 mg by mouth once daily.      ranitidine (ZANTAC) 150 MG capsule Take 1  "capsule (150 mg total) by mouth nightly. 20 capsule 0     Allergies:      NKDA      Obstetric History:       Para Term  AB Living   6 1 1   4 1   SAB TAB Ectopic Multiple Live Births   2 2   0 1      # Outcome Date GA Lbr Ok/2nd Weight Sex Delivery Anes PTL Lv   6 Current            5 SAB 2018           4 Term 14 39w2d  3.285 kg (7 lb 3.9 oz) F CS-LTranv EPI N DAMEON   3 SAB            2 TAB            1 TAB               Obstetric Comments   Gynhx: reg/5. Mod flow, mild cramp   Denies abnl pap, last pap 2017 neg   H/o trichomonas     Social History:      Former tobacco  Denies alcohol or illicit drug use.   Current partner is father of baby.  Denies domestic abuse.     Family History:       Noncontributory without genetic disease, birth defects, or syndromes.    Review of Systems   Constitutional: Negative.    HENT: Negative.    Eyes: Negative.    Respiratory: Negative.    Cardiovascular: Negative.    Gastrointestinal: Negative.    Endocrine: Negative.    Genitourinary: Negative.    Musculoskeletal: Positive for back pain.   Integumentary:  Negative.   Neurological: Negative.    Hematological: Negative.    Psychiatric/Behavioral: Negative.    Breast: negative.       Vitals:    Temp:  [97.9 °F (36.6 °C)] 97.9 °F (36.6 °C)  Pulse:  [87] 87  Resp:  [16] 16  SpO2:  [100 %] 100 %  BP: (114-123)/(56) 114/56    BP (!) 114/56   Pulse 87   Temp 97.9 °F (36.6 °C) (Oral)   Resp 16   Ht 5' 4" (1.626 m)   Wt 95.6 kg (210 lb 12.2 oz)   LMP 2019 (Approximate)   SpO2 100%   Breastfeeding? No   BMI 36.18 kg/m²     Physical Exam:   Constitutional: She appears well-developed. No distress.                             Alert and oriented to person, place and time.  HEENT:  Normocephalic, atraumatic, anicteric, moist mucus membranes.  Neck supple without masses.  LUNGS:  Clear to auscultation bilaterally.  HEART:  Regular rate & rhythm with physiologic heart sounds.  ABDOMEN:  Soft, non tender without any " guarding, rigidity or rebound. Normoactive bowel sounds.  PELVIC:  Normal female external genitalia without gross lesions, rashes or excoriations. o gross vaginal or cervical lesions.  Cervix closed, thick, high, posterior.  EXTREMITIES:  Symmetric without cramping, claudication. Trace edema LE. +2 distal pulses.  Full range of motion.  SKIN:  No rashes, good turgor & capillary refill.  NEUROLOGIC:  Grossly intact bilaterally. +2 DTR, symmetric.  PSYCH:  Mood & affect appropriate.       Laboratory:     Recent Labs   Lab 10/22/19  0944   WBC 6.22   RBC 3.91*   HGB 10.4*   HCT 32.6*      MCV 83   MCH 26.6*   MCHC 31.9*     Fetal Heart Rate Monitoring (NST):     Category: 1  Tocometry: irregular ctx    Assessment:     1. 25 y.o.  at 39w2d for repeat  section due to trial of labor after  (TOLAC) to avoid the risk of uterine rupture.  2. Anemia, iron deficiency      Plan:     Admit to L&D for repeat .    The patient was informed of the risks, benefits, alternatives and possible complications to  section and blood transfusion including pre-admission, admission, and post-admission procedures and expectations.  Risks of  section included, but were not limited to, death, urinary and/or fecal incontinence, injury to bladder and/or urinary tract, injury to bowel and/or intestinal obstruction, risk of infection, damage to major blood vessels, hemorrhage requiring transfusion of blood products and/or hysterectomy, painful intercourse, ovarian failure requiring hormone administration, pulmonary embolism, fistula formation, sexual dysfunction, failure of wound to heal, hernia, permanent and disfiguring scarring.  In the event of a hysterectomy +/- bilateral salpingo-oophorectomy (BS&O) if uterine/ovarian injury or uncontrollable hemorrhage were to occur, the patient was counseled extensively on the inability to become pregnant following a hysterectomy and in the event of a  BS&O will result in surgical menopause.  The patient expressed understanding of the risks involved.  All questions were answered and informed consent was obtained for  delivery, blood transfusion, and all indicated procedures.      Fe supplementation      Andrew Barrera MD       160.02

## 2020-03-07 ENCOUNTER — EMERGENCY (EMERGENCY)
Facility: HOSPITAL | Age: 35
LOS: 1 days | Discharge: HOME | End: 2020-03-07
Attending: INTERNAL MEDICINE | Admitting: EMERGENCY MEDICINE
Payer: COMMERCIAL

## 2020-03-07 VITALS
DIASTOLIC BLOOD PRESSURE: 99 MMHG | HEART RATE: 115 BPM | RESPIRATION RATE: 18 BRPM | SYSTOLIC BLOOD PRESSURE: 135 MMHG | WEIGHT: 179.9 LBS | OXYGEN SATURATION: 96 % | HEIGHT: 63 IN

## 2020-03-07 VITALS
RESPIRATION RATE: 17 BRPM | HEART RATE: 106 BPM | DIASTOLIC BLOOD PRESSURE: 79 MMHG | SYSTOLIC BLOOD PRESSURE: 118 MMHG | OXYGEN SATURATION: 97 % | TEMPERATURE: 99 F

## 2020-03-07 DIAGNOSIS — Z90.49 ACQUIRED ABSENCE OF OTHER SPECIFIED PARTS OF DIGESTIVE TRACT: Chronic | ICD-10-CM

## 2020-03-07 DIAGNOSIS — Z90.13 ACQUIRED ABSENCE OF BILATERAL BREASTS AND NIPPLES: Chronic | ICD-10-CM

## 2020-03-07 LAB
ALBUMIN SERPL ELPH-MCNC: 3.9 G/DL — SIGNIFICANT CHANGE UP (ref 3.3–5)
ALP SERPL-CCNC: 58 U/L — SIGNIFICANT CHANGE UP (ref 40–120)
ALT FLD-CCNC: 23 U/L — SIGNIFICANT CHANGE UP (ref 12–78)
AMYLASE P1 CFR SERPL: 47 U/L — SIGNIFICANT CHANGE UP (ref 25–125)
ANION GAP SERPL CALC-SCNC: 8 MMOL/L — SIGNIFICANT CHANGE UP (ref 5–17)
AST SERPL-CCNC: 37 U/L — SIGNIFICANT CHANGE UP (ref 15–37)
BILIRUB SERPL-MCNC: 0.4 MG/DL — SIGNIFICANT CHANGE UP (ref 0.2–1.2)
BUN SERPL-MCNC: 18 MG/DL — SIGNIFICANT CHANGE UP (ref 7–23)
CALCIUM SERPL-MCNC: 8.7 MG/DL — SIGNIFICANT CHANGE UP (ref 8.5–10.1)
CHLORIDE SERPL-SCNC: 106 MMOL/L — SIGNIFICANT CHANGE UP (ref 96–108)
CO2 SERPL-SCNC: 23 MMOL/L — SIGNIFICANT CHANGE UP (ref 22–31)
CREAT SERPL-MCNC: 0.7 MG/DL — SIGNIFICANT CHANGE UP (ref 0.5–1.3)
GLUCOSE SERPL-MCNC: 101 MG/DL — HIGH (ref 70–99)
HCG SERPL-ACNC: <1 MIU/ML — SIGNIFICANT CHANGE UP
HCT VFR BLD CALC: 36.6 % — SIGNIFICANT CHANGE UP (ref 34.5–45)
HGB BLD-MCNC: 11.9 G/DL — SIGNIFICANT CHANGE UP (ref 11.5–15.5)
LIDOCAIN IGE QN: 138 U/L — SIGNIFICANT CHANGE UP (ref 73–393)
MCHC RBC-ENTMCNC: 27.8 PG — SIGNIFICANT CHANGE UP (ref 27–34)
MCHC RBC-ENTMCNC: 32.5 GM/DL — SIGNIFICANT CHANGE UP (ref 32–36)
MCV RBC AUTO: 85.5 FL — SIGNIFICANT CHANGE UP (ref 80–100)
NRBC # BLD: 0 /100 WBCS — SIGNIFICANT CHANGE UP (ref 0–0)
PLATELET # BLD AUTO: 438 K/UL — HIGH (ref 150–400)
POTASSIUM SERPL-MCNC: 4.9 MMOL/L — SIGNIFICANT CHANGE UP (ref 3.5–5.3)
POTASSIUM SERPL-SCNC: 4.9 MMOL/L — SIGNIFICANT CHANGE UP (ref 3.5–5.3)
PROT SERPL-MCNC: 7.9 G/DL — SIGNIFICANT CHANGE UP (ref 6–8.3)
RBC # BLD: 4.28 M/UL — SIGNIFICANT CHANGE UP (ref 3.8–5.2)
RBC # FLD: 12.9 % — SIGNIFICANT CHANGE UP (ref 10.3–14.5)
SODIUM SERPL-SCNC: 137 MMOL/L — SIGNIFICANT CHANGE UP (ref 135–145)
WBC # BLD: 15.99 K/UL — HIGH (ref 3.8–10.5)
WBC # FLD AUTO: 15.99 K/UL — HIGH (ref 3.8–10.5)

## 2020-03-07 PROCEDURE — 82150 ASSAY OF AMYLASE: CPT

## 2020-03-07 PROCEDURE — 84702 CHORIONIC GONADOTROPIN TEST: CPT

## 2020-03-07 PROCEDURE — 80053 COMPREHEN METABOLIC PANEL: CPT

## 2020-03-07 PROCEDURE — 99284 EMERGENCY DEPT VISIT MOD MDM: CPT

## 2020-03-07 PROCEDURE — 93005 ELECTROCARDIOGRAM TRACING: CPT

## 2020-03-07 PROCEDURE — 96374 THER/PROPH/DIAG INJ IV PUSH: CPT

## 2020-03-07 PROCEDURE — 93010 ELECTROCARDIOGRAM REPORT: CPT

## 2020-03-07 PROCEDURE — 96375 TX/PRO/DX INJ NEW DRUG ADDON: CPT

## 2020-03-07 PROCEDURE — 36415 COLL VENOUS BLD VENIPUNCTURE: CPT

## 2020-03-07 PROCEDURE — 85027 COMPLETE CBC AUTOMATED: CPT

## 2020-03-07 PROCEDURE — 99284 EMERGENCY DEPT VISIT MOD MDM: CPT | Mod: 25

## 2020-03-07 PROCEDURE — 83690 ASSAY OF LIPASE: CPT

## 2020-03-07 PROCEDURE — 96376 TX/PRO/DX INJ SAME DRUG ADON: CPT

## 2020-03-07 RX ORDER — ONDANSETRON 8 MG/1
4 TABLET, FILM COATED ORAL ONCE
Refills: 0 | Status: COMPLETED | OUTPATIENT
Start: 2020-03-07 | End: 2020-03-07

## 2020-03-07 RX ORDER — SODIUM CHLORIDE 9 MG/ML
1000 INJECTION INTRAMUSCULAR; INTRAVENOUS; SUBCUTANEOUS ONCE
Refills: 0 | Status: COMPLETED | OUTPATIENT
Start: 2020-03-07 | End: 2020-03-07

## 2020-03-07 RX ORDER — ONDANSETRON 8 MG/1
4 TABLET, FILM COATED ORAL ONCE
Refills: 0 | Status: DISCONTINUED | OUTPATIENT
Start: 2020-03-07 | End: 2020-03-07

## 2020-03-07 RX ORDER — PANTOPRAZOLE SODIUM 20 MG/1
40 TABLET, DELAYED RELEASE ORAL ONCE
Refills: 0 | Status: COMPLETED | OUTPATIENT
Start: 2020-03-07 | End: 2020-03-07

## 2020-03-07 RX ADMIN — SODIUM CHLORIDE 1000 MILLILITER(S): 9 INJECTION INTRAMUSCULAR; INTRAVENOUS; SUBCUTANEOUS at 07:49

## 2020-03-07 RX ADMIN — ONDANSETRON 4 MILLIGRAM(S): 8 TABLET, FILM COATED ORAL at 09:09

## 2020-03-07 RX ADMIN — PANTOPRAZOLE SODIUM 40 MILLIGRAM(S): 20 TABLET, DELAYED RELEASE ORAL at 07:50

## 2020-03-07 RX ADMIN — SODIUM CHLORIDE 1000 MILLILITER(S): 9 INJECTION INTRAMUSCULAR; INTRAVENOUS; SUBCUTANEOUS at 09:09

## 2020-03-07 RX ADMIN — ONDANSETRON 4 MILLIGRAM(S): 8 TABLET, FILM COATED ORAL at 07:50

## 2020-03-07 RX ADMIN — Medication 1 MILLIGRAM(S): at 07:49

## 2020-03-07 NOTE — ED PROVIDER NOTE - CARE PROVIDER_API CALL
Hayden Samuel ()  Internal Medicine  [address] [place] [address]  Phone: [phone]  Fax: [phone]  Follow Up Time:

## 2020-03-07 NOTE — ED PROVIDER NOTE - PATIENT PORTAL LINK FT
You can access the FollowMyHealth Patient Portal offered by [facility] by registering at the following website: [email or web address]. By joining [employer]’s FollowMyHealth portal, you will also be able to view your health information using other applications (apps) compatible with our system.

## 2020-03-07 NOTE — ED ADULT NURSE REASSESSMENT NOTE - NS ED NURSE REASSESS COMMENT FT1
4 RNs attempted for IV access. Unable to obtain IV access at this time. SHAI Harmit aware. 4 RNs attempted for IV access. Unable to obtain IV access at this time. SHAI Driver aware. MD Navarro aware.

## 2020-03-07 NOTE — ED ADULT NURSE NOTE - OBJECTIVE STATEMENT
33 y/o F patient presents to ED from home c/o abdominal spasms and nausea/vomiting. Patient reports abdominal 35 y/o F patient PMH bile reflux, gastroparesis, anxiety, presents to ED from home c/o abdominal spasms and nausea/vomiting since last night. Patient reports abdominal spasms and abdominal pain began after eating chocolate cake and pickles after dinner. Patient reports nausea and multiple episodes of vomiting. Patient reports she took zofran and tylenol last night with mild relief. Patient A&Ox4. lungs CTA. skin warm and intact. abdomen tender to palpation. Patient denies HA, dizziness, SOB, chest pain, bowel/bladder changes, fevers/chills. Safety and comfort measures provided and maintained. Family bedside.

## 2020-03-07 NOTE — ED PROVIDER NOTE - CLINICAL SUMMARY MEDICAL DECISION MAKING FREE TEXT BOX
acute abdominal pain with N/V h/o GERD and gastroparesis, dehydration plan IVF labs Zofran Protonix Ativan

## 2020-03-07 NOTE — ED PROVIDER NOTE - OBJECTIVE STATEMENT
abdominal spasms, vomiting  hx of bile reflux, gastroparesis  abdominal pain, vomiting 35 y/o WF h/o idiopathic gastroparesis, GERD C/O abdominal spasms, vomiting x 1 day , no fever, no chills, no urinary symptoms, no GIB. She was taking oral Zofran at home with out relief 35 y/o WF h/o idiopathic gastroparesis, GERD C/O abdominal spasms, vomiting x 1 day also c/o acid reflux... no fever, no chills, no urinary symptoms, no GYN symptoms, no GIB. She was taking oral Zofran at home with out relief

## 2020-03-07 NOTE — ED PROVIDER NOTE - NSFOLLOWUPINSTRUCTIONS_ED_ALL_ED_FT
stay well hydrated  continue your usual meds  follow up with your doctors and gi specialist if you have no gi specialist follow up with dr Samuel  Clear liquid diet advance slowly as tolerated to  Bananas Rice Tea and Toast (with margarine or jam)  then advance to Baked and boiled (eggs, pasta, chicken)  once tolerated you may advance slowly to regular  Eat small volumes   NO fatty fried foods, no milk or mild products, no tomato, spice, mint, tobacco, alcohol, caffeine  Stay well hydrated: a teaspoon at a time until able to tolerate normal intake.  Prompt follow up with your doctor is essential  if given zofran, use this every 6 hours for nausea.  return for worsening symptoms or any problems/concerns

## 2020-03-16 NOTE — ED ADULT NURSE NOTE - CARDIO WDL
Benadryl 25mg given PO for c/o itching. Xanax 0.5mg given PO for c/o anxiety. Will continue to monitor. Normal rate, regular rhythm, normal S1, S2 heart sounds heard.

## 2020-05-04 NOTE — ED ADULT NURSE NOTE - CAS TRG GEN SKIN COLOR
Anticoagulation clinic spoke with patient this AM regarding recent hospitalization 4/29-5/1. Patient reports she is feeling much better, eating better.  She has stopped orenitram, and started pantoprazole BID (both can cause increase in INR).  Patient resumed her warfarin on 5/1.  Recommend we check INR this week, patient is agreeable, but unable to leave her home without assistance, ACL lab draw ordered for Thursday 5/7.    Patient concerned that her discharge paperwork from hospital says \"Mass or lesion\", and she states no one has discussed this with her. She requests I route message to Dr. Morejon's nurse Haley Almazan to address this question.  Patient has telephone TCM planned tomorrow with Dr. Maldonado.  Patient requests call from Dr. Morejon's office to discuss above, as well as to confirm if they want her to wait until 5/18 to come in.  Please call patient.    
Response from Tha Brown:  I just spoke with Ms. Garcia. From the discharge paperwork, it appears mass/lesion are on the list of DDx for her hematemesis. I also confirmed her appointment for 5/18 - I am leaving it for that date since her Coumadin and cardiac meds are still being adjusted.   
Normal for race

## 2020-07-16 NOTE — BRIEF OPERATIVE NOTE - NSICDXBRIEFPREOP_GEN_ALL_CORE_FT
Readmit for anemia and elev INR --> S/P @ PRBC x 2 Units for H/H 5.1/17  +Fecal occult stool with no active GI bleeding  EGD & colonoscopy 7/13 NEG  H&H stable 8/25, Patient asymptomatic, Hemodynamically stable   GI consulted and following  7/15 Capsule study - normal study no contraindication to anticoagulation PRE-OP DIAGNOSIS:  Left ureteral calculus 29-May-2019 12:35:48  James Dorsey

## 2020-08-28 NOTE — ED PROVIDER NOTE - NS ED MD DISPO DISCHARGE CCDA
PROBLEM DIAGNOSES  Problem: Diverticulitis large intestine w/o perforation or abscess w/o bleeding  Assessment and Plan: pt is having a colon resection with Dr Bryan on 09/03/20     Problem: HTN (hypertension)  Assessment and Plan: pt will continue medication ,pt is going for medical clearance     Problem: Need for prophylactic measure  Assessment and Plan: caprini score is 5 moderate VTE risk SCDF's ordered surgical team to asses the need for pharm proph
Patient/Caregiver provided printed discharge information.
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
## 2021-01-22 NOTE — BEHAVIORAL HEALTH ASSESSMENT NOTE - HPI (INCLUDE ILLNESS QUALITY, SEVERITY, DURATION, TIMING, CONTEXT, MODIFYING FACTORS, ASSOCIATED SIGNS AND SYMPTOMS)
States L lower back pain radiating down L leg x2 days.   
33 yo F PMHx of anxiety ( on klonopin and lexapro), cholelithiasis s/p cholecystectomy, hx of nephrolithiasis, and GERD presents with "on and off" watery nonbloody diarrhea for the past few weeks prior to presentation, associated with epigastric abdominal pain and nausea.     Patient complained of watery nonbloody diarrhea and noted associated alternating pain, sometimes decreased after defecation. Patient described pain and epigastric, band like, not made better or worse by anything, sudden onset beginning a few weeks ago. Patient also complained of nausea, without vomiting.     Patient reports history of anxiety for 14 years, treated on an outpatient basis with Lexapro and Klonopin. Patient admits being stressed out by the work requirements and recent change of living arrangements - she moved from her parents house to living alone. She admits to feeling on the edge, increased muscle tension and occasional panic attacks.

## 2021-04-15 NOTE — ED PROVIDER NOTE - SECONDARY DIAGNOSIS.
----- Message from Milan Bullard MD sent at 4/15/2021  3:07 PM CDT -----  Ferrous sulfate 325 mg p.o. daily #60 with 2 refills.  Stop and call if intolerant.  Order CBC with differential, ferritin and iron panel in 4 weeks.    
Left message for Ciro to call the office for lab results.  
Received return call from Ciro.  Discussed with him that his iron level is low and that Dr. Bullard wants him to start an iron supplement daily and to recheck his labs in 4 weeks.  Scheduled for 5/18.21 at 11.  
Nausea & vomiting

## 2021-05-15 NOTE — BRIEF OPERATIVE NOTE - NSICDXBRIEFPOSTOP_GEN_ALL_CORE_FT
POST-OP DIAGNOSIS:  Hydronephrosis, left 29-May-2019 12:36:17  James Dorsey  Left ureteral calculus 29-May-2019 12:36:01  James Dorsey
Initial (On Arrival)

## 2021-08-10 NOTE — PATIENT PROFILE ADULT - NSPROPTRIGHTBILLOFRIGHTS_GEN_A_NUR
Pt to ER with c/o right arm and left ankle pain after falling down some brick stairs earlier today, pt was seen at an urgent care but they didn't have an xray tech to do an xray so they just splinted her arm, pt states pain is 10/10, pt a&ox4, resp even and unlabored
patient

## 2021-10-10 NOTE — ED ADULT NURSE NOTE - CHIEF COMPLAINT QUOTE
No
complains of upper abdominal pain, nausea/vomiting/diarrhea. complains of chills without fever. history of IBS

## 2022-01-13 NOTE — PROGRESS NOTE ADULT - SUBJECTIVE AND OBJECTIVE BOX
[facility], Division of Infectious Diseases  SERVANDO Lamb A. Lee  [phone]  Name: STEPHANIE MORENO  Age: 32y  Gender: Female  MRN: [id number]    Interval History--  Notes reviewed  pt had colonoscopy prep over night.  nothing new.    Past Medical History--  IBS (irritable bowel syndrome)  Anxiety  Cholelithiasis  S/P cholecystectomy  History of bilateral breast reduction surgery  S/P sinus surgery  S/P rhinoplasty      For details regarding the patient's social history, family history, and other miscellaneous elements, please refer the initial infectious diseases consultation and/or the admitting history and physical examination for this admission.    Allergies    adhesives (Rash)  No Known Drug Allergies    Intolerances        Medications--  Antibiotics:    Immunologic:    Other:  clonazePAM Tablet PRN  dicyclomine PRN  escitalopram  ondansetron Injectable PRN  pantoprazole  Injectable  sodium chloride 0.9%.  sucralfate suspension      Review of Systems--  A 10-point review of systems was obtained.     Pertinent positives and negatives--  Constitutional: No fevers. No Chills. No Rigors.   Cardiovascular: No chest pain. No palpitations.  Respiratory: No shortness of breath. No cough.  Gastrointestinal: No nausea or vomiting. No diarrhea or constipation.   Psychiatric: + anxiety    Review of systems otherwise negative except as previously noted.    Physical Examination--  Vital Signs: T(F): 99.2 (07-26-18 @ 08:29), Max: 99.2 (07-26-18 @ 08:29)  HR: 78 (07-26-18 @ 08:29)  BP: 117/78 (07-26-18 @ 08:29)  RR: 16 (07-26-18 @ 05:00)  SpO2: 98% (07-26-18 @ 08:29)  Wt(kg): --  General: Nontoxic-appearing Female in no acute distress.  HEENT: AT/NC. PERRL. EOMI. Anicteric.   Nodes: None palpable.  Lungs: Clear bilaterally without rales, wheezing or rhonchi  Heart: Regular rate and rhythm. No Murmur. No rub. No gallop. No palpable thrill.  Abdomen: Bowel sounds present and normoactive. Soft. Nondistended. mildly tender  Extremities: No cyanosis or clubbing. No edema.   Skin: Warm. Dry. Good turgor. No rash. No vasculitic stigmata.  Psychiatric: Appropriate affect and mood for situation.         Laboratory Studies--  CBC                        10.5   6.33  )-----------( 257      ( 25 Jul 2018 05:52 )             31.4       Chemistries  07-25    142  |  109<H>  |  6<L>  ----------------------------<  104<H>  3.4<L>   |  26  |  0.64    Ca    7.9<L>      25 Jul 2018 05:52    TPro  6.3  /  Alb  3.5  /  TBili  0.4  /  DBili  x   /  AST  292<H>  /  ALT  173<H>  /  AlkPhos  74  07-25      Culture Data    Culture - Urine (collected 25 Jul 2018 11:49)  Source: .Urine Clean Catch (Midstream)  Preliminary Report (26 Jul 2018 09:35):    50,000 - 99,000 CFU/mL Enterococcus faecalis            < from: CT Abdomen and Pelvis w/ Oral Cont and w/ IV Cont (07.25.18 @ 00:59) >    EXAM:  CT ABDOMEN AND PELVIS OC IC                            PROCEDURE DATE:  07/25/2018          INTERPRETATION:  CT ABDOMEN AND PELVIS WITH CONTRAST    INDICATION: Abdominal pain.    TECHNIQUE: Contrast enhanced CT of the abdomen and pelvis.     90 mL of Omnipaque 350 contrast material was injected IV.    COMPARISON: 6/23/2018.    FINDINGS:    Lower Thorax: No consolidation or effusion.        Liver: No suspicious lesions.      Biliary: No dilatation. Cholecystectomy.  Spleen: No suspicious lesions.      Pancreas: No inflammatory changes or ductal dilatation.      Adrenals: Normal.      Kidneys: No hydronephrosis or solid mass. Punctate nonobstructing left   intrarenal calculus.  Vessels: Normal caliber.        GI tract: No evidence of small bowel obstruction. Mild gastric wall   thickening is seen. Peritoneum/retroperitoneum and mesentery: No free   air. No organized fluid collection. No adenopathy.        Pelvic organs/Bladder: No pelvic masses. Bladder is normal.        Abdominal wall: Unremarkable.  Bones and soft tissues: No destructive lesion.        IMPRESSION:    Mild gastric wall thickening, may reflect gastritis in the appropriate   clinical setting. Similarly there is mild descending colonic wall   thickening, which may be due to underdistention or mild inflammation.   Clinical correlation with patient's symptoms is recommended. Endoscopy   can be considered as warranted.        Sedimentation Rate, Erythrocyte (07.26.18 @ 08:03)    Sedimentation Rate, Erythrocyte: 10 mm/hr Render In Strict Bullet Format?: No Discontinue Regimen: Doxycycline Detail Level: Zone Initiate Treatment: Winlevi BID\\nBenzoyl peroxide wash\\n\\nAM: Cervae wash, winlevi, moisturizer \\nPM: panoxyl, winlevi, tretinoin, moisturizer Continue Regimen: Tretinoin 0.04% QHS

## 2022-03-01 ENCOUNTER — TRANSCRIPTION ENCOUNTER (OUTPATIENT)
Age: 37
End: 2022-03-01

## 2022-03-09 NOTE — ED ADULT NURSE NOTE - PSH
History of bilateral breast reduction surgery    S/P cholecystectomy    S/P rhinoplasty    S/P sinus surgery
Back pain

## 2022-04-02 NOTE — ED ADULT TRIAGE NOTE - PRO INTERPRETER NEED 2
[address] [facility]  Phone [phone] Fax [phone]     Patient Name: Génesis Barrientos 3/25/2022  : 1958  Age: 70 y.o.   Patient Address: [address] [place] [address]       Patient Phone: [phone] (home)     REFERRAL  Referred to: DME provider of patient's choice  Génesis Barrientos is referred for the following:    DME Equipment HPCPS Code Setting   Auto Adjusting CPAP device with flex or comparable pressure relief per comfort  10cm to 20cm   Heated Humidifier  Patient Choice       Replinishible PAP Supplies, 1 year supply  Item HPCPS Code Frequency   Mask of choice  or  1 per 3 months   Nasal Mask cushion/pillows  or  2 per 30 days   Full Face Mask Interface  1 per 30 days   Headgear  1 per 6 months   Tubing, length of choice  or  1 per 3 months   Water Chamber  1 per 6 months   Chinstrap  1 per 6 months   Disposable Filters  2 per 30 days   Reusable Filters  1 per 6 months     Diagnoses:  Obstructive sleep apnea (G47.33)  Length of Need: Lifetime, 99    Ordering Provider: Benito Holstein, M.D. Rosi Crisp: [id number]         Signature:       Date: 3/25/2022      Electronically Signed by Benito Holstein, M.D.
English

## 2022-04-18 NOTE — ED PROVIDER NOTE - AREA
"Social Work Progress Note      Data/Intervention:  Patient Name:  Mickie Mcghee DOB/Age:  1968 (54 year old)    Reason for Follow-Up:  Mary is a 54-year-old woman with a diagnosis of metastatic breast cancer who is followed by Dr. Lozada at [facility].     Intervention:   Home Care Support:   This clinician received call from [facility] (Ph: [phone] Fax: [phone]) that they would likely be able to support pt with PT/OT/RN support in home with an admission date as of this weekend.     SW routed to RNCC Susan Rajput for assistance in faxing the following: facesheet, med list, last 2 visit notes, full dx list    Safety of Home:   Mary reports that she is working closely with sister and  to make sure that she has a safe transition of care when sister leaves [place]. Mary reports that she is able to transfer to bed/commode/wheelchair with assistance of family using belt and that  is \"getting the hang of it.\" This clinician inquired if Mary would be receptive to LTC bed placement while sister in [place], Mary reported that she didn't think that would be needed. Mary receptive to this clinician helping her to establish with [facility].     Plan:  1) Appreciate ROBSON aDvis's referral for Home Care Services  2) Onc SW to connect with Annabella Del Rosario about connecting with [facility].   3) Onc SW will continue to be available as needed for ongoing psychosocial support.     Please call or page if needs or concerns arise.     BENJAMIN Lainez, [facility] Center[facility]  Direct Phone: [phone]  Pager: [phone]    "
upper

## 2022-04-20 ENCOUNTER — APPOINTMENT (OUTPATIENT)
Dept: SURGERY | Facility: CLINIC | Age: 37
End: 2022-04-20
Payer: COMMERCIAL

## 2022-04-20 VITALS
OXYGEN SATURATION: 99 % | BODY MASS INDEX: 37.21 KG/M2 | RESPIRATION RATE: 17 BRPM | TEMPERATURE: 97.7 F | DIASTOLIC BLOOD PRESSURE: 99 MMHG | WEIGHT: 210 LBS | SYSTOLIC BLOOD PRESSURE: 149 MMHG | HEIGHT: 63 IN | HEART RATE: 106 BPM

## 2022-04-20 VITALS — SYSTOLIC BLOOD PRESSURE: 131 MMHG | DIASTOLIC BLOOD PRESSURE: 89 MMHG

## 2022-04-20 DIAGNOSIS — K60.2 ANAL FISSURE, UNSPECIFIED: ICD-10-CM

## 2022-04-20 PROCEDURE — 99204 OFFICE O/P NEW MOD 45 MIN: CPT | Mod: 25

## 2022-04-20 PROCEDURE — 46600 DIAGNOSTIC ANOSCOPY SPX: CPT

## 2022-04-20 RX ORDER — DIPHENOXYLATE HYDROCHLORIDE AND ATROPINE SULFATE 2.5; .025 MG/1; MG/1
2.5-0.025 TABLET ORAL
Refills: 0 | Status: DISCONTINUED | COMMUNITY
End: 2022-04-20

## 2022-04-20 RX ORDER — SUCRALFATE 1 G/10ML
1 SUSPENSION ORAL
Qty: 100 | Refills: 0 | Status: DISCONTINUED | COMMUNITY
Start: 2018-07-03 | End: 2022-04-20

## 2022-04-20 RX ORDER — PANTOPRAZOLE 40 MG/1
40 TABLET, DELAYED RELEASE ORAL DAILY
Qty: 30 | Refills: 3 | Status: DISCONTINUED | COMMUNITY
Start: 2018-07-13 | End: 2022-04-20

## 2022-04-20 RX ORDER — SUCRALFATE 1 G/10ML
1 SUSPENSION ORAL 4 TIMES DAILY
Qty: 1200 | Refills: 5 | Status: DISCONTINUED | COMMUNITY
Start: 2018-07-19 | End: 2022-04-20

## 2022-04-20 RX ORDER — PROCHLORPERAZINE 25 MG/1
25 SUPPOSITORY RECTAL
Qty: 60 | Refills: 3 | Status: DISCONTINUED | COMMUNITY
Start: 2018-07-13 | End: 2022-04-20

## 2022-04-20 RX ORDER — DICYCLOMINE HYDROCHLORIDE 10 MG/1
10 CAPSULE ORAL
Refills: 0 | Status: DISCONTINUED | COMMUNITY
End: 2022-04-20

## 2022-04-20 RX ORDER — COLESTIPOL HYDROCHLORIDE 1 G/1
1 TABLET, FILM COATED ORAL DAILY
Qty: 30 | Refills: 0 | Status: DISCONTINUED | COMMUNITY
Start: 2018-07-19 | End: 2022-04-20

## 2022-04-20 RX ORDER — RANITIDINE HYDROCHLORIDE 150 MG/1
150 TABLET, FILM COATED ORAL
Refills: 0 | Status: DISCONTINUED | COMMUNITY
End: 2022-04-20

## 2022-04-20 RX ORDER — ONABOTULINUMTOXINA 100 [USP'U]/1
100 INJECTION, POWDER, LYOPHILIZED, FOR SOLUTION INTRADERMAL; INTRAMUSCULAR
Qty: 1 | Refills: 0 | Status: ACTIVE | COMMUNITY
Start: 2022-04-20 | End: 1900-01-01

## 2022-04-20 NOTE — PHYSICAL EXAM
[FreeTextEntry1] : This is a 36 year-old well-developed female in no apparent distress.\par \par HEENT normocephalic, anicteric, external ears normal bilaterally, EOMs intact.\par \par Cardiac - regular rate and rhythm.\par \par Abdomen soft, nontender, nondistended, no masses. No hepatosplenomegaly.\par \par No inguinal lymphadenopathy bilaterally.\par \par Examination of the perineum reveals anterior midline sentinel skin tag and fissure and posterior midline fissure. Digital rectal examination reveals sphincter spasm. \par Anoscopy reveals small, non-inflamed internal hemorrhoids. \par \par Neuro-cranial nerves grossly intact. Normal gait.\par \par Psychiatric-oriented to time place and person. Good understanding of conversation.\par \par \par

## 2022-04-20 NOTE — HISTORY OF PRESENT ILLNESS
[FreeTextEntry1] : Carla is a 35 y/o female here for consultation for anal fissure. Patient has had fissure for about 6 months now. In the past she used Rectiv which helped for a short time. Currently on nifedipine which is not helping. Reports having pain at all times, worse after BMs. Has irregular BM at times normal soft BM other times diarrhea and at times large hard BM. History of reflux and is always nauseous. No fever or chills. \par Denies taking anticoagulant.  Reports history of gastroparesis and pelvic floor dysfunction.  Did biofeedback in 2019 which helped some. Occasional BRBPR.\par No family hx of colon cancer.\par Mother hx of Crohn's disease. \par Grandmother on father side ileitis and had ostomy. \par Colonoscopy 7/26/18 by Dr. Litzy Abdalla- mild internal hemorrhoids, otherwise normal colonoscopy from rectum to terminal ileum. \par

## 2022-04-20 NOTE — CONSULT LETTER
[Dear  ___] : Dear ~RAYSHAWN, [Consult Letter:] : I had the pleasure of evaluating your patient, [unfilled]. [Please see my note below.] : Please see my note below. [Consult Closing:] : Thank you very much for allowing me to participate in the care of this patient.  If you have any questions, please do not hesitate to contact me. [Sincerely,] : Sincerely, [FreeTextEntry2] : Dr. Americo Waite [FreeTextEntry3] : Florencia Miller M.D., F.A.C.S., F.MARCIANO.S.C.R.S.\par Assistant Professor of Surgery\par [employer] [facility]\par \par

## 2022-04-20 NOTE — ASSESSMENT
[FreeTextEntry1] : 37 yo female with two anal fissures. I recommended Botox injection in the office and discussed the details of the procedure risks benefits and expectations of recovery.  The small sentinel skin tag is not bothering her so we will not plan for excision of this at this time.\par My office will order the Botox.  RTO for the Botox injection.\par \par

## 2022-05-03 NOTE — ED PROVIDER NOTE - OBJECTIVE STATEMENT
32 yo female hx of anxiety, cholecystectomy, IBS, c/o left flank pain this morning, radiating to abdomen with associated nausea, 10/10 pain, no vomiting.  No diarrhea, no fever/chills. No bruits; no thyromegaly or nodules

## 2022-10-27 NOTE — ED ADULT NURSE NOTE - OBJECTIVE STATEMENT
23-Oct-2022 Pt tripped and landed weird on her right foot while carrying her cat. Pt c/o left big toe pain.

## 2023-01-17 ENCOUNTER — RX ONLY (RX ONLY)
Age: 38
End: 2023-01-17

## 2023-01-17 ENCOUNTER — OFFICE (OUTPATIENT)
Dept: URBAN - METROPOLITAN AREA CLINIC 109 | Facility: CLINIC | Age: 38
Setting detail: OPHTHALMOLOGY
End: 2023-01-17
Payer: COMMERCIAL

## 2023-01-17 DIAGNOSIS — H10.233: ICD-10-CM

## 2023-01-17 PROCEDURE — 92012 INTRM OPH EXAM EST PATIENT: CPT | Performed by: OPHTHALMOLOGY

## 2023-01-17 RX ORDER — TOBRAMYCIN AND DEXAMETHASONE 3; 1 MG/ML; MG/ML
SUSPENSION/ DROPS OPHTHALMIC
Qty: 1 | Refills: 0 | Status: ACTIVE | OUTPATIENT

## 2023-01-17 ASSESSMENT — SUPERFICIAL PUNCTATE KERATITIS (SPK)
OD_SPK: 1+
OS_SPK: 1+

## 2023-01-17 ASSESSMENT — VISUAL ACUITY
OS_BCVA: 20/20-1
OD_BCVA: 20/20-2

## 2023-01-23 ENCOUNTER — APPOINTMENT (OUTPATIENT)
Dept: PEDIATRIC ALLERGY IMMUNOLOGY | Facility: CLINIC | Age: 38
End: 2023-01-23

## 2023-01-31 ENCOUNTER — OFFICE (OUTPATIENT)
Dept: URBAN - METROPOLITAN AREA CLINIC 109 | Facility: CLINIC | Age: 38
Setting detail: OPHTHALMOLOGY
End: 2023-01-31
Payer: COMMERCIAL

## 2023-01-31 DIAGNOSIS — H16.223: ICD-10-CM

## 2023-01-31 PROBLEM — H10.233 CONJUNCTIVITIS ACUTE NON VIRAL SEROUS; BOTH EYES: Status: RESOLVED | Noted: 2023-01-17 | Resolved: 2023-01-31

## 2023-01-31 PROCEDURE — 92014 COMPRE OPH EXAM EST PT 1/>: CPT | Performed by: OPHTHALMOLOGY

## 2023-01-31 ASSESSMENT — REFRACTION_CURRENTRX
OS_OVR_VA: 20/
OD_OVR_VA: 20/
OD_SPHERE: -2.00
OS_SPHERE: -2.00

## 2023-01-31 ASSESSMENT — TONOMETRY
OS_IOP_MMHG: 19
OD_IOP_MMHG: 18

## 2023-01-31 ASSESSMENT — REFRACTION_MANIFEST
OD_SPHERE: -2.00
OS_SPHERE: -2.00

## 2023-08-02 ENCOUNTER — NON-APPOINTMENT (OUTPATIENT)
Age: 38
End: 2023-08-02

## 2023-12-14 NOTE — ED ADULT NURSE NOTE - NS ED NURSE RECORD ANOTHER VITAL SIGN
[FreeTextEntry1] : Pt's therapist Ray Campbell called writer, writer called back #[phone]: Said that pt remains anxious. 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 Ray does not have safety concerns, though said that pt has expressed thoughts of death. Pt seeks a lot of reassurance in obsessive manner. Pt is intelligent. Working on changing thought patterns. Discussed how increasing SSRI dose can further target intrusive thoughts. 
Yes

## 2024-02-19 ENCOUNTER — INPATIENT (INPATIENT)
Facility: HOSPITAL | Age: 39
LOS: 0 days | Discharge: HOME | DRG: 392 | End: 2024-02-20
Attending: HOSPITALIST | Admitting: HOSPITALIST
Payer: COMMERCIAL

## 2024-02-19 VITALS
RESPIRATION RATE: 18 BRPM | DIASTOLIC BLOOD PRESSURE: 103 MMHG | HEART RATE: 118 BPM | TEMPERATURE: 97 F | OXYGEN SATURATION: 97 % | SYSTOLIC BLOOD PRESSURE: 134 MMHG

## 2024-02-19 DIAGNOSIS — Z90.49 ACQUIRED ABSENCE OF OTHER SPECIFIED PARTS OF DIGESTIVE TRACT: Chronic | ICD-10-CM

## 2024-02-19 DIAGNOSIS — R11.2 NAUSEA WITH VOMITING, UNSPECIFIED: ICD-10-CM

## 2024-02-19 DIAGNOSIS — Z90.13 ACQUIRED ABSENCE OF BILATERAL BREASTS AND NIPPLES: Chronic | ICD-10-CM

## 2024-02-19 LAB
ALBUMIN SERPL ELPH-MCNC: 3.9 G/DL — SIGNIFICANT CHANGE UP (ref 3.3–5)
ALP SERPL-CCNC: 70 U/L — SIGNIFICANT CHANGE UP (ref 40–120)
ALT FLD-CCNC: 51 U/L — SIGNIFICANT CHANGE UP (ref 12–78)
ANION GAP SERPL CALC-SCNC: 10 MMOL/L — SIGNIFICANT CHANGE UP (ref 5–17)
AST SERPL-CCNC: 48 U/L — HIGH (ref 15–37)
BASOPHILS # BLD AUTO: 0.08 K/UL — SIGNIFICANT CHANGE UP (ref 0–0.2)
BASOPHILS NFR BLD AUTO: 0.4 % — SIGNIFICANT CHANGE UP (ref 0–2)
BILIRUB SERPL-MCNC: 0.3 MG/DL — SIGNIFICANT CHANGE UP (ref 0.2–1.2)
BUN SERPL-MCNC: 16 MG/DL — SIGNIFICANT CHANGE UP (ref 7–23)
CALCIUM SERPL-MCNC: 9.1 MG/DL — SIGNIFICANT CHANGE UP (ref 8.5–10.1)
CHLORIDE SERPL-SCNC: 110 MMOL/L — HIGH (ref 96–108)
CO2 SERPL-SCNC: 18 MMOL/L — LOW (ref 22–31)
CREAT SERPL-MCNC: 0.85 MG/DL — SIGNIFICANT CHANGE UP (ref 0.5–1.3)
EGFR: 89 ML/MIN/1.73M2 — SIGNIFICANT CHANGE UP
EOSINOPHIL # BLD AUTO: 0.04 K/UL — SIGNIFICANT CHANGE UP (ref 0–0.5)
EOSINOPHIL NFR BLD AUTO: 0.2 % — SIGNIFICANT CHANGE UP (ref 0–6)
GLUCOSE SERPL-MCNC: 175 MG/DL — HIGH (ref 70–99)
HCG SERPL-ACNC: <1 MIU/ML — SIGNIFICANT CHANGE UP
HCT VFR BLD CALC: 41.3 % — SIGNIFICANT CHANGE UP (ref 34.5–45)
HGB BLD-MCNC: 13.5 G/DL — SIGNIFICANT CHANGE UP (ref 11.5–15.5)
IMM GRANULOCYTES NFR BLD AUTO: 0.4 % — SIGNIFICANT CHANGE UP (ref 0–0.9)
LIDOCAIN IGE QN: 62 U/L — SIGNIFICANT CHANGE UP (ref 13–75)
LYMPHOCYTES # BLD AUTO: 1.21 K/UL — SIGNIFICANT CHANGE UP (ref 1–3.3)
LYMPHOCYTES # BLD AUTO: 6.1 % — LOW (ref 13–44)
MCHC RBC-ENTMCNC: 28 PG — SIGNIFICANT CHANGE UP (ref 27–34)
MCHC RBC-ENTMCNC: 32.7 GM/DL — SIGNIFICANT CHANGE UP (ref 32–36)
MCV RBC AUTO: 85.7 FL — SIGNIFICANT CHANGE UP (ref 80–100)
MONOCYTES # BLD AUTO: 1.39 K/UL — HIGH (ref 0–0.9)
MONOCYTES NFR BLD AUTO: 7 % — SIGNIFICANT CHANGE UP (ref 2–14)
NEUTROPHILS # BLD AUTO: 16.94 K/UL — HIGH (ref 1.8–7.4)
NEUTROPHILS NFR BLD AUTO: 85.9 % — HIGH (ref 43–77)
NRBC # BLD: 0 /100 WBCS — SIGNIFICANT CHANGE UP (ref 0–0)
PLATELET # BLD AUTO: 450 K/UL — HIGH (ref 150–400)
POTASSIUM SERPL-MCNC: 4.4 MMOL/L — SIGNIFICANT CHANGE UP (ref 3.5–5.3)
POTASSIUM SERPL-SCNC: 4.4 MMOL/L — SIGNIFICANT CHANGE UP (ref 3.5–5.3)
PROT SERPL-MCNC: 7.9 G/DL — SIGNIFICANT CHANGE UP (ref 6–8.3)
RAPID RVP RESULT: SIGNIFICANT CHANGE UP
RBC # BLD: 4.82 M/UL — SIGNIFICANT CHANGE UP (ref 3.8–5.2)
RBC # FLD: 13.4 % — SIGNIFICANT CHANGE UP (ref 10.3–14.5)
SARS-COV-2 RNA SPEC QL NAA+PROBE: SIGNIFICANT CHANGE UP
SODIUM SERPL-SCNC: 138 MMOL/L — SIGNIFICANT CHANGE UP (ref 135–145)
WBC # BLD: 19.73 K/UL — HIGH (ref 3.8–10.5)
WBC # FLD AUTO: 19.73 K/UL — HIGH (ref 3.8–10.5)

## 2024-02-19 PROCEDURE — 99285 EMERGENCY DEPT VISIT HI MDM: CPT

## 2024-02-19 PROCEDURE — 74177 CT ABD & PELVIS W/CONTRAST: CPT | Mod: 26,MA

## 2024-02-19 RX ORDER — OMEPRAZOLE 10 MG/1
1 CAPSULE, DELAYED RELEASE ORAL
Qty: 0 | Refills: 0 | DISCHARGE

## 2024-02-19 RX ORDER — PANTOPRAZOLE SODIUM 20 MG/1
40 TABLET, DELAYED RELEASE ORAL
Refills: 0 | Status: DISCONTINUED | OUTPATIENT
Start: 2024-02-19 | End: 2024-02-20

## 2024-02-19 RX ORDER — CLONAZEPAM 1 MG
0.5 TABLET ORAL THREE TIMES A DAY
Refills: 0 | Status: DISCONTINUED | OUTPATIENT
Start: 2024-02-19 | End: 2024-02-20

## 2024-02-19 RX ORDER — SODIUM CHLORIDE 9 MG/ML
1000 INJECTION, SOLUTION INTRAVENOUS
Refills: 0 | Status: DISCONTINUED | OUTPATIENT
Start: 2024-02-19 | End: 2024-02-20

## 2024-02-19 RX ORDER — ESCITALOPRAM OXALATE 10 MG/1
20 TABLET, FILM COATED ORAL DAILY
Refills: 0 | Status: DISCONTINUED | OUTPATIENT
Start: 2024-02-19 | End: 2024-02-20

## 2024-02-19 RX ORDER — PROCHLORPERAZINE MALEATE 5 MG
10 TABLET ORAL ONCE
Refills: 0 | Status: COMPLETED | OUTPATIENT
Start: 2024-02-19 | End: 2024-02-19

## 2024-02-19 RX ORDER — LOPERAMIDE HCL 2 MG
2 TABLET ORAL ONCE
Refills: 0 | Status: COMPLETED | OUTPATIENT
Start: 2024-02-19 | End: 2024-02-19

## 2024-02-19 RX ORDER — ONDANSETRON 8 MG/1
4 TABLET, FILM COATED ORAL ONCE
Refills: 0 | Status: COMPLETED | OUTPATIENT
Start: 2024-02-19 | End: 2024-02-19

## 2024-02-19 RX ORDER — ESCITALOPRAM OXALATE 10 MG/1
1 TABLET, FILM COATED ORAL
Qty: 0 | Refills: 0 | DISCHARGE

## 2024-02-19 RX ORDER — ONDANSETRON 8 MG/1
4 TABLET, FILM COATED ORAL EVERY 6 HOURS
Refills: 0 | Status: DISCONTINUED | OUTPATIENT
Start: 2024-02-19 | End: 2024-02-20

## 2024-02-19 RX ORDER — ACETAMINOPHEN 500 MG
650 TABLET ORAL EVERY 6 HOURS
Refills: 0 | Status: DISCONTINUED | OUTPATIENT
Start: 2024-02-19 | End: 2024-02-20

## 2024-02-19 RX ORDER — FAMOTIDINE 10 MG/ML
20 INJECTION INTRAVENOUS ONCE
Refills: 0 | Status: COMPLETED | OUTPATIENT
Start: 2024-02-19 | End: 2024-02-19

## 2024-02-19 RX ORDER — ONDANSETRON 8 MG/1
1 TABLET, FILM COATED ORAL
Refills: 0 | DISCHARGE

## 2024-02-19 RX ORDER — CLONAZEPAM 1 MG
0 TABLET ORAL
Qty: 0 | Refills: 0 | DISCHARGE

## 2024-02-19 RX ORDER — PANTOPRAZOLE SODIUM 20 MG/1
40 TABLET, DELAYED RELEASE ORAL ONCE
Refills: 0 | Status: COMPLETED | OUTPATIENT
Start: 2024-02-19 | End: 2024-02-19

## 2024-02-19 RX ORDER — SODIUM CHLORIDE 9 MG/ML
2000 INJECTION, SOLUTION INTRAVENOUS ONCE
Refills: 0 | Status: COMPLETED | OUTPATIENT
Start: 2024-02-19 | End: 2024-02-19

## 2024-02-19 RX ORDER — HALOPERIDOL DECANOATE 100 MG/ML
5 INJECTION INTRAMUSCULAR ONCE
Refills: 0 | Status: COMPLETED | OUTPATIENT
Start: 2024-02-19 | End: 2024-02-19

## 2024-02-19 RX ORDER — SODIUM CHLORIDE 9 MG/ML
1000 INJECTION INTRAMUSCULAR; INTRAVENOUS; SUBCUTANEOUS ONCE
Refills: 0 | Status: COMPLETED | OUTPATIENT
Start: 2024-02-19 | End: 2024-02-19

## 2024-02-19 RX ORDER — LANOLIN ALCOHOL/MO/W.PET/CERES
3 CREAM (GRAM) TOPICAL AT BEDTIME
Refills: 0 | Status: DISCONTINUED | OUTPATIENT
Start: 2024-02-19 | End: 2024-02-20

## 2024-02-19 RX ADMIN — ONDANSETRON 4 MILLIGRAM(S): 8 TABLET, FILM COATED ORAL at 11:13

## 2024-02-19 RX ADMIN — Medication 30 MILLILITER(S): at 19:53

## 2024-02-19 RX ADMIN — Medication 1 MILLIGRAM(S): at 08:03

## 2024-02-19 RX ADMIN — FAMOTIDINE 20 MILLIGRAM(S): 10 INJECTION INTRAVENOUS at 19:06

## 2024-02-19 RX ADMIN — ONDANSETRON 4 MILLIGRAM(S): 8 TABLET, FILM COATED ORAL at 06:56

## 2024-02-19 RX ADMIN — HALOPERIDOL DECANOATE 5 MILLIGRAM(S): 100 INJECTION INTRAMUSCULAR at 08:03

## 2024-02-19 RX ADMIN — Medication 2 MILLIGRAM(S): at 22:36

## 2024-02-19 RX ADMIN — Medication 25 MILLIGRAM(S): at 22:37

## 2024-02-19 RX ADMIN — SODIUM CHLORIDE 1000 MILLILITER(S): 9 INJECTION INTRAMUSCULAR; INTRAVENOUS; SUBCUTANEOUS at 13:50

## 2024-02-19 RX ADMIN — ONDANSETRON 4 MILLIGRAM(S): 8 TABLET, FILM COATED ORAL at 23:01

## 2024-02-19 RX ADMIN — Medication 1 MILLIGRAM(S): at 11:13

## 2024-02-19 RX ADMIN — PANTOPRAZOLE SODIUM 40 MILLIGRAM(S): 20 TABLET, DELAYED RELEASE ORAL at 08:04

## 2024-02-19 RX ADMIN — Medication 10 MILLIGRAM(S): at 14:56

## 2024-02-19 RX ADMIN — SODIUM CHLORIDE 100 MILLILITER(S): 9 INJECTION, SOLUTION INTRAVENOUS at 23:02

## 2024-02-19 RX ADMIN — ONDANSETRON 4 MILLIGRAM(S): 8 TABLET, FILM COATED ORAL at 08:03

## 2024-02-19 RX ADMIN — SODIUM CHLORIDE 2000 MILLILITER(S): 9 INJECTION, SOLUTION INTRAVENOUS at 10:50

## 2024-02-19 RX ADMIN — SODIUM CHLORIDE 2000 MILLILITER(S): 9 INJECTION, SOLUTION INTRAVENOUS at 06:56

## 2024-02-19 RX ADMIN — Medication 0.5 MILLIGRAM(S): at 22:36

## 2024-02-19 NOTE — ED PROVIDER NOTE - CLINICAL SUMMARY MEDICAL DECISION MAKING FREE TEXT BOX
38-year-old female with nausea vomiting, follow-up CBC, CMP, hCG, lipase, IV fluids, antiemetics and reevaluate

## 2024-02-19 NOTE — H&P ADULT - HISTORY OF PRESENT ILLNESS
38m with gastroparesis, GERD, chronic nausea, anxiety presents with vomiting. Pt states about 30 minutes after eating dinner last night she developed severe nausea and vomiting. She also reports episodes of feeling very hot followed by intense nausea. 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

## 2024-02-19 NOTE — ED ADULT NURSE NOTE - OBJECTIVE STATEMENT
Pt BIBEMS c/o vomiting since 2am.  As per pt  and mom, pt has h/o "stomach issues".  Denies any chest pain or SOB.  +nausea and vomiting.  No diarrhea.  Pt attempted to take po zofran at home but was unsuccessful in keeping it down.  Mom at bedside.  Maintain comfort and safety.

## 2024-02-19 NOTE — PATIENT PROFILE ADULT - IS THERE A SUSPICION OF ABUSE/NEGLIGENCE?
Pt is a 83 y/o Female with PMHx of CHF, DM, HLD, HTN, CAD prior MI, c/o fatigue, "feeling off" and unable to get out of bed; Tmax 104F . found t have UTI no

## 2024-02-19 NOTE — ED ADULT NURSE NOTE - ABDOMEN
E- advice sent to Dr Ricks.     Duplicate message.  
Patient called about the bp medication she recently stopped taking. She sent in an e-advice but has not heard back. She was looking to follow up on what Dr. Ricks would advise.   
soft/nondistended/obese

## 2024-02-19 NOTE — ED ADULT TRIAGE NOTE - CHIEF COMPLAINT QUOTE
per ems from home with vomiting since 2am, states she has a sensitive stomach. pt tried taking zofran at home

## 2024-02-19 NOTE — ED ADULT NURSE NOTE - ED STAT RN HANDOFF DETAILS
pt endorsed to DADA Gee patient is A&Ox4. vital signs within normal limits for patient. patient denies chest pain, or shortness of breath.  medications tolerated well.  safety precautions maintained.  will continue to assess and monitor for safety until transport arrives.

## 2024-02-19 NOTE — PATIENT PROFILE ADULT - TRANSPORTATION
c/o frequent dry cough and CP with deep inspiration. Ran out of asthma inhaler a few weeks ago as well
no

## 2024-02-19 NOTE — ED PROVIDER NOTE - OBJECTIVE STATEMENT
38-year-old female PMH of gastroparesis, cholecystectomy, presents to the emergency department ambulance with mom at the bedside complaining of nausea vomiting, started at 2:00 in the morning, patient states she does not think she ate anything that could have caused this, states this has happened to her before.

## 2024-02-20 ENCOUNTER — TRANSCRIPTION ENCOUNTER (OUTPATIENT)
Age: 39
End: 2024-02-20

## 2024-02-20 VITALS
HEART RATE: 70 BPM | RESPIRATION RATE: 18 BRPM | TEMPERATURE: 98 F | OXYGEN SATURATION: 95 % | SYSTOLIC BLOOD PRESSURE: 126 MMHG | DIASTOLIC BLOOD PRESSURE: 88 MMHG

## 2024-02-20 LAB
ALBUMIN SERPL ELPH-MCNC: 3.2 G/DL — LOW (ref 3.3–5)
ALP SERPL-CCNC: 55 U/L — SIGNIFICANT CHANGE UP (ref 40–120)
ALT FLD-CCNC: 44 U/L — SIGNIFICANT CHANGE UP (ref 12–78)
ANION GAP SERPL CALC-SCNC: 8 MMOL/L — SIGNIFICANT CHANGE UP (ref 5–17)
AST SERPL-CCNC: 27 U/L — SIGNIFICANT CHANGE UP (ref 15–37)
BASOPHILS # BLD AUTO: 0.03 K/UL — SIGNIFICANT CHANGE UP (ref 0–0.2)
BASOPHILS NFR BLD AUTO: 0.4 % — SIGNIFICANT CHANGE UP (ref 0–2)
BILIRUB SERPL-MCNC: 0.3 MG/DL — SIGNIFICANT CHANGE UP (ref 0.2–1.2)
BUN SERPL-MCNC: 9 MG/DL — SIGNIFICANT CHANGE UP (ref 7–23)
CALCIUM SERPL-MCNC: 8.3 MG/DL — LOW (ref 8.5–10.1)
CHLORIDE SERPL-SCNC: 107 MMOL/L — SIGNIFICANT CHANGE UP (ref 96–108)
CO2 SERPL-SCNC: 25 MMOL/L — SIGNIFICANT CHANGE UP (ref 22–31)
CREAT SERPL-MCNC: 0.81 MG/DL — SIGNIFICANT CHANGE UP (ref 0.5–1.3)
EGFR: 94 ML/MIN/1.73M2 — SIGNIFICANT CHANGE UP
EOSINOPHIL # BLD AUTO: 0.02 K/UL — SIGNIFICANT CHANGE UP (ref 0–0.5)
EOSINOPHIL NFR BLD AUTO: 0.3 % — SIGNIFICANT CHANGE UP (ref 0–6)
GLUCOSE SERPL-MCNC: 97 MG/DL — SIGNIFICANT CHANGE UP (ref 70–99)
HCT VFR BLD CALC: 34.6 % — SIGNIFICANT CHANGE UP (ref 34.5–45)
HGB BLD-MCNC: 11.2 G/DL — LOW (ref 11.5–15.5)
IMM GRANULOCYTES NFR BLD AUTO: 0.4 % — SIGNIFICANT CHANGE UP (ref 0–0.9)
LYMPHOCYTES # BLD AUTO: 1.1 K/UL — SIGNIFICANT CHANGE UP (ref 1–3.3)
LYMPHOCYTES # BLD AUTO: 15.4 % — SIGNIFICANT CHANGE UP (ref 13–44)
MCHC RBC-ENTMCNC: 27.6 PG — SIGNIFICANT CHANGE UP (ref 27–34)
MCHC RBC-ENTMCNC: 32.4 GM/DL — SIGNIFICANT CHANGE UP (ref 32–36)
MCV RBC AUTO: 85.2 FL — SIGNIFICANT CHANGE UP (ref 80–100)
MONOCYTES # BLD AUTO: 0.8 K/UL — SIGNIFICANT CHANGE UP (ref 0–0.9)
MONOCYTES NFR BLD AUTO: 11.2 % — SIGNIFICANT CHANGE UP (ref 2–14)
NEUTROPHILS # BLD AUTO: 5.14 K/UL — SIGNIFICANT CHANGE UP (ref 1.8–7.4)
NEUTROPHILS NFR BLD AUTO: 72.3 % — SIGNIFICANT CHANGE UP (ref 43–77)
NRBC # BLD: 0 /100 WBCS — SIGNIFICANT CHANGE UP (ref 0–0)
PLATELET # BLD AUTO: 296 K/UL — SIGNIFICANT CHANGE UP (ref 150–400)
POTASSIUM SERPL-MCNC: 3.3 MMOL/L — LOW (ref 3.5–5.3)
POTASSIUM SERPL-SCNC: 3.3 MMOL/L — LOW (ref 3.5–5.3)
PROT SERPL-MCNC: 6.1 G/DL — SIGNIFICANT CHANGE UP (ref 6–8.3)
RBC # BLD: 4.06 M/UL — SIGNIFICANT CHANGE UP (ref 3.8–5.2)
RBC # FLD: 13.6 % — SIGNIFICANT CHANGE UP (ref 10.3–14.5)
SODIUM SERPL-SCNC: 140 MMOL/L — SIGNIFICANT CHANGE UP (ref 135–145)
WBC # BLD: 7.12 K/UL — SIGNIFICANT CHANGE UP (ref 3.8–10.5)
WBC # FLD AUTO: 7.12 K/UL — SIGNIFICANT CHANGE UP (ref 3.8–10.5)

## 2024-02-20 PROCEDURE — 96375 TX/PRO/DX INJ NEW DRUG ADDON: CPT

## 2024-02-20 PROCEDURE — 84702 CHORIONIC GONADOTROPIN TEST: CPT

## 2024-02-20 PROCEDURE — 99285 EMERGENCY DEPT VISIT HI MDM: CPT | Mod: 25

## 2024-02-20 PROCEDURE — 85025 COMPLETE CBC W/AUTO DIFF WBC: CPT

## 2024-02-20 PROCEDURE — 96374 THER/PROPH/DIAG INJ IV PUSH: CPT

## 2024-02-20 PROCEDURE — 80053 COMPREHEN METABOLIC PANEL: CPT

## 2024-02-20 PROCEDURE — 83690 ASSAY OF LIPASE: CPT

## 2024-02-20 PROCEDURE — 74177 CT ABD & PELVIS W/CONTRAST: CPT | Mod: MA

## 2024-02-20 PROCEDURE — 0225U NFCT DS DNA&RNA 21 SARSCOV2: CPT

## 2024-02-20 PROCEDURE — 96372 THER/PROPH/DIAG INJ SC/IM: CPT | Mod: XU

## 2024-02-20 PROCEDURE — 36415 COLL VENOUS BLD VENIPUNCTURE: CPT

## 2024-02-20 PROCEDURE — 96376 TX/PRO/DX INJ SAME DRUG ADON: CPT

## 2024-02-20 RX ORDER — LOPERAMIDE HCL 2 MG
2 TABLET ORAL ONCE
Refills: 0 | Status: COMPLETED | OUTPATIENT
Start: 2024-02-20 | End: 2024-02-20

## 2024-02-20 RX ADMIN — Medication 25 MILLIGRAM(S): at 14:05

## 2024-02-20 RX ADMIN — Medication 25 MILLIGRAM(S): at 06:17

## 2024-02-20 RX ADMIN — PANTOPRAZOLE SODIUM 40 MILLIGRAM(S): 20 TABLET, DELAYED RELEASE ORAL at 06:17

## 2024-02-20 RX ADMIN — Medication 50 MILLIGRAM(S): at 11:10

## 2024-02-20 RX ADMIN — Medication 2 MILLIGRAM(S): at 08:16

## 2024-02-20 RX ADMIN — ESCITALOPRAM OXALATE 20 MILLIGRAM(S): 10 TABLET, FILM COATED ORAL at 11:10

## 2024-02-20 RX ADMIN — Medication 650 MILLIGRAM(S): at 14:05

## 2024-02-20 RX ADMIN — Medication 650 MILLIGRAM(S): at 14:35

## 2024-02-20 NOTE — DISCHARGE NOTE PROVIDER - HOSPITAL COURSE
38m with gastroparesis, GERD, chronic nausea, anxiety presents with vomiting. Pt states about 30 minutes after eating dinner last night she developed severe nausea and vomiting. She also reports episodes of feeling very hot followed by intense nausea. 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 Pt was admitted and started on IV fluids and antiemetics. She developed diarrhea and was given imodium with improvement. She was tolerating a full liquid diet and was requesting DC home.   LABS:                        11.2   7.12  )-----------( 296      ( 20 Feb 2024 06:05 )             34.6     02-20    140  |  107  |  9   ----------------------------<  97  3.3<L>   |  25  |  0.81    Ca    8.3<L>      20 Feb 2024 06:05    TPro  6.1  /  Alb  3.2<L>  /  TBili  0.3  /  DBili  x   /  AST  27  /  ALT  44  /  AlkPhos  55  02-20        RADIOLOGY & ADDITIONAL TESTS:  < from: CT Abdomen and Pelvis w/ IV Cont (02.19.24 @ 12:09) >    LOWER CHEST: Clear lung bases.    LIVER: Steatosis.  BILE DUCTS: Normal caliber.  GALLBLADDER: Cholecystectomy.  SPLEEN: Within normal limits.  PANCREAS: Within normal limits.  ADRENALS: 1.2 cm indeterminate right adrenal nodule.  KIDNEYS/URETERS: Punctate nonobstructive right renal calculus. No   hydronephrosis.    BLADDER: Within normal limits.  REPRODUCTIVE ORGANS: Fibroid uterus.    BOWEL: No bowel obstruction. Appendix is within normal limits.  PERITONEUM: No ascites.  VESSELS: Within normal limits.  RETROPERITONEUM/LYMPH NODES: No lymphadenopathy.  ABDOMINAL WALL: Tiny fat-containing umbilical hernia.  BONES: Within normal limits. Stable right iliac sclerotic focus.    IMPRESSION:  Hepatic steatosis.  No bowel obstruction.    < end of copied text >

## 2024-02-20 NOTE — CARE COORDINATION ASSESSMENT. - NSPASTMEDSURGHISTORY_GEN_ALL_CORE_FT
PAST MEDICAL & SURGICAL HISTORY:  Anxiety      Cholelithiasis      S/P sinus surgery      S/P rhinoplasty      History of bilateral breast reduction surgery      IBS (irritable bowel syndrome)      S/P cholecystectomy      Kidney stone

## 2024-02-20 NOTE — DISCHARGE NOTE PROVIDER - CARE PROVIDER_API CALL
Mica Salvador  Family Medicine  [address] [place] [id number]  Phone: [phone]  Fax: [phone]  Follow Up Time:

## 2024-02-20 NOTE — DISCHARGE NOTE PROVIDER - NSDCCPCAREPLAN_GEN_ALL_CORE_FT
PRINCIPAL DISCHARGE DIAGNOSIS  Diagnosis: Gastroenteritis  Assessment and Plan of Treatment:       SECONDARY DISCHARGE DIAGNOSES  Diagnosis: Gastroparesis  Assessment and Plan of Treatment:     Diagnosis: GERD (gastroesophageal reflux disease)  Assessment and Plan of Treatment:

## 2024-02-20 NOTE — CARE COORDINATION ASSESSMENT. - OTHER PERTINENT REFERRAL INFORMATION
Met with the patient at the bedside. Explained the role of case management/discharge planning. Patient verbalized understanding. Provided contact information and discharge planning packet. Patient resides alone and was independent PTA. Patient admitted with Nausea Vomiting and Diarrhea. No anticipated skilled needs noted at the present time. Will remain available to patient throughout hospital stay

## 2024-02-20 NOTE — CARE COORDINATION ASSESSMENT. - NSCAREPROVIDERS_GEN_ALL_CORE_FT
CARE PROVIDERS:  Accepting Physician: Mukund Mcneil  Administration: Heriberto Junior  Admitting: Mukund Mcneil  Attending: Mukund Mcneil  Case Management: Kath Quiñones  ED Attending: Mukesh Burden  ED Nurse: Aditi Alexander  HIM/Billing & Coding: Rani Rangel  Nurse: Jerrod Daly  Nurse: Cruz Hadley  Nurse: Cira Valentino  Nurse: Aditi Alexander  Nurse: Lorri Hyman  Nurse: Marlen Lundberg  Ordered: ADM, User  Outpatient Provider: Mukund Mcneil  Override: Marlen Lundberg  Override: Jerrod Daly  Override: Ray Daniels  Primary Team: Kenton Anthony// Supp. Assoc.: María Montejo   CARE PROVIDERS:  Accepting Physician: Mukund Mcneil  Administration: Heriberto Junior  Admitting: Mukund Mcneil  Attending: Mukund Mcneil  Case Management: Kath Quiñones  ED Attending: Mukesh Burden  ED Nurse: Aditi Alexander  HIM/Billing & Coding: Rani Rangel  Nurse: Jerrod Daly  Nurse: Lorri Hyman  Nurse: Marlen Lundberg  Ordered: ADM, User  Outpatient Provider: Mukund Mcneil  Override: Marlen Lundberg  Override: Ray Daniels  Override: Jerrod Daly  Primary Team: Kenton Anthony// Supp. Assoc.: María Montejo

## 2024-02-20 NOTE — DISCHARGE NOTE NURSING/CASE MANAGEMENT/SOCIAL WORK - NSDCPEFALRISK_GEN_ALL_CORE
For information on Fall & Injury Prevention, visit: [email or web address] OR  [email or web address] OR  https://www.cdc.gov/steadi/patient.html

## 2024-02-20 NOTE — DISCHARGE NOTE PROVIDER - NSDCMRMEDTOKEN_GEN_ALL_CORE_FT
imipramine 50 mg oral tablet: 1 tab(s) orally once a day  KlonoPIN 0.5 mg oral tablet: orally 3 times a day, As Needed  Lexapro 20 mg oral tablet: 1 tab(s) orally once a day  omeprazole 20 mg oral delayed release capsule: 1 cap(s) orally once a day  phenobarbital/hyoscyamine/atropine/scopolamine 16.2 mg-0.1037 mg-0.0194 mg-0.0065 mg oral tablet: 1 tab(s) orally every 8 hours, As needed, discomfort IBS  promethazine 25 mg oral tablet: 1 tab(s) orally 3 times a day as needed for  nausea  Zofran 4 mg oral tablet: 1 tab(s) orally 4 times a day as needed for  nausea

## 2024-02-20 NOTE — DISCHARGE NOTE NURSING/CASE MANAGEMENT/SOCIAL WORK - PATIENT PORTAL LINK FT
You can access the FollowMyHealth Patient Portal offered by [facility] by registering at the following website: [email or web address]. By joining [email or web address]’s FollowMyHealth portal, you will also be able to view your health information using other applications (apps) compatible with our system. No

## 2024-02-20 NOTE — DISCHARGE NOTE PROVIDER - DISCHARGE DATE
20-Feb-2024

## 2024-06-06 NOTE — ED ADULT NURSE NOTE - NSSISCREENINGQ3_ED_A_ED
Reason for Disposition  • Caller has already spoken with another triager and has no further questions    Answer Assessment - Initial Assessment Questions  See results    Protocols used: No Contact or Duplicate Contact Call-ADULT-[place]     No

## 2024-11-04 NOTE — PROGRESS NOTE ADULT - SUBJECTIVE AND OBJECTIVE BOX
Overnight events:   Patient seen and examined states that she is feeling better and was able to eat breakfast   Patient currently being discharged     adhesives (Rash)  No Known Drug Allergies      aluminum hydroxide/magnesium hydroxide/simethicone Suspension 30 milliLiter(s) Oral every 4 hours PRN  amoxicillin 500 milliGRAM(s) Oral three times a day  clonazePAM  Tablet 1 milliGRAM(s) Oral daily PRN  Donnatal 1 Tablet(s) Oral every 8 hours PRN  escitalopram 20 milliGRAM(s) Oral daily  famotidine Injectable 20 milliGRAM(s) IV Push two times a day  HYDROmorphone  Injectable 1 milliGRAM(s) IV Push every 2 hours PRN  ketorolac   Injectable 15 milliGRAM(s) IV Push every 6 hours PRN  ketorolac   Injectable 30 milliGRAM(s) IV Push every 6 hours PRN  lactobacillus acidophilus 1 Tablet(s) Oral three times a day with meals  melatonin 5 milliGRAM(s) Oral at bedtime  ondansetron Injectable 4 milliGRAM(s) IV Push every 6 hours PRN  oxyCODONE    IR 5 milliGRAM(s) Oral every 4 hours PRN  phenazopyridine 200 milliGRAM(s) Oral three times a day  prochlorperazine   IVPB 10 milliGRAM(s) IV Intermittent once PRN        FAMILY HISTORY:  No pertinent family history in first degree relatives        Review of Systems:    General:  No wt loss, fevers, chills, night sweats,fatigue,   Eyes:  Good vision, no reported pain  ENT:  No sore throat, pain, runny nose, dysphagia  CV:  No pain, palpitatioins, hypo/hypertension  Resp:  No dyspnea, cough, tachypnea, wheezing  :  No pain, bleeding, incontinence, nocturia  Muscle:  No pain, weakness  Neuro:  No weakness, tingling, memory problems  Psych:  No fatigue, insomnia, mood problems, depression  Endocrine:  No polyuria, polydypsia, cold/heat intolerance  Heme:  No petechiae, ecchymosis, easy bruisability  Skin:  No rash, tattoos, scars, edema    Relevant Family History:       Relevant Social History:       Physical Exam:    Vital Signs Last 24 Hrs  T(C): 36.9 (01 Jun 2019 07:59), Max: 37.1 (31 May 2019 16:11)  T(F): 98.5 (01 Jun 2019 07:59), Max: 98.8 (31 May 2019 20:12)  HR: 92 (01 Jun 2019 07:59) (86 - 112)  BP: 112/74 (01 Jun 2019 07:59) (109/75 - 126/85)  BP(mean): --  RR: 18 (01 Jun 2019 07:59) (17 - 18)  SpO2: 95% (01 Jun 2019 07:59) (95% - 96%)     General:  Appears stated age, well-groomed, well-nourished, no distress  HEENT:  NC/AT,  conjunctivae clear and pink, no thyromegaly, nodules, adenopathy, no JVD  Chest:  Full & symmetric excursion, no increased effort, breath sounds clear  Cardiovascular:  Regular rhythm, S1, S2, no murmur/rub/S3/S4, no abdominal bruit, no edema  Abdomen:  Soft, non-tender, non-distended, normoactive bowel sounds,  no masses ,no hepatosplenomeagaly, no signs of chronic liver disease  Extremities:  no cyanosis,clubbing or edema  Skin:  No rash/erythema/ecchymoses/petechiae/wounds/abscess/warm/dry  Neuro/Psych:  Alert, oriented, no asterixis, no tremor, no encephalopathy    Laboratory:                                                  10.8   6.55  )-----------( 244      ( 31 May 2019 05:50 )             33.6   06-01    139  |  105  |  6<L>  ----------------------------<  64<L>  3.6   |  25  |  0.46<L>    Ca    8.2<L>      01 Jun 2019 07:03        Imaging: [Home] : at home, [unfilled] , at the time of the visit. [Other Location: e.g. Home (Enter Location, City,State)___] : at [unfilled] [Verbal consent obtained from patient] : the patient, [unfilled] [FreeTextEntry8] : 44 y M h/o Asthma on Treligy, OUD on relistor, crohns on Stelara recently moved from [place] needing Rx for Stelara 90mg.  Needs new Rx, already 3 days late.  No recent Crohns flares. No current symptoms.  Unable to see GI until December.  No prior side effects from Stelara.   States was seen by [facility] primary care but no records in [facility].  Prior MRN [id number] Last pharmacy at [place] while inpatient NKDA No recent URI symptoms, no recent fevers, no abd pain or diarrhea.  No vomiting

## 2024-11-20 NOTE — ED ADULT NURSE NOTE - CINV DISCH MEDS REVIEWED YN
and some light sensitivity.     Physical Exam:   No bruising or erythema to right cheek. Respirations even and unlabored      POC Labs:     No visits with results within 2 Day(s) from this visit.   Latest known visit with results is:   No results found for any previous visit.        No diagnosis found.     Medical Decision Making & Plan:     Facial injury/ trauma  -right cheek slightly tender, no bruising. Skin intact. Likely experiencing some mild concussive syndrome since she was hit in head. Supportive care, tylenol for headache. Return to clinic if no improvement or worsening of symptoms after 7-10 days        11/20/24 at 10:43 AM - DIANA Nelson-CNP    
Rx/Yes

## 2025-01-01 ENCOUNTER — EMERGENCY (EMERGENCY)
Facility: HOSPITAL | Age: 40
LOS: 1 days | Discharge: HOME | End: 2025-01-01
Attending: EMERGENCY MEDICINE | Admitting: EMERGENCY MEDICINE
Payer: COMMERCIAL

## 2025-01-01 VITALS
HEIGHT: 63 IN | OXYGEN SATURATION: 99 % | TEMPERATURE: 98 F | HEART RATE: 120 BPM | RESPIRATION RATE: 18 BRPM | SYSTOLIC BLOOD PRESSURE: 116 MMHG | DIASTOLIC BLOOD PRESSURE: 80 MMHG | WEIGHT: 225.09 LBS

## 2025-01-01 DIAGNOSIS — Z90.13 ACQUIRED ABSENCE OF BILATERAL BREASTS AND NIPPLES: Chronic | ICD-10-CM

## 2025-01-01 DIAGNOSIS — Z90.49 ACQUIRED ABSENCE OF OTHER SPECIFIED PARTS OF DIGESTIVE TRACT: Chronic | ICD-10-CM

## 2025-01-01 LAB
ALBUMIN SERPL ELPH-MCNC: 3.7 G/DL — SIGNIFICANT CHANGE UP (ref 3.3–5)
ALP SERPL-CCNC: 72 U/L — SIGNIFICANT CHANGE UP (ref 40–120)
ALT FLD-CCNC: 50 U/L — SIGNIFICANT CHANGE UP (ref 12–78)
ANION GAP SERPL CALC-SCNC: 7 MMOL/L — SIGNIFICANT CHANGE UP (ref 5–17)
APPEARANCE UR: CLEAR — SIGNIFICANT CHANGE UP
AST SERPL-CCNC: 42 U/L — HIGH (ref 15–37)
BASOPHILS # BLD AUTO: 0.07 K/UL — SIGNIFICANT CHANGE UP (ref 0–0.2)
BASOPHILS NFR BLD AUTO: 0.6 % — SIGNIFICANT CHANGE UP (ref 0–2)
BILIRUB SERPL-MCNC: 0.3 MG/DL — SIGNIFICANT CHANGE UP (ref 0.2–1.2)
BILIRUB UR-MCNC: NEGATIVE — SIGNIFICANT CHANGE UP
BUN SERPL-MCNC: 9 MG/DL — SIGNIFICANT CHANGE UP (ref 7–23)
CALCIUM SERPL-MCNC: 9 MG/DL — SIGNIFICANT CHANGE UP (ref 8.5–10.1)
CHLORIDE SERPL-SCNC: 105 MMOL/L — SIGNIFICANT CHANGE UP (ref 96–108)
CO2 SERPL-SCNC: 24 MMOL/L — SIGNIFICANT CHANGE UP (ref 22–31)
COLOR SPEC: YELLOW — SIGNIFICANT CHANGE UP
CREAT SERPL-MCNC: 0.8 MG/DL — SIGNIFICANT CHANGE UP (ref 0.5–1.3)
DIFF PNL FLD: NEGATIVE — SIGNIFICANT CHANGE UP
EGFR: 95 ML/MIN/1.73M2 — SIGNIFICANT CHANGE UP
EGFR: 95 ML/MIN/1.73M2 — SIGNIFICANT CHANGE UP
EOSINOPHIL # BLD AUTO: 0.09 K/UL — SIGNIFICANT CHANGE UP (ref 0–0.5)
EOSINOPHIL NFR BLD AUTO: 0.8 % — SIGNIFICANT CHANGE UP (ref 0–6)
GLUCOSE SERPL-MCNC: 86 MG/DL — SIGNIFICANT CHANGE UP (ref 70–99)
GLUCOSE UR QL: NEGATIVE MG/DL — SIGNIFICANT CHANGE UP
HCG SERPL-ACNC: <1 MIU/ML — SIGNIFICANT CHANGE UP
HCT VFR BLD CALC: 39.1 % — SIGNIFICANT CHANGE UP (ref 34.5–45)
HGB BLD-MCNC: 12.9 G/DL — SIGNIFICANT CHANGE UP (ref 11.5–15.5)
IMM GRANULOCYTES NFR BLD AUTO: 0.3 % — SIGNIFICANT CHANGE UP (ref 0–0.9)
KETONES UR-MCNC: 40 MG/DL
LEUKOCYTE ESTERASE UR-ACNC: NEGATIVE — SIGNIFICANT CHANGE UP
LIDOCAIN IGE QN: 30 U/L — SIGNIFICANT CHANGE UP (ref 13–75)
LYMPHOCYTES # BLD AUTO: 1.33 K/UL — SIGNIFICANT CHANGE UP (ref 1–3.3)
LYMPHOCYTES # BLD AUTO: 11.5 % — LOW (ref 13–44)
MCHC RBC-ENTMCNC: 27.3 PG — SIGNIFICANT CHANGE UP (ref 27–34)
MCHC RBC-ENTMCNC: 33 G/DL — SIGNIFICANT CHANGE UP (ref 32–36)
MCV RBC AUTO: 82.8 FL — SIGNIFICANT CHANGE UP (ref 80–100)
MONOCYTES # BLD AUTO: 0.6 K/UL — SIGNIFICANT CHANGE UP (ref 0–0.9)
MONOCYTES NFR BLD AUTO: 5.2 % — SIGNIFICANT CHANGE UP (ref 2–14)
NEUTROPHILS # BLD AUTO: 9.47 K/UL — HIGH (ref 1.8–7.4)
NEUTROPHILS NFR BLD AUTO: 81.6 % — HIGH (ref 43–77)
NITRITE UR-MCNC: NEGATIVE — SIGNIFICANT CHANGE UP
NRBC # BLD: 0 /100 WBCS — SIGNIFICANT CHANGE UP (ref 0–0)
NRBC BLD-RTO: 0 /100 WBCS — SIGNIFICANT CHANGE UP (ref 0–0)
PH UR: 5.5 — SIGNIFICANT CHANGE UP (ref 5–8)
PLATELET # BLD AUTO: 451 K/UL — HIGH (ref 150–400)
POTASSIUM SERPL-MCNC: 3.5 MMOL/L — SIGNIFICANT CHANGE UP (ref 3.5–5.3)
POTASSIUM SERPL-SCNC: 3.5 MMOL/L — SIGNIFICANT CHANGE UP (ref 3.5–5.3)
PROT SERPL-MCNC: 7.5 G/DL — SIGNIFICANT CHANGE UP (ref 6–8.3)
PROT UR-MCNC: NEGATIVE MG/DL — SIGNIFICANT CHANGE UP
RBC # BLD: 4.72 M/UL — SIGNIFICANT CHANGE UP (ref 3.8–5.2)
RBC # FLD: 13.4 % — SIGNIFICANT CHANGE UP (ref 10.3–14.5)
SODIUM SERPL-SCNC: 136 MMOL/L — SIGNIFICANT CHANGE UP (ref 135–145)
SP GR SPEC: 1.02 — SIGNIFICANT CHANGE UP (ref 1–1.03)
UROBILINOGEN FLD QL: 1 MG/DL — SIGNIFICANT CHANGE UP (ref 0.2–1)
WBC # BLD: 11.6 K/UL — HIGH (ref 3.8–10.5)
WBC # FLD AUTO: 11.6 K/UL — HIGH (ref 3.8–10.5)

## 2025-01-01 RX ORDER — LORAZEPAM 4 MG/ML
1 VIAL (ML) INJECTION ONCE
Refills: 0 | Status: DISCONTINUED | OUTPATIENT
Start: 2025-01-01 | End: 2025-01-01

## 2025-01-01 RX ORDER — ONDANSETRON HCL/PF 4 MG/2 ML
4 VIAL (ML) INJECTION ONCE
Refills: 0 | Status: COMPLETED | OUTPATIENT
Start: 2025-01-01 | End: 2025-01-01

## 2025-01-01 RX ORDER — ACETAMINOPHEN 500 MG/5ML
1000 LIQUID (ML) ORAL ONCE
Refills: 0 | Status: COMPLETED | OUTPATIENT
Start: 2025-01-01 | End: 2025-01-01

## 2025-01-01 RX ADMIN — Medication 20 MILLIGRAM(S): at 23:31

## 2025-01-01 RX ADMIN — Medication 1000 MILLILITER(S): at 20:27

## 2025-01-01 RX ADMIN — Medication 400 MILLIGRAM(S): at 20:27

## 2025-01-01 RX ADMIN — Medication 1000 MILLILITER(S): at 23:35

## 2025-01-01 RX ADMIN — Medication 1000 MILLIGRAM(S): at 20:42

## 2025-01-01 RX ADMIN — Medication 1 MILLIGRAM(S): at 21:44

## 2025-01-01 RX ADMIN — Medication 1000 MILLIGRAM(S): at 20:57

## 2025-01-01 RX ADMIN — Medication 4 MILLIGRAM(S): at 20:27

## 2025-01-02 VITALS
TEMPERATURE: 98 F | RESPIRATION RATE: 19 BRPM | SYSTOLIC BLOOD PRESSURE: 119 MMHG | OXYGEN SATURATION: 100 % | DIASTOLIC BLOOD PRESSURE: 84 MMHG | HEART RATE: 96 BPM

## 2025-01-02 RX ORDER — SUCRALFATE 1 G
10 TABLET ORAL
Qty: 200 | Refills: 0
Start: 2025-01-02 | End: 2025-01-06

## 2025-01-02 RX ADMIN — Medication 4 MILLIGRAM(S): at 00:10

## 2025-04-28 NOTE — H&P ADULT - ASSESSMENT
[phone]
32 yo F with above PMH presents with pain and nausea. Patient had CT showing Mild to moderate left-sided hydroureteronephrosis secondary to a 4 mm obstructing calculus mid ureter.  -Admit to medicine  -pain control with morphine  -Resume home medications  -Dr Mahajan urology recs appreciated  -Continue Tamsolusin, continue IV Ceftriaxone  -NPO for now except meds and ice chips  -DVT ppx: Lovenox

## 2025-05-07 ENCOUNTER — NON-APPOINTMENT (OUTPATIENT)
Age: 40
End: 2025-05-07

## 2025-06-23 ENCOUNTER — APPOINTMENT (OUTPATIENT)
Dept: PULMONOLOGY | Facility: CLINIC | Age: 40
End: 2025-06-23
Payer: COMMERCIAL

## 2025-06-23 VITALS
HEART RATE: 104 BPM | HEIGHT: 63 IN | OXYGEN SATURATION: 98 % | WEIGHT: 225 LBS | RESPIRATION RATE: 16 BRPM | DIASTOLIC BLOOD PRESSURE: 80 MMHG | TEMPERATURE: 97.88 F | BODY MASS INDEX: 39.87 KG/M2 | SYSTOLIC BLOOD PRESSURE: 129 MMHG

## 2025-06-23 PROBLEM — T18.2XXA GASTRIC BEZOAR: Status: RESOLVED | Noted: 2018-06-26 | Resolved: 2025-06-23

## 2025-06-23 PROBLEM — R74.8 ELEVATED LIVER ENZYMES: Status: RESOLVED | Noted: 2018-07-13 | Resolved: 2025-06-23

## 2025-06-23 PROBLEM — R06.83 SNORING: Status: ACTIVE | Noted: 2025-06-23

## 2025-06-23 PROBLEM — K31.84 GASTROPARESIS: Status: RESOLVED | Noted: 2025-06-23 | Resolved: 2025-06-23

## 2025-06-23 PROBLEM — U07.1 COVID-19: Status: RESOLVED | Noted: 2025-06-23 | Resolved: 2025-06-23

## 2025-06-23 PROBLEM — Z87.09 HISTORY OF SINUSITIS: Status: RESOLVED | Noted: 2025-06-23 | Resolved: 2025-06-23

## 2025-06-23 PROBLEM — Z82.49 FAMILY HISTORY OF HYPERTENSION: Status: ACTIVE | Noted: 2025-06-23

## 2025-06-23 PROBLEM — J30.89 ENVIRONMENTAL AND SEASONAL ALLERGIES: Status: ACTIVE | Noted: 2025-06-23

## 2025-06-23 PROBLEM — E66.3 OVER WEIGHT: Status: ACTIVE | Noted: 2025-06-23

## 2025-06-23 PROBLEM — Z78.9 SOCIAL ALCOHOL USE: Status: ACTIVE | Noted: 2025-06-23

## 2025-06-23 PROBLEM — Z87.19 HISTORY OF GALLBLADDER DISEASE: Status: RESOLVED | Noted: 2025-06-23 | Resolved: 2025-06-23

## 2025-06-23 PROBLEM — N20.0 CALCIUM KIDNEY STONE: Status: RESOLVED | Noted: 2025-06-23 | Resolved: 2025-06-23

## 2025-06-23 PROBLEM — Z98.890 STATUS POST BREAST REDUCTION: Status: RESOLVED | Noted: 2025-06-23 | Resolved: 2025-06-23

## 2025-06-23 PROBLEM — Z87.898 HISTORY OF ABNORMAL WEIGHT LOSS: Status: RESOLVED | Noted: 2018-06-12 | Resolved: 2025-06-23

## 2025-06-23 PROBLEM — D64.9 ANEMIA: Status: ACTIVE | Noted: 2025-06-23

## 2025-06-23 PROBLEM — K08.409 WISDOM TEETH REMOVED: Status: RESOLVED | Noted: 2025-06-23 | Resolved: 2025-06-23

## 2025-06-23 PROBLEM — R06.02 SOB (SHORTNESS OF BREATH): Status: ACTIVE | Noted: 2025-06-23

## 2025-06-23 PROBLEM — Z87.898 HISTORY OF ABDOMINAL PAIN: Status: RESOLVED | Noted: 2018-06-12 | Resolved: 2025-06-23

## 2025-06-23 PROBLEM — Z87.19 HISTORY OF ANAL FISSURES: Status: RESOLVED | Noted: 2022-04-20 | Resolved: 2025-06-23

## 2025-06-23 PROCEDURE — 95012 NITRIC OXIDE EXP GAS DETER: CPT

## 2025-06-23 PROCEDURE — 94727 GAS DIL/WSHOT DETER LNG VOL: CPT

## 2025-06-23 PROCEDURE — 94729 DIFFUSING CAPACITY: CPT

## 2025-06-23 PROCEDURE — 99204 OFFICE O/P NEW MOD 45 MIN: CPT | Mod: 25

## 2025-06-23 PROCEDURE — 94060 EVALUATION OF WHEEZING: CPT

## 2025-06-23 PROCEDURE — 71046 X-RAY EXAM CHEST 2 VIEWS: CPT

## 2025-06-23 PROCEDURE — 94618 PULMONARY STRESS TESTING: CPT

## 2025-06-23 PROCEDURE — 94799 UNLISTED PULMONARY SVC/PX: CPT

## 2025-06-23 RX ORDER — CLONAZEPAM 2 MG/1
TABLET ORAL
Refills: 0 | Status: ACTIVE | COMMUNITY

## 2025-06-23 RX ORDER — OLOPATADINE HYDROCHLORIDE AND MOMETASONE FUROATE 25; 665 UG/1; UG/1
665-25 SPRAY, METERED NASAL
Qty: 3 | Refills: 1 | Status: ACTIVE | COMMUNITY
Start: 2025-06-23 | End: 1900-01-01

## 2025-06-23 RX ORDER — MINOCYCLINE HYDROCHLORIDE 100 MG/1
100 CAPSULE ORAL
Refills: 0 | Status: ACTIVE | COMMUNITY

## 2025-06-23 RX ORDER — IMIPRAMINE HYDROCHLORIDE 10 MG/1
10 TABLET ORAL
Refills: 0 | Status: ACTIVE | COMMUNITY

## 2025-07-14 ENCOUNTER — LABORATORY RESULT (OUTPATIENT)
Age: 40
End: 2025-07-14